# Patient Record
Sex: FEMALE | Race: WHITE | HISPANIC OR LATINO | Employment: PART TIME | URBAN - METROPOLITAN AREA
[De-identification: names, ages, dates, MRNs, and addresses within clinical notes are randomized per-mention and may not be internally consistent; named-entity substitution may affect disease eponyms.]

---

## 2019-07-11 ENCOUNTER — TRANSCRIBE ORDERS (OUTPATIENT)
Dept: ADMINISTRATIVE | Facility: HOSPITAL | Age: 34
End: 2019-07-11

## 2019-07-11 DIAGNOSIS — R10.9 STOMACH ACHE: ICD-10-CM

## 2019-07-11 DIAGNOSIS — K85.10 GALLSTONE PANCREATITIS: ICD-10-CM

## 2019-07-11 DIAGNOSIS — K85.80 FAMILIAL PANCREATITIS WITH INCREASED CANCER SYNDROME: ICD-10-CM

## 2019-07-11 DIAGNOSIS — K29.00 ACUTE GASTRITIS WITHOUT HEMORRHAGE, UNSPECIFIED GASTRITIS TYPE: Primary | ICD-10-CM

## 2019-07-11 DIAGNOSIS — Z15.09 FAMILIAL PANCREATITIS WITH INCREASED CANCER SYNDROME: ICD-10-CM

## 2019-07-16 ENCOUNTER — HOSPITAL ENCOUNTER (OUTPATIENT)
Dept: ULTRASOUND IMAGING | Facility: HOSPITAL | Age: 34
Discharge: HOME | End: 2019-07-16
Payer: COMMERCIAL

## 2019-07-16 DIAGNOSIS — R10.9 STOMACH ACHE: ICD-10-CM

## 2019-07-16 DIAGNOSIS — K85.10 GALLSTONE PANCREATITIS: ICD-10-CM

## 2019-07-16 DIAGNOSIS — K85.80 FAMILIAL PANCREATITIS WITH INCREASED CANCER SYNDROME: ICD-10-CM

## 2019-07-16 DIAGNOSIS — Z15.09 FAMILIAL PANCREATITIS WITH INCREASED CANCER SYNDROME: ICD-10-CM

## 2019-07-16 DIAGNOSIS — K29.00 ACUTE GASTRITIS WITHOUT HEMORRHAGE, UNSPECIFIED GASTRITIS TYPE: ICD-10-CM

## 2019-07-16 PROCEDURE — 76700 US EXAM ABDOM COMPLETE: CPT

## 2019-07-23 ENCOUNTER — OFFICE VISIT (OUTPATIENT)
Dept: SURGERY | Facility: CLINIC | Age: 34
End: 2019-07-23

## 2019-07-23 VITALS
BODY MASS INDEX: 26.93 KG/M2 | HEART RATE: 80 BPM | DIASTOLIC BLOOD PRESSURE: 84 MMHG | WEIGHT: 152 LBS | HEIGHT: 63 IN | SYSTOLIC BLOOD PRESSURE: 110 MMHG

## 2019-07-23 DIAGNOSIS — K80.10 CALCULOUS CHOLECYSTITIS: Primary | ICD-10-CM

## 2019-07-23 PROCEDURE — 99204 OFFICE O/P NEW MOD 45 MIN: CPT | Performed by: SPECIALIST

## 2019-07-23 RX ORDER — ACETAMINOPHEN 500 MG
500 TABLET ORAL EVERY 6 HOURS PRN
COMMUNITY
End: 2022-08-04

## 2019-07-23 RX ORDER — OMEPRAZOLE 40 MG/1
40 CAPSULE, DELAYED RELEASE ORAL DAILY
COMMUNITY
End: 2021-01-11

## 2019-07-23 NOTE — PROGRESS NOTES
Patient is a 61-year-old  female originally from [place] who speaks minimal English and who complains of a fairly long history of postprandial epigastric and right upper quadrant abdominal pain  Through an  she states that she almost constantly has right upper quadrant abdominal pain  She was seen by family physician who ordered an ultrasound of the gallbladder demonstrated gallstones  At that time she was referred to our office for evaluation possible laparoscopic cholecystectomy  Other than that she is quite healthy  She has had a  3 or 4 years ago but other than that she is in great health  Physical exam:  Young adult  female who is awake alert in no distress  Lungs clear  Cor regular rate and rhythm  Abdomen flat firm she has mild right upper quadrant tenderness noted  There is a well-healed Pfannenstiel incision in the suprapubic area with no evidence of hernia  Impression:  Symptomatic gallstones  Plan:  She will eventually need a laparoscopic cholecystectomy  She and her significant other are not ready to pursue this at this time and and that is fine  At this point she is discharged told return here when they are ready  She is told to go to the emergency room if she develops significant abdominal pain

## 2019-07-25 ENCOUNTER — ANESTHESIA EVENT (INPATIENT)
Dept: PERIOP | Facility: HOSPITAL | Age: 34
End: 2019-07-25
Payer: COMMERCIAL

## 2019-07-25 ENCOUNTER — APPOINTMENT (INPATIENT)
Dept: RADIOLOGY | Facility: HOSPITAL | Age: 34
End: 2019-07-25
Payer: COMMERCIAL

## 2019-07-25 ENCOUNTER — APPOINTMENT (EMERGENCY)
Dept: ULTRASOUND IMAGING | Facility: HOSPITAL | Age: 34
End: 2019-07-25
Payer: COMMERCIAL

## 2019-07-25 ENCOUNTER — HOSPITAL ENCOUNTER (OUTPATIENT)
Facility: HOSPITAL | Age: 34
Setting detail: OUTPATIENT SURGERY
LOS: 1 days | Discharge: HOME | End: 2019-07-25
Attending: EMERGENCY MEDICINE | Admitting: SPECIALIST
Payer: COMMERCIAL

## 2019-07-25 ENCOUNTER — ANESTHESIA (INPATIENT)
Dept: PERIOP | Facility: HOSPITAL | Age: 34
End: 2019-07-25
Payer: COMMERCIAL

## 2019-07-25 VITALS
TEMPERATURE: 98.8 F | HEART RATE: 65 BPM | HEIGHT: 60 IN | BODY MASS INDEX: 29.95 KG/M2 | OXYGEN SATURATION: 100 % | WEIGHT: 152.56 LBS | DIASTOLIC BLOOD PRESSURE: 67 MMHG | RESPIRATION RATE: 16 BRPM | SYSTOLIC BLOOD PRESSURE: 100 MMHG

## 2019-07-25 DIAGNOSIS — K80.00 ACUTE CALCULOUS CHOLECYSTITIS: Primary | ICD-10-CM

## 2019-07-25 DIAGNOSIS — R10.11 RIGHT UPPER QUADRANT PAIN: ICD-10-CM

## 2019-07-25 DIAGNOSIS — K80.20 CHOLELITHIASIS: ICD-10-CM

## 2019-07-25 LAB
ALBUMIN SERPL BCP-MCNC: 4.3 G/DL (ref 3–5.2)
ALP SERPL-CCNC: 55 U/L (ref 43–122)
ALT SERPL W P-5'-P-CCNC: 30 U/L (ref 9–52)
AMORPH PHOS CRY URNS QL MICRO: ABNORMAL /HPF
ANION GAP SERPL CALCULATED.3IONS-SCNC: 7 MMOL/L (ref 5–14)
AST SERPL W P-5'-P-CCNC: 19 U/L (ref 14–36)
ATRIAL RATE: 62 BPM
BACTERIA UR QL AUTO: ABNORMAL /HPF
BASOPHILS # BLD AUTO: 0.1 THOUSANDS/ΜL (ref 0–0.1)
BASOPHILS NFR BLD AUTO: 2 % (ref 0–1)
BILIRUB SERPL-MCNC: 0.4 MG/DL
BILIRUB UR QL STRIP: NEGATIVE
BUN SERPL-MCNC: 14 MG/DL (ref 5–25)
CALCIUM SERPL-MCNC: 9.4 MG/DL (ref 8.4–10.2)
CHLORIDE SERPL-SCNC: 103 MMOL/L (ref 97–108)
CLARITY UR: ABNORMAL
CO2 SERPL-SCNC: 30 MMOL/L (ref 22–30)
COLOR UR: ABNORMAL
CREAT SERPL-MCNC: 0.72 MG/DL (ref 0.6–1.2)
EOSINOPHIL # BLD AUTO: 0.3 THOUSAND/ΜL (ref 0–0.4)
EOSINOPHIL NFR BLD AUTO: 3 % (ref 0–6)
ERYTHROCYTE [DISTWIDTH] IN BLOOD BY AUTOMATED COUNT: 12.1 %
EXT PREG TEST URINE: NEGATIVE
EXT. CONTROL ED NAV: NORMAL
GFR SERPL CREATININE-BSD FRML MDRD: 111 ML/MIN/1.73SQ M
GLUCOSE SERPL-MCNC: 96 MG/DL (ref 70–99)
GLUCOSE UR STRIP-MCNC: NEGATIVE MG/DL
HCT VFR BLD AUTO: 38.7 % (ref 36–46)
HGB BLD-MCNC: 13.3 G/DL (ref 12–16)
HGB UR QL STRIP.AUTO: 250
KETONES UR STRIP-MCNC: NEGATIVE MG/DL
LEUKOCYTE ESTERASE UR QL STRIP: 25
LIPASE SERPL-CCNC: 130 U/L (ref 23–300)
LYMPHOCYTES # BLD AUTO: 3 THOUSANDS/ΜL (ref 0.5–4)
LYMPHOCYTES NFR BLD AUTO: 33 % (ref 25–45)
MCH RBC QN AUTO: 32.2 PG (ref 26–34)
MCHC RBC AUTO-ENTMCNC: 34.3 G/DL (ref 31–36)
MCV RBC AUTO: 94 FL (ref 80–100)
MONOCYTES # BLD AUTO: 0.4 THOUSAND/ΜL (ref 0.2–0.9)
MONOCYTES NFR BLD AUTO: 5 % (ref 1–10)
NEUTROPHILS # BLD AUTO: 5.2 THOUSANDS/ΜL (ref 1.8–7.8)
NEUTS SEG NFR BLD AUTO: 58 % (ref 45–65)
NITRITE UR QL STRIP: NEGATIVE
NON-SQ EPI CELLS URNS QL MICRO: ABNORMAL /HPF
P AXIS: 39 DEGREES
PH UR STRIP.AUTO: 8 [PH]
PLATELET # BLD AUTO: 283 THOUSANDS/UL (ref 150–450)
PMV BLD AUTO: 9 FL (ref 8.9–12.7)
POTASSIUM SERPL-SCNC: 4.2 MMOL/L (ref 3.6–5)
PR INTERVAL: 126 MS
PROT SERPL-MCNC: 7.3 G/DL (ref 5.9–8.4)
PROT UR STRIP-MCNC: NEGATIVE MG/DL
QRS AXIS: 57 DEGREES
QRSD INTERVAL: 84 MS
QT INTERVAL: 416 MS
QTC INTERVAL: 422 MS
RBC # BLD AUTO: 4.12 MILLION/UL (ref 4–5.2)
RBC #/AREA URNS AUTO: ABNORMAL /HPF
SODIUM SERPL-SCNC: 140 MMOL/L (ref 137–147)
SP GR UR STRIP.AUTO: 1.01 (ref 1–1.04)
T WAVE AXIS: 15 DEGREES
UROBILINOGEN UA: NEGATIVE MG/DL
VENTRICULAR RATE: 62 BPM
WBC # BLD AUTO: 9 THOUSAND/UL (ref 4.5–11)
WBC #/AREA URNS AUTO: ABNORMAL /HPF

## 2019-07-25 PROCEDURE — 99285 EMERGENCY DEPT VISIT HI MDM: CPT

## 2019-07-25 PROCEDURE — 93005 ELECTROCARDIOGRAM TRACING: CPT

## 2019-07-25 PROCEDURE — 96361 HYDRATE IV INFUSION ADD-ON: CPT

## 2019-07-25 PROCEDURE — 76705 ECHO EXAM OF ABDOMEN: CPT

## 2019-07-25 PROCEDURE — 99222 1ST HOSP IP/OBS MODERATE 55: CPT | Performed by: SPECIALIST

## 2019-07-25 PROCEDURE — 81025 URINE PREGNANCY TEST: CPT | Performed by: PHYSICIAN ASSISTANT

## 2019-07-25 PROCEDURE — 81001 URINALYSIS AUTO W/SCOPE: CPT | Performed by: PHYSICIAN ASSISTANT

## 2019-07-25 PROCEDURE — 99285 EMERGENCY DEPT VISIT HI MDM: CPT | Performed by: PHYSICIAN ASSISTANT

## 2019-07-25 PROCEDURE — 83690 ASSAY OF LIPASE: CPT | Performed by: PHYSICIAN ASSISTANT

## 2019-07-25 PROCEDURE — 80053 COMPREHEN METABOLIC PANEL: CPT | Performed by: PHYSICIAN ASSISTANT

## 2019-07-25 PROCEDURE — 85025 COMPLETE CBC W/AUTO DIFF WBC: CPT | Performed by: PHYSICIAN ASSISTANT

## 2019-07-25 PROCEDURE — 36415 COLL VENOUS BLD VENIPUNCTURE: CPT | Performed by: PHYSICIAN ASSISTANT

## 2019-07-25 PROCEDURE — 88304 TISSUE EXAM BY PATHOLOGIST: CPT | Performed by: PATHOLOGY

## 2019-07-25 PROCEDURE — 47562 LAPAROSCOPIC CHOLECYSTECTOMY: CPT | Performed by: SPECIALIST

## 2019-07-25 PROCEDURE — 96375 TX/PRO/DX INJ NEW DRUG ADDON: CPT

## 2019-07-25 PROCEDURE — 93010 ELECTROCARDIOGRAM REPORT: CPT | Performed by: INTERNAL MEDICINE

## 2019-07-25 PROCEDURE — 96374 THER/PROPH/DIAG INJ IV PUSH: CPT

## 2019-07-25 PROCEDURE — 81003 URINALYSIS AUTO W/O SCOPE: CPT | Performed by: PHYSICIAN ASSISTANT

## 2019-07-25 RX ORDER — OXYCODONE HYDROCHLORIDE AND ACETAMINOPHEN 5; 325 MG/1; MG/1
1 TABLET ORAL EVERY 4 HOURS PRN
Qty: 20 TABLET | Refills: 0 | Status: SHIPPED | OUTPATIENT
Start: 2019-07-25 | End: 2019-08-04

## 2019-07-25 RX ORDER — OXYCODONE HYDROCHLORIDE AND ACETAMINOPHEN 5; 325 MG/1; MG/1
1 TABLET ORAL EVERY 4 HOURS PRN
Status: DISCONTINUED | OUTPATIENT
Start: 2019-07-25 | End: 2019-07-25 | Stop reason: HOSPADM

## 2019-07-25 RX ORDER — ROCURONIUM BROMIDE 10 MG/ML
INJECTION, SOLUTION INTRAVENOUS AS NEEDED
Status: DISCONTINUED | OUTPATIENT
Start: 2019-07-25 | End: 2019-07-25 | Stop reason: SURG

## 2019-07-25 RX ORDER — KETOROLAC TROMETHAMINE 30 MG/ML
INJECTION, SOLUTION INTRAMUSCULAR; INTRAVENOUS AS NEEDED
Status: DISCONTINUED | OUTPATIENT
Start: 2019-07-25 | End: 2019-07-25 | Stop reason: SURG

## 2019-07-25 RX ORDER — SODIUM CHLORIDE, SODIUM LACTATE, POTASSIUM CHLORIDE, CALCIUM CHLORIDE 600; 310; 30; 20 MG/100ML; MG/100ML; MG/100ML; MG/100ML
125 INJECTION, SOLUTION INTRAVENOUS CONTINUOUS
Status: CANCELLED | OUTPATIENT
Start: 2019-07-25

## 2019-07-25 RX ORDER — METOCLOPRAMIDE HYDROCHLORIDE 5 MG/ML
INJECTION INTRAMUSCULAR; INTRAVENOUS AS NEEDED
Status: DISCONTINUED | OUTPATIENT
Start: 2019-07-25 | End: 2019-07-25 | Stop reason: SURG

## 2019-07-25 RX ORDER — ONDANSETRON 2 MG/ML
4 INJECTION INTRAMUSCULAR; INTRAVENOUS ONCE AS NEEDED
Status: DISCONTINUED | OUTPATIENT
Start: 2019-07-25 | End: 2019-07-25 | Stop reason: HOSPADM

## 2019-07-25 RX ORDER — ONDANSETRON 2 MG/ML
INJECTION INTRAMUSCULAR; INTRAVENOUS AS NEEDED
Status: DISCONTINUED | OUTPATIENT
Start: 2019-07-25 | End: 2019-07-25 | Stop reason: SURG

## 2019-07-25 RX ORDER — FENTANYL CITRATE 50 UG/ML
INJECTION, SOLUTION INTRAMUSCULAR; INTRAVENOUS AS NEEDED
Status: DISCONTINUED | OUTPATIENT
Start: 2019-07-25 | End: 2019-07-25 | Stop reason: SURG

## 2019-07-25 RX ORDER — FENTANYL CITRATE/PF 50 MCG/ML
25 SYRINGE (ML) INJECTION
Status: DISCONTINUED | OUTPATIENT
Start: 2019-07-25 | End: 2019-07-25 | Stop reason: HOSPADM

## 2019-07-25 RX ORDER — ONDANSETRON 2 MG/ML
4 INJECTION INTRAMUSCULAR; INTRAVENOUS ONCE
Status: COMPLETED | OUTPATIENT
Start: 2019-07-25 | End: 2019-07-25

## 2019-07-25 RX ORDER — PROPOFOL 10 MG/ML
INJECTION, EMULSION INTRAVENOUS AS NEEDED
Status: DISCONTINUED | OUTPATIENT
Start: 2019-07-25 | End: 2019-07-25 | Stop reason: SURG

## 2019-07-25 RX ORDER — NEOSTIGMINE METHYLSULFATE 1 MG/ML
INJECTION INTRAVENOUS AS NEEDED
Status: DISCONTINUED | OUTPATIENT
Start: 2019-07-25 | End: 2019-07-25 | Stop reason: SURG

## 2019-07-25 RX ORDER — KETOROLAC TROMETHAMINE 30 MG/ML
15 INJECTION, SOLUTION INTRAMUSCULAR; INTRAVENOUS ONCE
Status: COMPLETED | OUTPATIENT
Start: 2019-07-25 | End: 2019-07-25

## 2019-07-25 RX ORDER — SODIUM CHLORIDE 9 MG/ML
INJECTION, SOLUTION INTRAVENOUS AS NEEDED
Status: DISCONTINUED | OUTPATIENT
Start: 2019-07-25 | End: 2019-07-25 | Stop reason: HOSPADM

## 2019-07-25 RX ORDER — HEPARIN SODIUM 5000 [USP'U]/ML
INJECTION, SOLUTION INTRAVENOUS; SUBCUTANEOUS AS NEEDED
Status: DISCONTINUED | OUTPATIENT
Start: 2019-07-25 | End: 2019-07-25 | Stop reason: SURG

## 2019-07-25 RX ORDER — OXYCODONE HYDROCHLORIDE AND ACETAMINOPHEN 5; 325 MG/1; MG/1
2 TABLET ORAL EVERY 4 HOURS PRN
Status: DISCONTINUED | OUTPATIENT
Start: 2019-07-25 | End: 2019-07-25 | Stop reason: HOSPADM

## 2019-07-25 RX ORDER — ONDANSETRON 2 MG/ML
4 INJECTION INTRAMUSCULAR; INTRAVENOUS EVERY 8 HOURS PRN
Status: DISCONTINUED | OUTPATIENT
Start: 2019-07-25 | End: 2019-07-25 | Stop reason: HOSPADM

## 2019-07-25 RX ORDER — SODIUM CHLORIDE, SODIUM LACTATE, POTASSIUM CHLORIDE, CALCIUM CHLORIDE 600; 310; 30; 20 MG/100ML; MG/100ML; MG/100ML; MG/100ML
100 INJECTION, SOLUTION INTRAVENOUS CONTINUOUS
Status: DISCONTINUED | OUTPATIENT
Start: 2019-07-25 | End: 2019-07-25 | Stop reason: HOSPADM

## 2019-07-25 RX ORDER — LIDOCAINE HYDROCHLORIDE 10 MG/ML
INJECTION, SOLUTION INFILTRATION; PERINEURAL AS NEEDED
Status: DISCONTINUED | OUTPATIENT
Start: 2019-07-25 | End: 2019-07-25 | Stop reason: SURG

## 2019-07-25 RX ORDER — MIDAZOLAM HYDROCHLORIDE 1 MG/ML
INJECTION INTRAMUSCULAR; INTRAVENOUS AS NEEDED
Status: DISCONTINUED | OUTPATIENT
Start: 2019-07-25 | End: 2019-07-25 | Stop reason: SURG

## 2019-07-25 RX ORDER — BUPIVACAINE HYDROCHLORIDE 5 MG/ML
INJECTION, SOLUTION PERINEURAL AS NEEDED
Status: DISCONTINUED | OUTPATIENT
Start: 2019-07-25 | End: 2019-07-25 | Stop reason: HOSPADM

## 2019-07-25 RX ORDER — DEXAMETHASONE SODIUM PHOSPHATE 10 MG/ML
INJECTION, SOLUTION INTRAMUSCULAR; INTRAVENOUS AS NEEDED
Status: DISCONTINUED | OUTPATIENT
Start: 2019-07-25 | End: 2019-07-25 | Stop reason: SURG

## 2019-07-25 RX ORDER — GLYCOPYRROLATE 0.2 MG/ML
INJECTION INTRAMUSCULAR; INTRAVENOUS AS NEEDED
Status: DISCONTINUED | OUTPATIENT
Start: 2019-07-25 | End: 2019-07-25 | Stop reason: SURG

## 2019-07-25 RX ORDER — CEFAZOLIN SODIUM 2 G/50ML
2000 SOLUTION INTRAVENOUS EVERY 8 HOURS
Status: DISCONTINUED | OUTPATIENT
Start: 2019-07-25 | End: 2019-07-25 | Stop reason: HOSPADM

## 2019-07-25 RX ADMIN — GLYCOPYRROLATE 0.2 MG: 0.2 INJECTION, SOLUTION INTRAMUSCULAR; INTRAVENOUS at 17:24

## 2019-07-25 RX ADMIN — FENTANYL CITRATE 25 MCG: 50 INJECTION, SOLUTION INTRAMUSCULAR; INTRAVENOUS at 18:37

## 2019-07-25 RX ADMIN — LIDOCAINE HYDROCHLORIDE 50 MG: 10 INJECTION, SOLUTION INFILTRATION; PERINEURAL at 17:02

## 2019-07-25 RX ADMIN — KETOROLAC TROMETHAMINE 15 MG: 30 INJECTION, SOLUTION INTRAMUSCULAR; INTRAVENOUS at 10:27

## 2019-07-25 RX ADMIN — CEFAZOLIN SODIUM 2000 MG: 2 SOLUTION INTRAVENOUS at 13:35

## 2019-07-25 RX ADMIN — SODIUM CHLORIDE 1000 ML: 0.9 INJECTION, SOLUTION INTRAVENOUS at 10:26

## 2019-07-25 RX ADMIN — PROPOFOL 200 MG: 10 INJECTION, EMULSION INTRAVENOUS at 17:02

## 2019-07-25 RX ADMIN — GLYCOPYRROLATE 0.4 MG: 0.2 INJECTION, SOLUTION INTRAMUSCULAR; INTRAVENOUS at 18:04

## 2019-07-25 RX ADMIN — FENTANYL CITRATE 25 MCG: 50 INJECTION INTRAMUSCULAR; INTRAVENOUS at 17:57

## 2019-07-25 RX ADMIN — ROCURONIUM BROMIDE 40 MG: 10 INJECTION, SOLUTION INTRAVENOUS at 17:02

## 2019-07-25 RX ADMIN — ONDANSETRON 4 MG: 2 INJECTION, SOLUTION INTRAMUSCULAR; INTRAVENOUS at 10:26

## 2019-07-25 RX ADMIN — FENTANYL CITRATE 100 MCG: 50 INJECTION INTRAMUSCULAR; INTRAVENOUS at 17:02

## 2019-07-25 RX ADMIN — MIDAZOLAM HYDROCHLORIDE 2 MG: 1 INJECTION, SOLUTION INTRAMUSCULAR; INTRAVENOUS at 16:55

## 2019-07-25 RX ADMIN — DEXAMETHASONE SODIUM PHOSPHATE 4 MG: 10 INJECTION, SOLUTION INTRAMUSCULAR; INTRAVENOUS at 17:20

## 2019-07-25 RX ADMIN — SODIUM CHLORIDE, SODIUM LACTATE, POTASSIUM CHLORIDE, AND CALCIUM CHLORIDE 100 ML/HR: .6; .31; .03; .02 INJECTION, SOLUTION INTRAVENOUS at 13:30

## 2019-07-25 RX ADMIN — NEOSTIGMINE METHYLSULFATE 4 MG: 1 INJECTION INTRAVENOUS at 18:04

## 2019-07-25 RX ADMIN — OXYCODONE HYDROCHLORIDE AND ACETAMINOPHEN 1 TABLET: 5; 325 TABLET ORAL at 19:39

## 2019-07-25 RX ADMIN — FENTANYL CITRATE 25 MCG: 50 INJECTION, SOLUTION INTRAMUSCULAR; INTRAVENOUS at 18:30

## 2019-07-25 RX ADMIN — KETOROLAC TROMETHAMINE 30 MG: 30 INJECTION, SOLUTION INTRAMUSCULAR; INTRAVENOUS at 17:36

## 2019-07-25 RX ADMIN — METOCLOPRAMIDE 10 MG: 5 INJECTION, SOLUTION INTRAMUSCULAR; INTRAVENOUS at 17:40

## 2019-07-25 RX ADMIN — ONDANSETRON HYDROCHLORIDE 4 MG: 2 INJECTION, SOLUTION INTRAMUSCULAR; INTRAVENOUS at 17:29

## 2019-07-25 RX ADMIN — HEPARIN SODIUM 5000 UNITS: 5000 INJECTION, SOLUTION INTRAVENOUS; SUBCUTANEOUS at 17:08

## 2019-07-25 RX ADMIN — SODIUM CHLORIDE, SODIUM LACTATE, POTASSIUM CHLORIDE, AND CALCIUM CHLORIDE: .6; .31; .03; .02 INJECTION, SOLUTION INTRAVENOUS at 17:34

## 2019-07-25 NOTE — H&P
Chief Complaint:  Symptomatic gallstones      History of Present Illness:  Patient is a 43-year-old  female originally from [place] who was seen in the office a few days ago for a long history of postprandial epigastric and right upper quadrant abdominal pain  She speaks minimal English in through an  for the most part we communicated  She recently about a week underwent an ultrasound of the gallbladder that demonstrated gallstones  She was seen in the office and plans were made to perform laparoscopic cholecystectomy  Unfortunately developed increased pain and presented to the emergency room today  Past Medical History:   Past Medical History:   Diagnosis Date    Cholelithiasis          Past Surgical History:    Past Surgical History:   Procedure Laterality Date     SECTION               Allergies:  No Known Allergies      Medications:  No current facility-administered medications for this encounter  Social History:  Social History     Social History     Substance and Sexual Activity   Alcohol Use Not Currently     Social History     Substance and Sexual Activity   Drug Use Not on file     Social History     Tobacco Use   Smoking Status Never Smoker   Smokeless Tobacco Never Used         Family History:    Family History   Problem Relation Age of Onset    Alcohol abuse Mother     Arthritis Father          Review of Systems:    positive for as above postprandial epigastric right upper quadrant abdominal pain with nausea  No vomiting diarrhea constipation  All other review of systems were negative  Vitals:  Vitals:    19 1138   BP: 113/73   Pulse: 64   Resp: 16   Temp:    SpO2: 100%       Physical Exam:  The patient is a young adult  female who is awake alert mild distress  Vital signs are as above  Skin warm dry  Head normocephalic and atraumatic  Eyes CALDERON a m   Intact  Ears and nose she has a nose ring on the right knee areas but otherwise within normal limits   Throat gag reflex intact  Neck no masses thyromegaly or lymphadenopathy noted  Back no CVA or spinal tenderness  Lungs clear to a and P  Cor regular rate and rhythm no murmurs carotid bruits  Abdomen she has a visible Pfannenstiel incision in the suprapubic area; flat firm tender in right upper quadrant but no palpable masses or hernias are noted  Extremities negative CC E  Neurologically A&O x3 cranial nerves 2-12 intact  Lymph nodes are lymphadenopathy palpable  Lab Results: I have personally reviewed pertinent reports  See below  Imaging: I have personally reviewed pertinent reports  EKG, Pathology, and Other Studies: I have personally reviewed pertinent reports       Admission on 07/25/2019   Component Date Value    WBC 07/25/2019 9 00     RBC 07/25/2019 4 12     Hemoglobin 07/25/2019 13 3     Hematocrit 07/25/2019 38 7     MCV 07/25/2019 94     MCH 07/25/2019 32 2     MCHC 07/25/2019 34 3     RDW 07/25/2019 12 1     MPV 07/25/2019 9 0     Platelets 55/43/7364 283     Neutrophils Relative 07/25/2019 58     Lymphocytes Relative 07/25/2019 33     Monocytes Relative 07/25/2019 5     Eosinophils Relative 07/25/2019 3     Basophils Relative 07/25/2019 2*    Neutrophils Absolute 07/25/2019 5 20     Lymphocytes Absolute 07/25/2019 3 00     Monocytes Absolute 07/25/2019 0 40     Eosinophils Absolute 07/25/2019 0 30     Basophils Absolute 07/25/2019 0 10     Sodium 07/25/2019 140     Potassium 07/25/2019 4 2     Chloride 07/25/2019 103     CO2 07/25/2019 30     ANION GAP 07/25/2019 7     BUN 07/25/2019 14     Creatinine 07/25/2019 0 72     Glucose 07/25/2019 96     Calcium 07/25/2019 9 4     AST 07/25/2019 19     ALT 07/25/2019 30     Alkaline Phosphatase 07/25/2019 55     Total Protein 07/25/2019 7 3     Albumin 07/25/2019 4 3     Total Bilirubin 07/25/2019 0 40     eGFR 07/25/2019 111     Lipase 07/25/2019 130     Color, UA 07/25/2019 Straw     Clarity, UA 07/25/2019 Turbid*    Specific Gravity, UA 07/25/2019 1 015     pH, UA 07/25/2019 8 0     Leukocytes, UA 07/25/2019 25 0*    Nitrite, UA 07/25/2019 Negative     Protein, UA 07/25/2019 Negative     Glucose, UA 07/25/2019 Negative     Ketones, UA 07/25/2019 Negative     Bilirubin, UA 07/25/2019 Negative     Blood, UA 07/25/2019 250 0*    UROBILINOGEN UA 07/25/2019 Negative     EXT PREG TEST UR (Ref: N* 07/25/2019 negative     Control 07/25/2019 valid     RBC, UA 07/25/2019 20-30*    WBC, UA 07/25/2019 1-2*    Epithelial Cells 07/25/2019 Occasional     Bacteria, UA 07/25/2019 Field obscured, unable to enumerate*    AMORPH PHOSPATES 07/25/2019 Innumerable     Ventricular Rate 07/25/2019 62     Atrial Rate 07/25/2019 62     MD Interval 07/25/2019 126     QRSD Interval 07/25/2019 84     QT Interval 07/25/2019 416     QTC Interval 07/25/2019 422     P Axis 07/25/2019 39     QRS Axis 07/25/2019 57     T Wave Axis 07/25/2019 15          Impression:  Calculous cholecystitis  I have reviewed personally the ultrasound which shows cholelithiasis    Plan:  Laparoscopic cholecystectomy at this time

## 2019-07-25 NOTE — ANESTHESIA POSTPROCEDURE EVALUATION
Post-Op Assessment Note    CV Status:  Stable  Pain Score: 0    Pain management: adequate     Mental Status:  Alert and awake   Hydration Status:  Stable   PONV Controlled:  None   Airway Patency:  Patent   Post Op Vitals Reviewed: Yes      Staff: Anesthesiologist           /59 (07/25/19 1820)    Temp 98 5 °F (36 9 °C) (07/25/19 1820)    Pulse 67 (07/25/19 1820)   Resp 20 (07/25/19 1820)    SpO2 100 % (07/25/19 1820)

## 2019-07-25 NOTE — ANESTHESIA PREPROCEDURE EVALUATION
Review of Systems/Medical History  Patient summary reviewed  Chart reviewed      Cardiovascular  Negative cardio ROS    Pulmonary  Negative pulmonary ROS        GI/Hepatic      Comment: Nausea this am     Negative  ROS        Endo/Other  Negative endo/other ROS      GYN  Negative gynecology ROS     Comment: Pregnancy test negative     Hematology  Negative hematology ROS      Musculoskeletal  Negative musculoskeletal ROS        Neurology  Negative neurology ROS   Headaches,    Psychology   Negative psychology ROS              Physical Exam    Airway    Mallampati score: II  TM Distance: >3 FB  Neck ROM: full     Dental       Cardiovascular  Comment: Negative ROS, Cardiovascular exam normal    Pulmonary  Pulmonary exam normal     Other Findings        Anesthesia Plan  ASA Score- 2     Anesthesia Type- general with ASA Monitors  Additional Monitors:   Airway Plan: ETT  Plan Factors-    Induction- intravenous  Postoperative Plan-     Informed Consent- Anesthetic plan and risks discussed with patient  I personally reviewed this patient with the CRNA  Discussed and agreed on the Anesthesia Plan with the CRNA  Denita Carreon

## 2019-07-25 NOTE — ED PROVIDER NOTES
History  Chief Complaint   Patient presents with    Abdominal Pain     RUQ pain since midnight, she has a hx of gallstones and is a patient of Dr Tomy Valencia  No meds taken for pain today  42-year-old female with past medical history of chronic right upper quadrant pain and cholelithiasis presenting for evaluation of worsening right upper quadrant pain  Patient did recently follow up with Dr Yomi Valdes as an outpatient 2 days ago where they spoke about elective laparoscopic cholecystectomy  She was advised to return to the ED for any worsening pain  Patient states starting midnight last night she has had worsening right upper quadrant pain that is non radiating  Did not take anything for the pain prior to arrival   She reports nausea but no vomiting  No diarrhea dysuria back pain fevers chills or sweats  Pt has not eaten anything this morning, last meal >12 hours ago  Prior to Admission Medications   Prescriptions Last Dose Informant Patient Reported? Taking?   acetaminophen (TYLENOL) 500 mg tablet  Spouse/Significant Other Yes No   Sig: Take 500 mg by mouth every 6 (six) hours as needed for mild pain   omeprazole (PriLOSEC) 40 MG capsule  Spouse/Significant Other Yes No   Sig: Take 40 mg by mouth daily      Facility-Administered Medications: None       Past Medical History:   Diagnosis Date    Cholelithiasis        Past Surgical History:   Procedure Laterality Date     SECTION             Family History   Problem Relation Age of Onset    Alcohol abuse Mother     Arthritis Father      I have reviewed and agree with the history as documented  Social History     Tobacco Use    Smoking status: Never Smoker    Smokeless tobacco: Never Used   Substance Use Topics    Alcohol use: Not Currently    Drug use: Not on file        Review of Systems   All other systems reviewed and are negative  Physical Exam  Physical Exam   Constitutional: She is oriented to person, place, and time   She appears well-developed and well-nourished  No distress  HENT:   Head: Normocephalic and atraumatic  Eyes: Conjunctivae are normal    EOM grossly intact   Neck: Normal range of motion  Neck supple  No JVD present  Cardiovascular: Normal rate  Pulmonary/Chest: Effort normal    Abdominal: Soft  There is tenderness in the right upper quadrant and epigastric area  Tenderness reproducible to palpation RUQ and epigastric, no RLQ tenderness to palpation   Musculoskeletal:   FROM, steady gait, cap refill brisk, strength and sensation grossly intact throughout   Neurological: She is alert and oriented to person, place, and time  Skin: Skin is warm and dry  Capillary refill takes less than 2 seconds  Psychiatric: She has a normal mood and affect  Her behavior is normal    Nursing note and vitals reviewed        Vital Signs  ED Triage Vitals [07/25/19 0919]   Temperature Pulse Respirations Blood Pressure SpO2   (!) 96 6 °F (35 9 °C) 75 12 130/88 100 %      Temp Source Heart Rate Source Patient Position - Orthostatic VS BP Location FiO2 (%)   Tympanic Monitor Lying Left arm --      Pain Score       Worst Possible Pain           Vitals:    07/25/19 0919   BP: 130/88   Pulse: 75   Patient Position - Orthostatic VS: Lying         Visual Acuity      ED Medications  Medications   sodium chloride 0 9 % bolus 1,000 mL (1,000 mL Intravenous New Bag 7/25/19 1026)   ondansetron (ZOFRAN) injection 4 mg (4 mg Intravenous Given 7/25/19 1026)   ketorolac (TORADOL) injection 15 mg (15 mg Intravenous Given 7/25/19 1027)       Diagnostic Studies  Results Reviewed     Procedure Component Value Units Date/Time    CBC and differential [[id number]]  (Abnormal) Collected:  07/25/19 1015    Lab Status:  Final result Specimen:  Blood from Arm, Right Updated:  07/25/19 1035     WBC 9 00 Thousand/uL      RBC 4 12 Million/uL      Hemoglobin 13 3 g/dL      Hematocrit 38 7 %      MCV 94 fL      MCH 32 2 pg      MCHC 34 3 g/dL      RDW 12 1 % MPV 9 0 fL      Platelets 050 Thousands/uL      Neutrophils Relative 58 %      Lymphocytes Relative 33 %      Monocytes Relative 5 %      Eosinophils Relative 3 %      Basophils Relative 2 %      Neutrophils Absolute 5 20 Thousands/µL      Lymphocytes Absolute 3 00 Thousands/µL      Monocytes Absolute 0 40 Thousand/µL      Eosinophils Absolute 0 30 Thousand/µL      Basophils Absolute 0 10 Thousands/µL     Lipase [[id number]]  (Normal) Collected:  07/25/19 1015    Lab Status:  Final result Specimen:  Blood from Arm, Right Updated:  07/25/19 1034     Lipase 130 u/L     Comprehensive metabolic panel [[id number]]  (Normal) Collected:  07/25/19 1015    Lab Status:  Final result Specimen:  Blood from Arm, Right Updated:  07/25/19 1034     Sodium 140 mmol/L      Potassium 4 2 mmol/L      Chloride 103 mmol/L      CO2 30 mmol/L      ANION GAP 7 mmol/L      BUN 14 mg/dL      Creatinine 0 72 mg/dL      Glucose 96 mg/dL      Calcium 9 4 mg/dL      AST 19 U/L      ALT 30 U/L      Alkaline Phosphatase 55 U/L      Total Protein 7 3 g/dL      Albumin 4 3 g/dL      Total Bilirubin 0 40 mg/dL      eGFR 111 ml/min/1 73sq m     Narrative:       [place] guidelines for Chronic Kidney Disease (CKD):     Stage 1 with normal or high GFR (GFR > 90 mL/min/1 73 square meters)    Stage 2 Mild CKD (GFR = 60-89 mL/min/1 73 square meters)    Stage 3A Moderate CKD (GFR = 45-59 mL/min/1 73 square meters)    Stage 3B Moderate CKD (GFR = 30-44 mL/min/1 73 square meters)    Stage 4 Severe CKD (GFR = 15-29 mL/min/1 73 square meters)    Stage 5 End Stage CKD (GFR <15 mL/min/1 73 square meters)  Note: GFR calculation is accurate only with a steady state creatinine    Urine Microscopic [[id number]]  (Abnormal) Collected:  07/25/19 0943    Lab Status:  Final result Specimen:  Urine, Other Updated:  07/25/19 1022     RBC, UA 20-30 /hpf      WBC, UA 1-2 /hpf      Epithelial Cells Occasional /hpf      Bacteria, UA       Field obscured, unable to enumerate     /hpf     AMORPH PHOSPATES Innumerable /hpf     UA w Reflex to Microscopic w Reflex to Culture [[id number]]  (Abnormal) Collected:  07/25/19 0943    Lab Status:  Final result Specimen:  Urine, Other Updated:  07/25/19 1021     Color, UA Straw     Clarity, UA Turbid     Specific [place], UA 1 015     pH, UA 8 0     Leukocytes, UA 25 0     Nitrite, UA Negative     Protein, UA Negative mg/dl      Glucose, UA Negative mg/dl      Ketones, UA Negative mg/dl      Bilirubin, UA Negative     Blood,  0     UROBILINOGEN UA Negative mg/dL     POCT pregnancy, urine [[id number]]  (Normal) Resulted:  07/25/19 0946    Lab Status:  Final result Updated:  07/25/19 0946     EXT PREG TEST UR (Ref: Negative) negative     Control valid                 US right upper quadrant   Final Result by Jonah Acosta MD (07/25 1012)      The gallbladder appears contracted on today's exam and is very difficult to assess  No gross evidence of fluid in the gallbladder vicinity or obvious wall thickening  No ultrasound Jorge's sign reported by sonographer  Limited visualization of pancreas  Liver and right kidney are unremarkable        Given that the patient seems to have ongoing unexplained symptoms, consider further workup with abdominopelvic CT with intravenous and oral contrast       Workstation performed: [id number]                    Procedures  Procedures       ED Course  ED Course as of Jul 25 1103   Thu Jul 25, 2019   1023 Pt currently menstruating   Blood, UA(!): 250 0   1044 Spoke with dr gutierrez who is on call for the ED, since pt follows with mo, he would like me to call mo to discuss case      46 Spoke with Dr Elsie Diggs will be in to prep the pt for surgery for cholecystectomy today                                  MDM  Number of Diagnoses or Management Options  Diagnosis management comments: 31 yo F PMH of chronic RUQ pain and cholelithiasis, pt has had discussion with  mo regarding elective lap kelli, pt has had worsening pain since midnight, spoke with dr Lyric Kemp who will be in to prep pt for surgery and lap kelli    Portions of the record may have been created with voice recognition software  Occasional wrong word or "sound a like" substitutions may have occurred due to the inherent limitations of voice recognition software  Read the chart carefully and recognize, using context, where substitutions have occurred  Disposition  Final diagnoses:   Cholelithiasis   Right upper quadrant pain     Time reflects when diagnosis was documented in both MDM as applicable and the Disposition within this note     Time User Action Codes Description Comment    7/25/2019 11:01 AM Mark Valverde Add [K80 20] Cholelithiasis     7/25/2019 11:02 AM Nishi DEVINE Add [R10 11] Right upper quadrant pain       ED Disposition     ED Disposition Condition Date/Time Comment    Admit Stable Thu Jul 25, 2019 11:01 AM Case was discussed with Dr Lyric Kemp and the patient's admission status was agreed to be Admission Status: inpatient status to the service of Dr Cheryl Hernandez   Follow-up Information    None         Patient's Medications   Discharge Prescriptions    No medications on file     No discharge procedures on file      ED Provider  Electronically Signed by           Alan Martinez PA-C  07/25/19 9944

## 2019-07-25 NOTE — OP NOTE
OPERATIVE REPORT  PATIENT NAME: Jamar Truong    :  1985  MRN: [id number]  Pt Location:  OR ROOM 11    SURGERY DATE: 2019    Surgeon(s) and Role:     * Autumn Mijares MD - Primary    Preop Diagnosis:  Acute calculous cholecystitis [K80 00]    Post-Op Diagnosis Codes:     * Acute calculous cholecystitis [K80 00]    Procedure(s) (LRB):  CHOLECYSTECTOMY LAPAROSCOPIC (N/A)    Specimen(s):  ID Type Source Tests Collected by Time Destination   1 : Luis Gunsteph with stones  Tissue Gallbladder TISSUE EXAM Autumn Mijares MD 2019  5:40 PM        Estimated Blood Loss:   Minimal    Drains:  * No LDAs found *    Anesthesia Type:   General    Operative Indications:  Acute calculous cholecystitis [K80 00]      Operative Findings:  Chronic calculous cholecystitis with an impacted stone    Complications:   None    Procedure and Technique:  Patient brought to the operating room placed on the table supine position  Under adequate general endotracheal anesthesia the abdomen was prepped and draped in usual sterile fashion  11  Scalpel was used to make an incision in the umbilicus and Veress needle was inserted  The abdomen was insufflated with CO2 to 15 mm of mercury  Needle was move the 10 mm port is inserted  10 mm scope was inserted the abdomen was visually explored  There was noted be no trauma from needle port placement  Additional ports were placed in the subxiphoid right subcostal right upper quadrant area  These were 5 mm ports placed under direct vision  Patient is placed in reverse Trendelenburg and rotated to the left  The gallbladder was identified and noted to be quite fibrotic and shrunken indicative of chronic cholecystitis  Fundus was grasped retracted right subphrenic area  Anterior adhesions from the omentum to the gallbladder were noted quite fibrous and dense  These were removed bluntly with some difficulty    Eventually after freeing these up the infundibulum was grasped retracted laterally  The distal limb of the gallbladder was then dissected once again the entire organ was fibrotic and scarred down  The artery was immediately identified and was circumferentially dissected doubly clipped proximally and single clipped distally and then divided  The distal in the gallbladder was dissected circumferentially but there was noted be a stone impacted in the cystic duct distal to where the dissection had begun  At that point the stone was milked up proximal the gallbladder and then the cystic duct was then dissected circumferentially  This doubly clipped proximally singly clipped distally and then divided  The gallbladder was taken down from liver bed using the hook cautery  Once again it was hard fibrous consistent with chronic calculous cholecystitis  Was totally removed from the liver bed and brought out through the umbilical port site without difficulty  The areas inspected for bleeding no bleeding was noted  The areas copiously irrigated with saline solution this was suction from the field  The clips were inspected and noted be intact and functional   At that point and residual fluid was removed the CO2 and ports removed  The fascial defect at the umbilicus closed with an 0 Vicryl suture  All ports were infiltrated with 0 5% Marcaine  All skin is closed with 4 Monocryl in a running subcuticular fashion  Benzoin Steri-Strips were applied  The estimated blood loss minimal patient tolerated the procedure well she was delivered to the recovery room in stable condition    Neck is   I was present for the entire procedure    Patient Disposition:  PACU     SIGNATURE: Elsie Briggs MD  DATE: July 25, 2019  TIME: 6:14 PM

## 2019-07-25 NOTE — DISCHARGE INSTRUCTIONS
Colecistitis   CUIDADO AMBULATORIO:   Colecistitis  es lexus inflamación en pagan vesícula biliar  Pagan vesícula biliar almacena la bilis, la cual ayuda a descomponer la grasa que usted consume  Pagan vesícula biliar también ayuda a eliminar ciertos químicos de pagan cuerpo  Usted puede tener un ataque severo repentino (colecistitis [place]) o varios ataques leves (colecistitis crónica)  Los signos y síntomas más comunes incluyen los siguientes:   · Náuseas o vómito    · Dolor en pagan abdomen, frecuentemente después de snow consumido lexus comida pérez con alimentos grasoso    · [place] o escalofríos    · Protuberancia en el lado derecho de pagan abdomen    · Disminución del apetito     · Pagan piel y la parte fabricio de irena ojos se ponen amarillentos  Llame al 911 en deann de presentar lo siguiente:   · Usted tiene dolor torácico y dificultad para respirar  Busque atención médica de inmediato o llame al 911 si:   · Usted tiene dolor abdominal intenso  · Usted orina menos que de [facility]  Pregúntele a pagan [place] vitaminas y minerales son adecuados para usted  · Usted tiene fiebre o escalofríos  · Siente dolor al [place]  · Pagan piel o irena ojos se tornan amarillentos  · Usted tiene preguntas o inquietudes acerca de pagan condición o cuidado  El tratamiento de la colescisititis  podría incluir cualquiera de los siguientes:  · Medicamentos:      ¨ Antibióticos  tratan las infecciones bacteriales  ¨ Un medicamento con receta para el dolor  podrían ser [place]  Pregunte al médico cómo debe eli irvin medicamento de forma shah  Algunos medicamentos recetados para el dolor contienen acetaminofén  No tome otros medicamentos que contengan acetaminofén sin consultarlo con pagan médico  Demasiado acetaminofeno puede causar daño al hígado  Los medicamentos recetados para el dolor podrían causar estreñimiento  Pregunte a pagan médico guy prevenir o tratar estreñimiento       ¨ AINEs (Analgésicos antiinflamatorios no esteroides) guy el ibuprofeno, ayudan a disminuir la inflamación, el dolor y la [place]  Randa medicamento esta disponible con o sin lexus receta médica  Los AINEs pueden causar sangrado estomacal o problemas renales en ciertas personas  Si usted parminder un medicamento anticoagulante, siempre pregúntele a davis médico si los GLORIA son seguros para usted  Siempre liam la etiqueta de randa medicamento y [place] instrucciones  · La colecistostomía  es un procedimiento para drenar la bilis de davis vesícula biliar mediante lexus aguja hueca a través de davis abdomen  Puede que también se le introduzca un tubo en davis vesícula biliar para vaciarla en el curso de varios días o semanas  · La colecistectomía  es lexus cirugía para extraer davis vesícula biliar  Consuma alimentos saludables y variados:  Es posible que esto ayude a disminuir irena síntomas  Los alimentos saludables incluyen fruta, vegetales, panes integrales, productos lácteos bajo en grasa, frijoles, sherri sin grasa, y pescado  Pregunte si necesita seguir lexus dieta especial   [place] a irena consultas de control con davis médico según le indicaron  Anote irena preguntas para que se acuerde de hacerlas odell irena visitas  © 2017 [employer]  Information is for End User's use only and may not be sold, redistributed or otherwise used for commercial purposes  All illustrations and images included in CareNotes® are the copyrighted property of A D A M , Inc  or Akbar Rubin  Esta información es sólo para uso en educación  Davis intención no es darle un consejo médico sobre enfermedades o tratamientos  Colsulte con davis [place] farmacéutico antes de seguir cualquier régimen médico para saber si es seguro y efectivo para usted  Colecistectomía laparoscópica   LO QUE NECESITA SABER:   La colecistectomía laparoscópica es lexus cirugía para remover davis vesícula biliar     INSTRUCCIONES SOBRE EL [facility]ITAL[facility]:   Medicamentos:  Es posible que usted necesite alguno de los siguientes:  · Un medicamento con receta para el dolor  ayudan a calmar el dolor  No espere hasta que el dolor sea severo antes de eli randa medicamento  · AINEs (Analgésicos antiinflamatorios no esteroides)  disminuyen la inflamación y el dolor  Randa medicamento se puede comprar con o sin receta médica  Randa medicamento puede causar sangrado estomacal o problemas en los riñones en ciertas personas  Si usted parminder un medicamento anticoagulante, siempre pregúntele a pagan médico si los GLORIA son seguros para usted  Linda las etiquetas de los medicamentos y siga las indicaciones antes de usarlos  · [place] irena medicamentos guy se le haya indicado  Consulte con pagan médico si usted haim que pagan medicamento no le está ayudando o si presenta efectos secundarios  Infórmele si es alérgico a cualquier medicamento  Mantenga lexus lista actualizada de los [place], las vitaminas y los productos herbales que parminder  Incluya los siguientes datos de los medicamentos: cantidad, frecuencia y motivo de administración  Traiga con usted la lista o los envases de la píldoras a irena citas de seguimiento  Lleve la lista de los medicamentos con usted en deann de lexus emergencia  Programe lexus sara con pagan médico o gastroenterólogo 2 semanas después de la cirugía, o según le indiquen:  Anote irena preguntas para que se acuerde de hacerlas odell irena visitas  Cuidado de la herida:  Cuide de irena heridas quirúrgicas según indicaciones  [address] y secas  Usted puede bañarse el día después de pagan [place]  Qué comer después de la cirugía:  Consuma alimentos bajos en grasas por 4 a 6 semanas mientras pagan cuerpo aprende a digerir las grasas sin usar pagan vesícula  Aumente poco a poco la cantidad de grasas que consume  [place] suficiente líquidos  Pregunte cuánto líquido eli y cuáles líquidos son mejores para usted    Cuando regresar al [place] y otras actividades:  Usted puede regresar a [place] u [employer] [facility] actividades tan pronto guy davis dolor esté bajo control y usted se sienta cómodo  Para muchas personas, esto es de 5 a 7 días después de la [place]  Pregúntele a davis [place] vitaminas y minerales son adecuados para usted  · Usted tiene fiebre de más de 101°F (38°C) o tiene escalofríos  · Usted tiene dolor o náuseas que no se alivian con medicamento  · Usted tiene enrojecimiento e inflamación alrededor de haven incisiones, o de haven incisiones salen marly o pus  · Usted está estreñido o tiene diarrea  · Davis piel u ojos están amarillos, o haven heces están de color pálido  · Usted tiene preguntas acerca de davis cirugía, condición o cuidado  Busque atención médica de inmediato o llame al 911 si:   · Usted no puede dejar de vomitar  · Haven heces son negras o tienen marly  · Usted tiene dolor en davis abdomen y irvin se encuentra inflamado o patel  · Davis brazo o pierna se siente caliente, sensible y [place]  Se podría milo inflamado y mobley  · Usted se siente mareada, le hace falta el aire y tiene dolor de [place]  · Usted expectora marly  © 2017 [employer] [place] Information is for End User's use only and may not be sold, redistributed or otherwise used for commercial purposes  All illustrations and images included in CareNotes® are the copyrighted property of A D A M , Inc  or Akbar Rubin  Esta información es sólo para uso en educación  Davis intención no es darle un consejo médico sobre enfermedades o tratamientos  Colsulte con davis [place] farmacéutico antes de seguir cualquier régimen médico para saber si es seguro y efectivo para usted

## 2019-07-27 ENCOUNTER — TELEPHONE (OUTPATIENT)
Dept: OTHER | Facility: OTHER | Age: 34
End: 2019-07-27

## 2019-07-27 NOTE — TELEPHONE ENCOUNTER
Aurelia Camara 1985  CONFIDENTIALTY NOTICE: This fax transmission is intended only for the addressee  It contains information that is legally privileged,  confidential or otherwise protected from use or disclosure  If you are not the intended recipient, you are strictly prohibited from reviewing,  disclosing, copying using or disseminating any of this information or taking any action in reliance on or regarding this information  If you have  received this fax in error, please notify us immediately by telephone so that we can arrange for its return to us  Page:   Call Id: [id number]  Health Call  Standard Call Report  Health Call  Patient Name: Chase Holland  Gender: Female  : 1985  Age: 30 Y 2 M 25 D  Return Phone  Number: [phone] (Home)  Address: [place] [address]  City/State/Zip: [address] [place] [address]  Practice Name: [facility]y [facility]  Practice Charged:  Physician:  [address] [place] [address] Name:  Relationship To  Patient: Self  Return Phone Number: [phone] (Home)  Presenting Problem: "I am having gas pain since my  surgery on Thursday "  Service Type: Triage  Charged Service 1: Carie Herbert U  40  Name and  Number:  Nurse Assessment  Nurse: Gigi Izquierdo Date/Time: 2019 10:48:32 AM  Type of assessment required:  ---General (Adult or Child)  Duration of Current S/S  ---Since yesterday  Location/Radiation  ---Abdomen  Temperature (F) and route:  ---Denies fever  Symptom Specific Meds (Dose/Time):  ---None  Other S/S  ---States she feels some discomfort in her stomach  Feels like gas pain and abdomen is  a swollen  Has laparoscopic cholecystectomy surgery on 2019  Incision areas are  intact- with no swelling, redness, or discharge  Has not attempted moving in different  positions to help move gas bubbles    Pain Scale on scale of 1-10, 10 being the worst:  ---3/10  Symptom progression:  ---same  Intake and Output  Aurelia Camara 1985  CONFIDENTIALTY NOTICE: This fax transmission is intended only for the addressee  It contains information that is legally privileged,  confidential or otherwise protected from use or disclosure  If you are not the intended recipient, you are strictly prohibited from reviewing,  disclosing, copying using or disseminating any of this information or taking any action in reliance on or regarding this information  If you have  received this fax in error, please notify us immediately by telephone so that we can arrange for its return to us  Page: 2 of 2  Call Id: [id number]  Nurse Assessment  ---Drinking normally / WNL  LMP/ Pregnancy:  ---n/a  Breastfeeding  ---No  Last Exam/Treatment:  ---7/25/2019 laparoscopic cholecystectomy  Protocols  Protocol Title Nurse Date/Time  Post-Op Symptoms and Questions Kenneth Leonardo 7/27/2019 11:05:58 AM  Question Caller Affirmed  Disp  Time Disposition Final User  7/27/2019 11:08:01 [address], RN, Pedrito Castro  7/27/2019 11:09:52 AM RN Triaged Yes Shawna Henriquez RN, [facility] Advice Given Per Protocol  HOME CARE: You should be able to treat this at home  GIVE ANSWER: Answer the caller's question from information in  BACKGROUND INFORMATION, personal knowledge base or pertinent reference  Document your response  If uncertain of answer,  tell caller to call PCP during office hours  PAIN MEDICINE: * Your surgeon has probably recommended or prescribed a pain medicine  Follow your surgeon's instructions  Take the pain medicine as directed  * If you are taking NARCOTIC MEDICINES (e g , Tylenol #3,  Vicodin), do not drink alcohol, drive or operate dangerous machinery  Remember that narcotic pain medicine can cause constipation  All  persons taking narcotic pain pills, especially the elderly, should also be taking a laxative like docusate (Colace)  * For relief of mild to  moderate pain, you may take ACETAMINOPHEN every 6 hours (Adults 650-1000 mg) OR IBUPROFEN every 6-8 hours (Adults 400  mg)   LIQUIDS: Adequate liquid intake is important  Drink 8 glasses of water or other liquid daily  DO NOT SMOKE: Smoking slows  wound healing  You should not smoke during the 4 weeks after surgery  EXPECTED COURSE: * Pain - Pain and swelling normally  peak on day 2 after surgery  Pain should gradually get better during the next 1-2 weeks  Mild itching along the incision is usually a sign  of healing  * Dizziness - Mild dizziness, drowsiness, and nausea are fairly common during the 12-24 hours after a surgical procedure  involving general anesthesia or IV medications  CALL BACK IF: * Severe pain * Fever over 100 5 F (38 1 C) * You become worse  CARE ADVICE per Post-Op Symptoms and Questions (Adult) guideline  Caller Understands: Yes  Caller Disagree/Comply: Comply  PreDisposition: Unsure  Comments  User: Papito Kerns RN Date/Time: 7/27/2019 11:09:45 AM  Did advice for patient to move around in different comfortable positions to help move gas bubbles

## 2019-10-01 NOTE — UTILIZATION REVIEW
URGENT/EMERGENT  INPATIENT/SPU AUTHORIZATION REQUEST    Date: 10/01/19            # Pages in this Request:     x New Request   Additional Information for PA#:     Office Contact Name:  Vani Zuniga Title: Utilization Review, Registed Nurse     Phone: [phone]  Ext  Availability (Date/Time): Wednesday - Friday 8 am- 4 pm     Inpatient Review x SPU Review        Current       x Late Pick-up   · How your facility was first notified of the Late Pick-up: Paths Letter   · When your facility was first notified of the Late Pick-up (date):9/27/2019         RECIPIENT INFORMATION    Recipient ID#: [id number]   Recipient Name: Ivonne Tony         YOB: 1985  30 y o  Recipient Alias:     Gender:   Male x Female Medicaid Eligibility ([address] [place] [address]): INSURANCE INFORMATION    (All other private or governmental health insurance benefits must be utilized prior to billing the [place] Program)    Was this admission the result of an MVA, other accident, assault, injury, fall, gunshot, bite etc ? Yes x No                   If yes, provide a brief description of the incident  Does the recipient have other insurance coverage? Yes x No        Insurance Company Name/Policy #      Did that insurance pay on this claim? Yes  No        Did that insurance deny this claim? Yes  No    If yes, reason for denial:      Does the recipient have Medicare? Yes x No        Did Medicare exhaust prior to this admission? Yes  No        Did Medicare partially pay this claim? Yes  No        Did that insurance deny this claim? Yes  No    If yes, reason for denial:          Was the recipient a prisoner at the time of admission?   Yes x No            PROVIDER INFORMATION    [facility]: [facility] Provider ID#: [id number]    [address] [place] [address] Physician Name:  Macho Spicer Provider ID#: [id number]        ADMISSION INFORMATION    Type of Admission: (please choose one)     ED Direct    If yes, from where? Transfer    If yes, transferring hospital (inpatient, rehab, or psych) Provider Name/Provider ID#: Admission Floor or Unit Type: Med Surg     Dates/Times:        ED Date/Time: 7/25/2019  9:14 AM        Observation Date/Time:         Admission Date/Time: 7/25/19 1103        Discharge or Transfer Date/Time: 7/25/2019  8:12 PM        DIAGNOSIS/PROCEDURE CODES    Primary Diagnosis Code/Primary Diagnosis Code description:  K80 10 Calculus of gallbladder with chronic cholecystitis without obstruction  Additional Diagnosis Code(s) and Description(s)-(up to three additional codes):    Procedure Code (one) and description:87828- Laparoscopic Cholecystectomy        CLINICAL INFORMATION - PRIOR ADMISSION ONLY    Is there a prior admission with a discharge date within 30 days of the date of this admission?    x No (Proceed to the next section - "Clinical Information - General Review Checklist:)      Yes (Provide the following information)     Prior admission dates:    MA Prior Authorization Number:        Review Outcome:     Diagnosis Code(s)/Description:    Procedure Code/Description:    Findings:    Treatment:    Condition on Discharge:   Vitals:    Labs:   Imaging:   Medications: Follow-up Instructions:    Disposition:        CLINICAL INFORMATION - GENERAL REVIEW CHECKLIST    EMERGENCY DEPARTMENT: (Proceed to "ADMISSION" if Direct Admission)    Presenting Signs/Symptoms:   61-year-old female with past medical history of chronic right upper quadrant pain and cholelithiasis presenting for evaluation of worsening right upper quadrant pain  Patient did recently follow up with Dr August Etienne as an outpatient 2 days ago  She was advised to return to the ED for any worsening pain  Patient states starting midnight last night she has had worsening right upper quadrant pain that is non radiating  Did not take anything for the pain prior to arrival   She reports nausea but no vomiting    No diarrhea dysuria back pain fevers chills or sweats  Pt has not eaten anything this morning, last meal >12 hours ago       Medication/treatment prior to arrival in the ED:    Past Medical History:   Past Medical History:   Diagnosis Date    Cholelithiasis        Clinical Exam:Tenderness reproducible to palpation RUQ and epigastric, no RLQ tenderness to palpation     Initial Vital Signs: (Temp, Pulse, Resp, and BP)   ED Triage Vitals   Temperature Pulse Respirations Blood Pressure SpO2   07/25/19 0919 07/25/19 0919 07/25/19 0919 07/25/19 0919 07/25/19 0919   (!) 96 6 °F (35 9 °C) 75 12 130/88 100 %      Temp Source Heart Rate Source Patient Position - Orthostatic VS BP Location FiO2 (%)   07/25/19 0919 07/25/19 0919 07/25/19 0919 07/25/19 0919 07/25/19 1820   Tympanic Monitor Lying Left arm 98      Pain Score       07/25/19 0919       Worst Possible Pain           Pertinent Repeat Vital Signs: (include times they were obtained)  Date/Time  Temp  Pulse  Resp  BP  SpO2  O2 Device  Cardiac (WDL)  Patient Position - Orthostatic VS   07/25/19 1907  98 8 °F (37 1 °C)  65  16  100/67  100 %  None (Room air)  --  Lying   07/25/19 1856  --  62  14  117/62  100 %  --  --  --   07/25/19 1850  --  59  14  108/68  98 %  --  --  --   07/25/19 1835  --  52Abnormal   18  104/63  100 %  None (Room air)  --  --   07/25/19 1820  98 5 °F (36 9 °C)  67  20  102/59  100 %  Face tent  WDL  --   07/25/19 1500  97 5 °F (36 4 °C)  67  16  104/75  100 %  None (Room air)  --  Lying   07/25/19 1257  97 1 °F (36 2 °C)Abnormal   64  16  126/73  100 %  None (Room air)  --  Lying   07/25/19 1138  --  64  16  113/73  100 %  None (Room air)  --  Lying       Pertinent Sustained Findings: (include times they were obtained)    Weight in Kilograms:  Wt Readings from Last 1 Encounters:   07/25/19 69 2 kg (152 lb 8 9 oz)       Pertinent Labs (results):  CBC and differential [[id number]] (Abnormal) Collected: 07/25/19 1015   Order Status: Completed Lab Status: Final result Updated: 07/25/19 1035   Specimen: Blood from Arm, Right     WBC 9 00 4 50 - 11 00 Thousand/uL     RBC 4 12 4 00 - 5 20 Million/uL     Hemoglobin 13 3 12 0 - 16 0 g/dL     Hematocrit 38 7 36 0 - 46 0 %     MCV 94 80 - 100 fL     MCH 32 2 26 0 - 34 0 pg     MCHC 34 3 31 0 - 36 0 g/dL     RDW 12 1 <15 3 %     MPV 9 0 8 9 - 12 7 fL     Platelets 586 167 - 099 Thousands/uL     Neutrophils Relative 58 45 - 65 %     Lymphocytes Relative 33 25 - 45 %     Monocytes Relative 5 1 - 10 %     Eosinophils Relative 3 0 - 6 %     Basophils Relative 2High  0 - 1 %     Neutrophils Absolute 5 20 1 80 - 7 80 Thousands/µL     Lymphocytes Absolute 3 00 0 50 - 4 00 Thousands/µL     Monocytes Absolute 0 40 0 20 - 0 90 Thousand/µL     Eosinophils Absolute 0 30 0 00 - 0 40 Thousand/µL     Basophils Absolute 0 10 0 00 - 0 10 Thousands/µL    Lipase [[id number]] (Normal) Collected: 07/25/19 1015   Order Status: Completed Lab Status: Final result Updated: 07/25/19 1034   Specimen: Blood from Arm, Right     Lipase 130 23 - 300 u/L    Comprehensive metabolic panel [[id number]] (Normal) Collected: 07/25/19 1015   Order Status: Completed Lab Status: Final result Updated: 07/25/19 1034   Specimen: Blood from Arm, Right     Sodium 140 137 - 147 mmol/L     Potassium 4 2 3 6 - 5 0 mmol/L     Chloride 103 97 - 108 mmol/L     CO2 30 22 - 30 mmol/L     ANION GAP 7 5 - 14 mmol/L     BUN 14 5 - 25 mg/dL     Creatinine 0 72 0 60 - 1 20 mg/dL         Glucose 96 70 - 99 mg/dL         Calcium 9 4 8 4 - 10 2 mg/dL     AST 19 14 - 36 U/L         ALT 30 9 - 52 U/L         Alkaline Phosphatase 55 43 - 122 U/L     Total Protein 7 3 5 9 - 8 4 g/dL     Albumin 4 3 3 0 - 5 2 g/dL     Total Bilirubin 0 40 <1 30 mg/dL     eGFR 111 >60 ml/min/1 73sq m    UA w Reflex to Microscopic w Reflex to Culture [[id number]] (Abnormal) Collected: 07/25/19 0943   Order Status: Completed Lab Status: Final result Updated: 07/25/19 1021   Specimen: Urine, Other     Color, UA Straw Straw, Yellow, Pale Yellow     Clarity, UA TurbidAbnormal  Clear, Other     Specific Gravity, UA 1 015 1 003 - 1 040     pH, UA 8 0 4 5, 5 0, 5 5, 6 0, 6 5, 7 0, 7 5, 8 0      Leukocytes, UA 25  0Abnormal  Negative      Nitrite, UA Negative Negative     Protein, UA Negative Negative mg/dl     Glucose, UA Negative Negative mg/dl     Ketones, UA Negative Negative mg/dl     Bilirubin, UA Negative Negative     Blood,  0Abnormal  Negative     UROBILINOGEN UA Negative 1 0, Negative mg/dL          Radiology (results): [facility] RUQ - 7/25 - The gallbladder appears contracted on today's exam and is very difficult to assess  No gross evidence of fluid in the gallbladder vicinity or obvious wall thickening  No ultrasound Jorge's sign reported by sonographer    Limited visualization of pancreas    Liver and right kidney are unremarkable  EKG (results):  7/25 - NSR  With sinus arrthymia    Other tests (results):    Tests pending final results:    Treatment in the ED:   Medication Administration from 07/25/2019 0857 to 07/25/2019 1236       Date/Time Order Dose Route Action     07/25/2019 1026 sodium chloride 0 9 % bolus 1,000 mL 1,000 mL Intravenous New Bag     07/25/2019 1026 ondansetron (ZOFRAN) injection 4 mg 4 mg Intravenous Given     07/25/2019 1027 ketorolac (TORADOL) injection 15 mg 15 mg Intravenous Given           Other treatments: OP Report - 7/25/2019 - 5:20 pm   Procedure - Laparoscopic Cholestectomy  Anesthesia- general   Operative Findings:  Chronic calculous cholecystitis with an impacted stone  Patient tolerated      Change in condition while in the ED:     Response to ED Treatment:          OBSERVATION: (Proceed to "ADMISSION" if Direct Admission)    Orders written during the observation period  Meds Name, dose, route, time, how may doses given:  PRN Meds Name, dose, route, time, how many doses given within the first 24 hrs :  IVs Type, rate, and total amt   ordered/given:  Labs, imaging, other:  Consults and findings:    Test Results during the observation period  Pertinent Lab tests (dates/results):  Culture results (blood, urine, spinal, wound, respiratory, etc ):  Imaging tests (dates/results):  EKG (dates/results): Other test (dates/results):  Tests pending (dates/results):    Surgical or Invasive Procedures during the observation period  Name of surgery/procedure:  Date & Time:  Patient Response:  Post-operative orders:  Operative Report/Findings:    Response to Treatment, Major Change in Condition, Major Charge in Treatment during the observation period          ADMISSION:    DIRECT Admissions Only:    · Presenting Signs/Symptoms:   ·   · Medication/treatment prior to arrival:  ·   · Past Medical History:  ·   · Clinical Exam on admission:  ·   · Vital Signs on admission: (Temp, Pulse, Resp, and BP)  ·   · Weight in kilograms:     ALL Admissions:    Admission Orders and Other Orders written within the first 24 hrs after admission  Meds Name, dose, route, time, how may doses given:  PRN Meds Name, dose, route, time, how many doses given within the first 24 hrs :  IVs Type, rate, and total amt  ordered/given:  Labs, imaging, other:      Consults and findings:    Test Results after admission  Pertinent Lab tests (dates/results):  Culture results (blood, urine, spinal, wound, respiratory, etc ):  Imaging tests (dates/results):  EKG (dates/results):   Other test (dates/results):  Tests pending (dates/results):    Surgical or Invasive Procedures  Name of surgery/procedure:  Date & Time:  Patient Response:  Post-operative orders:  Operative Report/Findings:    Response to Treatment, Major Change in Condition, Major Charge in Treatment anytime during admission    Disposition on Discharge  Home, Rehab, SNF, LTC, Shelter, etc : Home/Self Care    Cease to Breathe (CTB)  If a patient expires during an admission, in addition to the above information, please include:    Summary/timeline of the patient's decline in condition:    Medications and treatment:    Patient response to treatment:    Date and time patient ceased to breathe:        Is there a Readmission that follows this admission? Yes x No    If yes, provide dates:          InterQual Review    InterQual Criteria Met:  Yes  No x N/A        Please include the InterQual Review, InterQual year/version used, and the criteria selected:         PLEASE SUBMIT THE COMPLETED FORM TO [address] [place] [address] [phone] or VIA E-MAIL TO [email or web address]    Signature: Josie Koenig Date:  10/01/19    Confidentiality Notice: The documents accompanying this telecopy may contain confidential information belonging to the sender  The information is intended only for the use of the individual named above  If you are not the intended recipient, you are hereby notified  That any disclosure, copying, distribution or taking of any telecopy is strictly prohibited

## 2020-11-27 ENCOUNTER — HOSPITAL ENCOUNTER (EMERGENCY)
Facility: HOSPITAL | Age: 35
Discharge: HOME | End: 2020-11-27
Attending: EMERGENCY MEDICINE | Admitting: EMERGENCY MEDICINE

## 2020-11-27 ENCOUNTER — APPOINTMENT (EMERGENCY)
Dept: RADIOLOGY | Facility: HOSPITAL | Age: 35
End: 2020-11-27

## 2020-11-27 VITALS
OXYGEN SATURATION: 100 % | DIASTOLIC BLOOD PRESSURE: 93 MMHG | RESPIRATION RATE: 16 BRPM | WEIGHT: 152 LBS | SYSTOLIC BLOOD PRESSURE: 143 MMHG | HEART RATE: 62 BPM | TEMPERATURE: 97.8 F | BODY MASS INDEX: 29.69 KG/M2

## 2020-11-27 DIAGNOSIS — R07.9 CHEST PAIN: Primary | ICD-10-CM

## 2020-11-27 LAB
ANION GAP SERPL CALCULATED.3IONS-SCNC: 9 MMOL/L (ref 4–13)
BASOPHILS # BLD AUTO: 0.06 THOUSANDS/ΜL (ref 0–0.1)
BASOPHILS NFR BLD AUTO: 1 % (ref 0–1)
BUN SERPL-MCNC: 17 MG/DL (ref 5–25)
CALCIUM SERPL-MCNC: 8.8 MG/DL (ref 8.3–10.1)
CHLORIDE SERPL-SCNC: 103 MMOL/L (ref 100–108)
CO2 SERPL-SCNC: 27 MMOL/L (ref 21–32)
CREAT SERPL-MCNC: 0.82 MG/DL (ref 0.6–1.3)
EOSINOPHIL # BLD AUTO: 0.21 THOUSAND/ΜL (ref 0–0.61)
EOSINOPHIL NFR BLD AUTO: 3 % (ref 0–6)
ERYTHROCYTE [DISTWIDTH] IN BLOOD BY AUTOMATED COUNT: 11.4 % (ref 11.6–15.1)
EXT PREG TEST URINE: NEGATIVE
EXT. CONTROL ED NAV: NORMAL
GFR SERPL CREATININE-BSD FRML MDRD: 94 ML/MIN/1.73SQ M
GLUCOSE SERPL-MCNC: 81 MG/DL (ref 65–140)
HCT VFR BLD AUTO: 38.3 % (ref 34.8–46.1)
HGB BLD-MCNC: 13 G/DL (ref 11.5–15.4)
IMM GRANULOCYTES # BLD AUTO: 0.01 THOUSAND/UL (ref 0–0.2)
IMM GRANULOCYTES NFR BLD AUTO: 0 % (ref 0–2)
LYMPHOCYTES # BLD AUTO: 3.3 THOUSANDS/ΜL (ref 0.6–4.47)
LYMPHOCYTES NFR BLD AUTO: 43 % (ref 14–44)
MCH RBC QN AUTO: 31.3 PG (ref 26.8–34.3)
MCHC RBC AUTO-ENTMCNC: 33.9 G/DL (ref 31.4–37.4)
MCV RBC AUTO: 92 FL (ref 82–98)
MONOCYTES # BLD AUTO: 0.44 THOUSAND/ΜL (ref 0.17–1.22)
MONOCYTES NFR BLD AUTO: 6 % (ref 4–12)
NEUTROPHILS # BLD AUTO: 3.64 THOUSANDS/ΜL (ref 1.85–7.62)
NEUTS SEG NFR BLD AUTO: 47 % (ref 43–75)
NRBC BLD AUTO-RTO: 0 /100 WBCS
PLATELET # BLD AUTO: 371 THOUSANDS/UL (ref 149–390)
PMV BLD AUTO: 10.9 FL (ref 8.9–12.7)
POTASSIUM SERPL-SCNC: 3.3 MMOL/L (ref 3.5–5.3)
RBC # BLD AUTO: 4.16 MILLION/UL (ref 3.81–5.12)
SODIUM SERPL-SCNC: 139 MMOL/L (ref 136–145)
TROPONIN I SERPL-MCNC: <0.02 NG/ML
TROPONIN I SERPL-MCNC: <0.02 NG/ML
WBC # BLD AUTO: 7.66 THOUSAND/UL (ref 4.31–10.16)

## 2020-11-27 PROCEDURE — 36415 COLL VENOUS BLD VENIPUNCTURE: CPT | Performed by: EMERGENCY MEDICINE

## 2020-11-27 PROCEDURE — 85025 COMPLETE CBC W/AUTO DIFF WBC: CPT | Performed by: EMERGENCY MEDICINE

## 2020-11-27 PROCEDURE — 71045 X-RAY EXAM CHEST 1 VIEW: CPT

## 2020-11-27 PROCEDURE — 80048 BASIC METABOLIC PNL TOTAL CA: CPT | Performed by: EMERGENCY MEDICINE

## 2020-11-27 PROCEDURE — 84484 ASSAY OF TROPONIN QUANT: CPT | Performed by: EMERGENCY MEDICINE

## 2020-11-27 PROCEDURE — 99285 EMERGENCY DEPT VISIT HI MDM: CPT

## 2020-11-27 PROCEDURE — 81025 URINE PREGNANCY TEST: CPT | Performed by: EMERGENCY MEDICINE

## 2020-11-27 PROCEDURE — 96374 THER/PROPH/DIAG INJ IV PUSH: CPT

## 2020-11-27 PROCEDURE — 99284 EMERGENCY DEPT VISIT MOD MDM: CPT | Performed by: EMERGENCY MEDICINE

## 2020-11-27 RX ORDER — KETOROLAC TROMETHAMINE 30 MG/ML
15 INJECTION, SOLUTION INTRAMUSCULAR; INTRAVENOUS ONCE
Status: COMPLETED | OUTPATIENT
Start: 2020-11-27 | End: 2020-11-27

## 2020-11-27 RX ORDER — POTASSIUM CHLORIDE 20 MEQ/1
40 TABLET, EXTENDED RELEASE ORAL ONCE
Status: COMPLETED | OUTPATIENT
Start: 2020-11-27 | End: 2020-11-27

## 2020-11-27 RX ORDER — ACETAMINOPHEN 325 MG/1
650 TABLET ORAL ONCE
Status: COMPLETED | OUTPATIENT
Start: 2020-11-27 | End: 2020-11-27

## 2020-11-27 RX ORDER — NAPROXEN 500 MG/1
500 TABLET ORAL 2 TIMES DAILY PRN
Qty: 14 TABLET | Refills: 0 | Status: SHIPPED | OUTPATIENT
Start: 2020-11-27 | End: 2022-07-12 | Stop reason: ALTCHOICE

## 2020-11-27 RX ADMIN — ACETAMINOPHEN 650 MG: 325 TABLET ORAL at 22:26

## 2020-11-27 RX ADMIN — POTASSIUM CHLORIDE 40 MEQ: 1500 TABLET, EXTENDED RELEASE ORAL at 22:11

## 2020-11-27 RX ADMIN — KETOROLAC TROMETHAMINE 15 MG: 30 INJECTION, SOLUTION INTRAMUSCULAR at 20:54

## 2021-01-10 ENCOUNTER — HOSPITAL ENCOUNTER (EMERGENCY)
Facility: HOSPITAL | Age: 36
Discharge: HOME | End: 2021-01-10
Attending: EMERGENCY MEDICINE | Admitting: EMERGENCY MEDICINE
Payer: MEDICAID

## 2021-01-10 ENCOUNTER — APPOINTMENT (EMERGENCY)
Dept: ULTRASOUND IMAGING | Facility: HOSPITAL | Age: 36
End: 2021-01-10
Payer: MEDICAID

## 2021-01-10 VITALS
DIASTOLIC BLOOD PRESSURE: 70 MMHG | WEIGHT: 150 LBS | HEART RATE: 81 BPM | RESPIRATION RATE: 18 BRPM | SYSTOLIC BLOOD PRESSURE: 121 MMHG | TEMPERATURE: 97.2 F | OXYGEN SATURATION: 99 %

## 2021-01-10 DIAGNOSIS — O20.0 THREATENED MISCARRIAGE: ICD-10-CM

## 2021-01-10 DIAGNOSIS — R10.9 ABDOMINAL PAIN DURING PREGNANCY: Primary | ICD-10-CM

## 2021-01-10 DIAGNOSIS — O26.899 ABDOMINAL PAIN DURING PREGNANCY: Primary | ICD-10-CM

## 2021-01-10 LAB
ALBUMIN SERPL BCP-MCNC: 3.7 G/DL (ref 3.5–5)
ALP SERPL-CCNC: 60 U/L (ref 46–116)
ALT SERPL W P-5'-P-CCNC: 24 U/L (ref 12–78)
ANION GAP SERPL CALCULATED.3IONS-SCNC: 8 MMOL/L (ref 4–13)
AST SERPL W P-5'-P-CCNC: 12 U/L (ref 5–45)
B-HCG SERPL-ACNC: 5585 MIU/ML
BASOPHILS # BLD AUTO: 0.07 THOUSANDS/ΜL (ref 0–0.1)
BASOPHILS NFR BLD AUTO: 1 % (ref 0–1)
BILIRUB SERPL-MCNC: 0.4 MG/DL (ref 0.2–1)
BUN SERPL-MCNC: 11 MG/DL (ref 5–25)
CALCIUM SERPL-MCNC: 9 MG/DL (ref 8.3–10.1)
CHLORIDE SERPL-SCNC: 103 MMOL/L (ref 100–108)
CLARITY, POC: CLEAR
CO2 SERPL-SCNC: 28 MMOL/L (ref 21–32)
COLOR, POC: YELLOW
CREAT SERPL-MCNC: 0.79 MG/DL (ref 0.6–1.3)
EOSINOPHIL # BLD AUTO: 0.17 THOUSAND/ΜL (ref 0–0.61)
EOSINOPHIL NFR BLD AUTO: 2 % (ref 0–6)
ERYTHROCYTE [DISTWIDTH] IN BLOOD BY AUTOMATED COUNT: 11.8 % (ref 11.6–15.1)
EXT BILIRUBIN, UA: ABNORMAL
EXT BLOOD URINE: ABNORMAL
EXT GLUCOSE, UA: ABNORMAL
EXT KETONES: ABNORMAL
EXT NITRITE, UA: ABNORMAL
EXT PH, UA: 7
EXT PREG TEST URINE: POSITIVE
EXT PROTEIN, UA: ABNORMAL
EXT SPECIFIC GRAVITY, UA: 1
EXT UROBILINOGEN: 0.2
EXT. CONTROL ED NAV: ABNORMAL
GFR SERPL CREATININE-BSD FRML MDRD: 99 ML/MIN/1.73SQ M
GLUCOSE SERPL-MCNC: 83 MG/DL (ref 65–140)
HCT VFR BLD AUTO: 39.1 % (ref 34.8–46.1)
HGB BLD-MCNC: 13.2 G/DL (ref 11.5–15.4)
IMM GRANULOCYTES # BLD AUTO: 0.02 THOUSAND/UL (ref 0–0.2)
IMM GRANULOCYTES NFR BLD AUTO: 0 % (ref 0–2)
LYMPHOCYTES # BLD AUTO: 2.53 THOUSANDS/ΜL (ref 0.6–4.47)
LYMPHOCYTES NFR BLD AUTO: 24 % (ref 14–44)
MCH RBC QN AUTO: 31.6 PG (ref 26.8–34.3)
MCHC RBC AUTO-ENTMCNC: 33.8 G/DL (ref 31.4–37.4)
MCV RBC AUTO: 94 FL (ref 82–98)
MONOCYTES # BLD AUTO: 0.74 THOUSAND/ΜL (ref 0.17–1.22)
MONOCYTES NFR BLD AUTO: 7 % (ref 4–12)
NEUTROPHILS # BLD AUTO: 7.24 THOUSANDS/ΜL (ref 1.85–7.62)
NEUTS SEG NFR BLD AUTO: 66 % (ref 43–75)
NRBC BLD AUTO-RTO: 0 /100 WBCS
PLATELET # BLD AUTO: 328 THOUSANDS/UL (ref 149–390)
PMV BLD AUTO: 10.3 FL (ref 8.9–12.7)
POTASSIUM SERPL-SCNC: 3.8 MMOL/L (ref 3.5–5.3)
PROT SERPL-MCNC: 7.1 G/DL (ref 6.4–8.2)
RBC # BLD AUTO: 4.18 MILLION/UL (ref 3.81–5.12)
SODIUM SERPL-SCNC: 139 MMOL/L (ref 136–145)
WBC # BLD AUTO: 10.77 THOUSAND/UL (ref 4.31–10.16)
WBC # BLD EST: ABNORMAL 10*3/UL

## 2021-01-10 PROCEDURE — 36415 COLL VENOUS BLD VENIPUNCTURE: CPT | Performed by: PHYSICIAN ASSISTANT

## 2021-01-10 PROCEDURE — 81002 URINALYSIS NONAUTO W/O SCOPE: CPT | Performed by: PHYSICIAN ASSISTANT

## 2021-01-10 PROCEDURE — 86850 RBC ANTIBODY SCREEN: CPT | Performed by: PHYSICIAN ASSISTANT

## 2021-01-10 PROCEDURE — 76801 OB US < 14 WKS SINGLE FETUS: CPT

## 2021-01-10 PROCEDURE — 84702 CHORIONIC GONADOTROPIN TEST: CPT | Performed by: PHYSICIAN ASSISTANT

## 2021-01-10 PROCEDURE — 87491 CHLMYD TRACH DNA AMP PROBE: CPT | Performed by: PHYSICIAN ASSISTANT

## 2021-01-10 PROCEDURE — 86900 BLOOD TYPING SEROLOGIC ABO: CPT | Performed by: PHYSICIAN ASSISTANT

## 2021-01-10 PROCEDURE — 87591 N.GONORRHOEAE DNA AMP PROB: CPT | Performed by: PHYSICIAN ASSISTANT

## 2021-01-10 PROCEDURE — 81025 URINE PREGNANCY TEST: CPT | Performed by: PHYSICIAN ASSISTANT

## 2021-01-10 PROCEDURE — 80053 COMPREHEN METABOLIC PANEL: CPT | Performed by: PHYSICIAN ASSISTANT

## 2021-01-10 PROCEDURE — 85025 COMPLETE CBC W/AUTO DIFF WBC: CPT | Performed by: PHYSICIAN ASSISTANT

## 2021-01-10 PROCEDURE — 87070 CULTURE OTHR SPECIMN AEROBIC: CPT | Performed by: PHYSICIAN ASSISTANT

## 2021-01-10 PROCEDURE — 87106 FUNGI IDENTIFICATION YEAST: CPT | Performed by: PHYSICIAN ASSISTANT

## 2021-01-10 PROCEDURE — 99284 EMERGENCY DEPT VISIT MOD MDM: CPT

## 2021-01-10 PROCEDURE — 86901 BLOOD TYPING SEROLOGIC RH(D): CPT | Performed by: PHYSICIAN ASSISTANT

## 2021-01-10 PROCEDURE — 99284 EMERGENCY DEPT VISIT MOD MDM: CPT | Performed by: PHYSICIAN ASSISTANT

## 2021-01-10 NOTE — DISCHARGE INSTRUCTIONS
Rest, increase fluids  Call OB/GYN for recheck  Take a prenatal vitamin  If dizzy, lightheaded, bleeding worsens return to ER

## 2021-01-10 NOTE — ED NOTES
[place], [address] and this nurse at bedside for pelvic exam       Wale Harris RN  01/10/21 9017

## 2021-01-10 NOTE — ED PROVIDER NOTES
History  Chief Complaint   Patient presents with    Abdominal Pain Pregnant     Patient states that she missed her period and that she had a positive pregnancy test and has been bleeding small amounts for three days and is experiencing abdominal cramping  Patient is a 36 y/o F,  that presents to the ED with lower abdominal cramping and vaginal bleeding for 3 days  LNMP was 2020  She took 2 home preg tests, 1 was positive, 1 showed error  She does not currently have an OB/GYN doctor  No history of ectopic pregnancy  She does not know her blood type  History provided by:  Patient  Pregnancy Problem  Quality:  Bright red  Severity:  Mild  Onset quality:  Gradual  Duration:  3 days  Timing:  Constant  Progression:  Unchanged  Chronicity:  New  Prior pregnancy: yes    Pregnancy confirmed by ultrasound: no    Gestational age:  About 8 weeks  Prenatal care: No prenatal care  Relieved by:  Nothing  Worsened by:  Nothing  Ineffective treatments:  None tried  Associated symptoms: abdominal pain    Associated symptoms: no back pain, no dizziness, no dysuria, no fever, no nausea and no vaginal discharge    Risk factors: no prior miscarriage        None       History reviewed  No pertinent past medical history  Past Surgical History:   Procedure Laterality Date     SECTION      CHOLECYSTECTOMY         History reviewed  No pertinent family history  I have reviewed and agree with the history as documented  E-Cigarette/Vaping     E-Cigarette/Vaping Substances     Social History     Tobacco Use    Smoking status: Never Smoker    Smokeless tobacco: Never Used   Substance Use Topics    Alcohol use: Not Currently    Drug use: Not Currently       Review of Systems   Constitutional: Negative for chills and fever  HENT: Negative  Respiratory: Negative for cough and shortness of breath  Cardiovascular: Negative for chest pain  Gastrointestinal: Positive for abdominal pain   Negative for diarrhea, nausea and vomiting  Genitourinary: Positive for vaginal bleeding  Negative for dysuria and vaginal discharge  Musculoskeletal: Negative for back pain and neck pain  Skin: Negative for color change and rash  Neurological: Negative for dizziness, weakness and numbness  Psychiatric/Behavioral: Negative for confusion  All other systems reviewed and are negative  Physical Exam  Physical Exam  Vitals signs and nursing note reviewed  Exam conducted with a chaperone present  Constitutional:       General: She is not in acute distress  Appearance: Normal appearance  She is well-developed, well-groomed and normal weight  She is not ill-appearing  HENT:      Head: Normocephalic and atraumatic  Eyes:      Conjunctiva/sclera: Conjunctivae normal    Neck:      Musculoskeletal: Normal range of motion  Cardiovascular:      Rate and Rhythm: Normal rate and regular rhythm  Heart sounds: Normal heart sounds  Pulmonary:      Effort: Pulmonary effort is normal       Breath sounds: Normal breath sounds  No wheezing, rhonchi or rales  Abdominal:      General: Abdomen is flat  Bowel sounds are normal       Palpations: Abdomen is soft  Tenderness: There is abdominal tenderness in the left lower quadrant  Genitourinary:     Exam position: Supine  Pubic Area: No rash  Labia:         Right: No rash, tenderness, lesion or injury  Left: No rash, tenderness, lesion or injury  Comments: Mild dark red blood coming from cervical ox  Cervix closed  Musculoskeletal: Normal range of motion  Right lower leg: No edema  Left lower leg: No edema  Skin:     General: Skin is warm and dry  Findings: No rash  Neurological:      General: No focal deficit present  Mental Status: She is alert and oriented to person, place, and time  Psychiatric:         Mood and Affect: Mood normal          Behavior: Behavior is cooperative           Vital Signs  ED Triage Vitals   Temperature Pulse Respirations Blood Pressure SpO2   01/10/21 1053 01/10/21 1053 01/10/21 1053 01/10/21 1053 01/10/21 1053   (!) 97 2 °F (36 2 °C) 79 18 124/82 100 %      Temp src Heart Rate Source Patient Position - Orthostatic VS BP Location FiO2 (%)   -- 01/10/21 1519 01/10/21 1053 01/10/21 1053 --    Monitor Sitting Left arm       Pain Score       01/10/21 1517       4           Vitals:    01/10/21 1053 01/10/21 1519   BP: 124/82 121/70   Pulse: 79 81   Patient Position - Orthostatic VS: Sitting Lying         Visual Acuity      ED Medications  Medications - No data to display    Diagnostic Studies  Results Reviewed     Procedure Component Value Units Date/Time    Quantitative hCG [[id number]]  (Abnormal) Collected: 01/10/21 1201    Lab Status: Final result Specimen: Blood from Arm, Right Updated: 01/10/21 1308     HCG, Quant 5,585 mIU/mL     Narrative:       Expected Ranges:     Approximate               Approximate HCG  Gestation age          Concentration ( mIU/mL)  _____________          ______________________   Dulcy Pluck                      HCG values  0 2-1                       5-50  1-2                           2-3                         100-5000  3-4                         500-51103  4-5                         1000-20859  5-6                         23280-623311  6-8                         42625-891190  8-12                        18904-831867      Genital Comprehensive Culture [[id number]] Collected: 01/10/21 1253    Lab Status:  In process Specimen: Genital from Cervix Updated: 01/10/21 1259    Comprehensive metabolic panel [[id number]] Collected: 01/10/21 1201    Lab Status: Final result Specimen: Blood from Arm, Right Updated: 01/10/21 1234     Sodium 139 mmol/L      Potassium 3 8 mmol/L      Chloride 103 mmol/L      CO2 28 mmol/L      ANION GAP 8 mmol/L      BUN 11 mg/dL      Creatinine 0 79 mg/dL      Glucose 83 mg/dL      Calcium 9 0 mg/dL      AST 12 U/L      ALT 24 U/L Alkaline Phosphatase 60 U/L      Total Protein 7 1 g/dL      Albumin 3 7 g/dL      Total Bilirubin 0 40 mg/dL      eGFR 99 ml/min/1 73sq m     Narrative:      [place] guidelines for Chronic Kidney Disease (CKD):     Stage 1 with normal or high GFR (GFR > 90 mL/min/1 73 square meters)    Stage 2 Mild CKD (GFR = 60-89 mL/min/1 73 square meters)    Stage 3A Moderate CKD (GFR = 45-59 mL/min/1 73 square meters)    Stage 3B Moderate CKD (GFR = 30-44 mL/min/1 73 square meters)    Stage 4 Severe CKD (GFR = 15-29 mL/min/1 73 square meters)    Stage 5 End Stage CKD (GFR <15 mL/min/1 73 square meters)  Note: GFR calculation is accurate only with a steady state creatinine    CBC and differential [[id number]]  (Abnormal) Collected: 01/10/21 1201    Lab Status: Final result Specimen: Blood from Arm, Right Updated: 01/10/21 1208     WBC 10 77 Thousand/uL      RBC 4 18 Million/uL      Hemoglobin 13 2 g/dL      Hematocrit 39 1 %      MCV 94 fL      MCH 31 6 pg      MCHC 33 8 g/dL      RDW 11 8 %      MPV 10 3 fL      Platelets 685 Thousands/uL      nRBC 0 /100 WBCs      Neutrophils Relative 66 %      Immat GRANS % 0 %      Lymphocytes Relative 24 %      Monocytes Relative 7 %      Eosinophils Relative 2 %      Basophils Relative 1 %      Neutrophils Absolute 7 24 Thousands/µL      Immature Grans Absolute 0 02 Thousand/uL      Lymphocytes Absolute 2 53 Thousands/µL      Monocytes Absolute 0 74 Thousand/µL      Eosinophils Absolute 0 17 Thousand/µL      Basophils Absolute 0 07 Thousands/µL     Chlamydia/GC amplified DNA by PCR [[id number]] Collected: 01/10/21 1203    Lab Status:  In process Specimen: Urine, Other Updated: 01/10/21 1207    POCT urinalysis dipstick [[id number]]  (Abnormal) Resulted: 01/10/21 1202    Lab Status: Final result Specimen: Urine Updated: 01/10/21 1202     Color, UA YELLOW     Clarity, UA CLEAR     Glucose, UA (Ref: Negative) NEG     Bilirubin, UA (Ref: Negative) NEG     Ketones, UA (Ref: Negative) NEG     Spec Grav, UA (Ref:1 003-1 030) 1 005     Blood, UA (Ref: Negative) SMALL     pH, UA (Ref: 4 5-8 0) 7 0     Protein, UA (Ref: Negative) NEG     Urobilinogen, UA (Ref: 0 2- 1 0) 0 2      Leukocytes, UA (Ref: Negative) NEG     Nitrite, UA (Ref: Negative) NEG    POCT pregnancy, urine [[id number]]  (Abnormal) Resulted: 01/10/21 120    Lab Status: Final result Updated: 01/10/21 120     EXT PREG TEST UR (Ref: Negative) POSITIVE     Control VALID                 US OB < 14 weeks with transvaginal   Final Result by Romelia Palma MD (01/10 144)      Single live intrauterine gestation at 6 weeks 1 days (range +/- 4)  SAMMIE of 2021  Workstation performed: [id number]                    Procedures  Procedures         ED Course  ED Course as of Alexy 10 160   Aneljorje Dumont Alexy 10, 2021   1315 SPoke with Jay Britton from ultrasound  She is currently scanning an outpatient and then will be over to see patient  SBIRT 22yo+      Most Recent Value   SBIRT (22 yo +)   In order to provide better care to our patients, we are screening all of our patients for alcohol and drug use  Would it be okay to ask you these screening questions? No Filed at: 01/10/2021 1218                    MDM  Number of Diagnoses or Management Options  Abdominal pain during pregnancy: new and requires workup  Threatened miscarriage: new and requires workup  Diagnosis management comments: Patient with pos home preg test, will order labs, BHCG, u/s to r/o ectopic pregnancy  Patient with IUP on u/s, vaginal bleeding, will refer to OB/GYN for recheck  Concern for threatened   Patient's blood type O+, no need for Rhogam    Return precautions given          Amount and/or Complexity of Data Reviewed  Clinical lab tests: ordered and reviewed  Tests in the radiology section of CPT®: ordered and reviewed    Patient Progress  Patient progress: stable      Disposition  Final diagnoses:   Abdominal pain during pregnancy   Threatened miscarriage     Time reflects when diagnosis was documented in both MDM as applicable and the Disposition within this note     Time User Action Codes Description Comment    1/10/2021  3:22 PM Alessandro Garrison [K62 861,  R10 9] Abdominal pain during pregnancy     1/10/2021  3:22 PM Alessandro Garrison [O20 0] Threatened miscarriage       ED Disposition     ED Disposition Condition Date/Time Comment    Discharge Stable Sun Alexy 10, 2021  3:22 PM Aurelia Ge discharge to home/self care  Follow-up Information     Follow up With Specialties Details Why Contact Info Additional [address] [facility] Schedule an appointment as soon as possible for a visit  For recheck [address] [place] [id number] [phone] CURTIS PEPPERXVOGATCLAUS QSKYAQZRANGELITA, [address] [place] [address], [phone]          There are no discharge medications for this patient  No discharge procedures on file      PDMP Review     None          ED Provider  Electronically Signed by           Tyler Perera PA-C  01/10/21 1601

## 2021-01-11 ENCOUNTER — TELEPHONE (OUTPATIENT)
Dept: OBGYN CLINIC | Facility: CLINIC | Age: 36
End: 2021-01-11

## 2021-01-11 ENCOUNTER — APPOINTMENT (EMERGENCY)
Dept: ULTRASOUND IMAGING | Facility: HOSPITAL | Age: 36
End: 2021-01-11

## 2021-01-11 ENCOUNTER — HOSPITAL ENCOUNTER (EMERGENCY)
Facility: HOSPITAL | Age: 36
Discharge: HOME | End: 2021-01-11
Attending: EMERGENCY MEDICINE
Payer: MEDICAID

## 2021-01-11 VITALS
WEIGHT: 150 LBS | DIASTOLIC BLOOD PRESSURE: 73 MMHG | HEIGHT: 60 IN | RESPIRATION RATE: 16 BRPM | HEART RATE: 80 BPM | BODY MASS INDEX: 29.45 KG/M2 | SYSTOLIC BLOOD PRESSURE: 102 MMHG | TEMPERATURE: 97.7 F | OXYGEN SATURATION: 100 %

## 2021-01-11 DIAGNOSIS — O03.9 MISCARRIAGE: Primary | ICD-10-CM

## 2021-01-11 LAB
ABO GROUP BLD: NORMAL
ABO GROUP BLD: NORMAL
B-HCG SERPL-ACNC: 3530 MIU/ML
BASOPHILS # BLD AUTO: 0.07 THOUSANDS/ΜL (ref 0–0.1)
BASOPHILS NFR BLD AUTO: 1 % (ref 0–1)
BLD GP AB SCN SERPL QL: NEGATIVE
C TRACH DNA SPEC QL NAA+PROBE: NEGATIVE
CLARITY, POC: CLEAR
COLOR, POC: NORMAL
EOSINOPHIL # BLD AUTO: 0.25 THOUSAND/ΜL (ref 0–0.61)
EOSINOPHIL NFR BLD AUTO: 2 % (ref 0–6)
ERYTHROCYTE [DISTWIDTH] IN BLOOD BY AUTOMATED COUNT: 11.7 % (ref 11.6–15.1)
EXT BILIRUBIN, UA: NORMAL
EXT BLOOD URINE: NORMAL
EXT GLUCOSE, UA: NORMAL
EXT KETONES: NORMAL
EXT NITRITE, UA: NORMAL
EXT PH, UA: 7.5
EXT PREG TEST URINE: POSITIVE
EXT PROTEIN, UA: NORMAL
EXT SPECIFIC GRAVITY, UA: 1.01
EXT UROBILINOGEN: 0.2
EXT. CONTROL ED NAV: ABNORMAL
HCT VFR BLD AUTO: 38.4 % (ref 34.8–46.1)
HGB BLD-MCNC: 13 G/DL (ref 11.5–15.4)
IMM GRANULOCYTES # BLD AUTO: 0.03 THOUSAND/UL (ref 0–0.2)
IMM GRANULOCYTES NFR BLD AUTO: 0 % (ref 0–2)
LYMPHOCYTES # BLD AUTO: 2.47 THOUSANDS/ΜL (ref 0.6–4.47)
LYMPHOCYTES NFR BLD AUTO: 23 % (ref 14–44)
MCH RBC QN AUTO: 31.6 PG (ref 26.8–34.3)
MCHC RBC AUTO-ENTMCNC: 33.9 G/DL (ref 31.4–37.4)
MCV RBC AUTO: 93 FL (ref 82–98)
MONOCYTES # BLD AUTO: 0.68 THOUSAND/ΜL (ref 0.17–1.22)
MONOCYTES NFR BLD AUTO: 6 % (ref 4–12)
N GONORRHOEA DNA SPEC QL NAA+PROBE: NEGATIVE
NEUTROPHILS # BLD AUTO: 7.38 THOUSANDS/ΜL (ref 1.85–7.62)
NEUTS SEG NFR BLD AUTO: 68 % (ref 43–75)
NRBC BLD AUTO-RTO: 0 /100 WBCS
PLATELET # BLD AUTO: 339 THOUSANDS/UL (ref 149–390)
PMV BLD AUTO: 10.4 FL (ref 8.9–12.7)
RBC # BLD AUTO: 4.11 MILLION/UL (ref 3.81–5.12)
RH BLD: POSITIVE
RH BLD: POSITIVE
SPECIMEN EXPIRATION DATE: NORMAL
WBC # BLD AUTO: 10.88 THOUSAND/UL (ref 4.31–10.16)
WBC # BLD EST: NORMAL 10*3/UL

## 2021-01-11 PROCEDURE — 81002 URINALYSIS NONAUTO W/O SCOPE: CPT | Performed by: EMERGENCY MEDICINE

## 2021-01-11 PROCEDURE — 86850 RBC ANTIBODY SCREEN: CPT | Performed by: EMERGENCY MEDICINE

## 2021-01-11 PROCEDURE — 99284 EMERGENCY DEPT VISIT MOD MDM: CPT | Performed by: EMERGENCY MEDICINE

## 2021-01-11 PROCEDURE — 99284 EMERGENCY DEPT VISIT MOD MDM: CPT

## 2021-01-11 PROCEDURE — 86900 BLOOD TYPING SEROLOGIC ABO: CPT | Performed by: EMERGENCY MEDICINE

## 2021-01-11 PROCEDURE — 36415 COLL VENOUS BLD VENIPUNCTURE: CPT | Performed by: EMERGENCY MEDICINE

## 2021-01-11 PROCEDURE — 86901 BLOOD TYPING SEROLOGIC RH(D): CPT | Performed by: EMERGENCY MEDICINE

## 2021-01-11 PROCEDURE — 84702 CHORIONIC GONADOTROPIN TEST: CPT | Performed by: EMERGENCY MEDICINE

## 2021-01-11 PROCEDURE — 85025 COMPLETE CBC W/AUTO DIFF WBC: CPT | Performed by: EMERGENCY MEDICINE

## 2021-01-11 PROCEDURE — 81025 URINE PREGNANCY TEST: CPT | Performed by: EMERGENCY MEDICINE

## 2021-01-11 PROCEDURE — 76801 OB US < 14 WKS SINGLE FETUS: CPT

## 2021-01-11 NOTE — TELEPHONE ENCOUNTER
Patient calling stating that she was at the ER yesterday with abdominal pain and bleeding, they told her at the ER that she is  Pregnant  And they recommended she call us  She has medical assistant from [place], I told her that she needs to call our financial counselors and gave her the phone number  Please call patient in [facility]

## 2021-01-11 NOTE — ED PROVIDER NOTES
History  Chief Complaint   Patient presents with    Vaginal Bleeding     patient presents to the ED with c/o adominal pain and vaginal bleeding, states 6 weeks pregnant      37 YOF  approx 6wks pregnant by dates with LMP of  presents for 3 days of constant vaginal bleeding with lower abdominal pain  Nothing has made the symptoms better  Patient without prenatal care at this point  No complications with previous pregnancy  Prior to Admission Medications   Prescriptions Last Dose Informant Patient Reported? Taking?   acetaminophen (TYLENOL) 500 mg tablet  Spouse/Significant Other Yes No   Sig: Take 500 mg by mouth every 6 (six) hours as needed for mild pain   naproxen (EC NAPROSYN) 500 MG EC tablet   No No   Sig: Take 1 tablet (500 mg total) by mouth 2 (two) times a day as needed for mild pain for up to 7 days      Facility-Administered Medications: None       Past Medical History:   Diagnosis Date    Cholelithiasis        Past Surgical History:   Procedure Laterality Date     SECTION          CHOLECYSTECTOMY LAPAROSCOPIC N/A 2019    Procedure: CHOLECYSTECTOMY LAPAROSCOPIC;  Surgeon: Mi Cosby MD;  Location: [address] [place] [address];  Service: General       Family History   Problem Relation Age of Onset    Alcohol abuse Mother     Arthritis Father      I have reviewed and agree with the history as documented  E-Cigarette/Vaping    E-Cigarette Use Never User      E-Cigarette/Vaping Substances    Nicotine No     THC No     CBD No     Flavoring No     Other No     Unknown No      Social History     Tobacco Use    Smoking status: Never Smoker    Smokeless tobacco: Never Used   Substance Use Topics    Alcohol use: Never     Frequency: Never    Drug use: Never       Review of Systems   Constitutional: Negative for chills, fatigue and fever  HENT: Negative for sore throat  Eyes: Negative for visual disturbance  Respiratory: Negative for shortness of breath  Cardiovascular: Negative for chest pain  Gastrointestinal: Positive for abdominal pain  Negative for diarrhea, nausea and vomiting  Genitourinary: Positive for pelvic pain and vaginal bleeding  Negative for difficulty urinating and dysuria  Musculoskeletal: Negative for back pain  Skin: Negative for rash  Neurological: Negative for syncope, weakness and headaches  All other systems reviewed and are negative  Physical Exam  Physical Exam  Vitals signs and nursing note reviewed  Exam conducted with a chaperone present (Aleida MILES)  Constitutional:       General: She is not in acute distress  Appearance: She is well-developed  HENT:      Head: Normocephalic and atraumatic  Right Ear: External ear normal       Left Ear: External ear normal    Eyes:      General: No scleral icterus  Conjunctiva/sclera: Conjunctivae normal       Pupils: Pupils are equal, round, and reactive to light  Neck:      Musculoskeletal: Normal range of motion  Cardiovascular:      Rate and Rhythm: Normal rate and regular rhythm  Heart sounds: Normal heart sounds  Pulmonary:      Effort: Pulmonary effort is normal  No respiratory distress  Breath sounds: Normal breath sounds  Abdominal:      General: Bowel sounds are normal       Palpations: Abdomen is soft  Tenderness: There is abdominal tenderness in the right lower quadrant, suprapubic area and left lower quadrant  There is no guarding or rebound  Genitourinary:     General: Normal vulva  Cervix: Cervical bleeding present  Uterus: Not tender  Adnexa: Right adnexa normal         Left: Tenderness present  No mass or fullness  Musculoskeletal: Normal range of motion  Skin:     General: Skin is warm and dry  Findings: No rash  Neurological:      Mental Status: She is alert and oriented to person, place, and time           Vital Signs  ED Triage Vitals [01/11/21 1546]   Temperature Pulse Respirations Blood Pressure SpO2   97 7 °F (36 5 °C) 88 20 105/72 100 %      Temp Source Heart Rate Source Patient Position - Orthostatic VS BP Location FiO2 (%)   Temporal Monitor Sitting Left arm --      Pain Score       Worst Possible Pain           Vitals:    01/11/21 1546 01/11/21 2046   BP: 105/72 102/73   Pulse: 88 80   Patient Position - Orthostatic VS: Sitting Lying         Visual Acuity      ED Medications  Medications - No data to display    Diagnostic Studies  Results Reviewed     Procedure Component Value Units Date/Time    Quantitative hCG [[id number]]  (Abnormal) Collected: 01/11/21 1727    Lab Status: Final result Specimen: Blood from Arm, Left Updated: 01/11/21 1819     HCG, Quant 3,530 mIU/mL     Narrative:       Expected Ranges:     Approximate               Approximate HCG  Gestation age          Concentration ( mIU/mL)  _____________          ______________________   Jose Breeze                      HCG values  0 2-1                       5-50  1-2                           2-3                         100-5000  3-4                         500-69972  4-5                         1000-91584  5-6                         18160-176102  6-8                         13479-509782  8-12                        [id number]      POCT urinalysis dipstick [[id number]]  (Normal) Resulted: 01/11/21 1736    Lab Status: Final result Specimen: Urine Updated: 01/11/21 1737     Color, UA dark yellow     Clarity, UA clear     Glucose, UA (Ref: Negative) neg     Bilirubin, UA (Ref: Negative) neg     Ketones, UA (Ref: Negative) neg     Spec Grav, UA (Ref:1 003-1 030) 1 015     Blood, UA (Ref: Negative) LARGE     pH, UA (Ref: 4 5-8 0) 7 5     Protein, UA (Ref: Negative) neg     Urobilinogen, UA (Ref: 0 2- 1 0) 0 2      Leukocytes, UA (Ref: Negative) neg     Nitrite, UA (Ref: Negative) neg    POCT pregnancy, urine [[id number]]  (Abnormal) Resulted: 01/11/21 1735    Lab Status: Final result Updated: 01/11/21 1736     EXT PREG TEST UR (Ref: Negative) positive     Control valid    CBC and differential [[id number]]  (Abnormal) Collected: 01/11/21 1727    Lab Status: Final result Specimen: Blood from Arm, Left Updated: 01/11/21 1732     WBC 10 88 Thousand/uL      RBC 4 11 Million/uL      Hemoglobin 13 0 g/dL      Hematocrit 38 4 %      MCV 93 fL      MCH 31 6 pg      MCHC 33 9 g/dL      RDW 11 7 %      MPV 10 4 fL      Platelets 324 Thousands/uL      nRBC 0 /100 WBCs      Neutrophils Relative 68 %      Immat GRANS % 0 %      Lymphocytes Relative 23 %      Monocytes Relative 6 %      Eosinophils Relative 2 %      Basophils Relative 1 %      Neutrophils Absolute 7 38 Thousands/µL      Immature Grans Absolute 0 03 Thousand/uL      Lymphocytes Absolute 2 47 Thousands/µL      Monocytes Absolute 0 68 Thousand/µL      Eosinophils Absolute 0 25 Thousand/µL      Basophils Absolute 0 07 Thousands/µL                  US OB < 14 weeks with transvaginal   Final Result by Darcie Hill MD (01/11 2014)   No intrauterine gestation or adnexal mass identified  Given IUP seen yesterday, this is consistent with pregnancy failure  The study was marked in [facility] for immediate notification  Workstation performed: [id number]                    Procedures  Procedures         ED Course  ED Course as of Jan 11 2120   Caroline Lau Jan 11, 2021   1825 Anna from [address] aware of order and concern for ectopic  2022 Ultrasound results reviewed  Further review record demonstrates patient was registered under a different last name yesterday  That chart was reviewed noting that the patient had a pelvic ultrasound yesterday demonstrating an IUP  There is no IUP today and I informed the patient of her miscarriage  I also discussed the need for the patient to follow up with the gyn clinic to ensure completion of the miscarriage her hCG trends back to 0                                   SBIRT 22yo+      Most Recent Value   SBIRT (22 yo +)   In order to provide better care to our patients, we are screening all of our patients for alcohol and drug use  Would it be okay to ask you these screening questions? No Filed at: 01/11/2021 1600   Initial Alcohol Screen: US AUDIT-C    1  How often do you have a drink containing alcohol?  0 Filed at: 01/11/2021 1600   2  How many drinks containing alcohol do you have on a typical day you are drinking? 0 Filed at: 01/11/2021 1600   3a  Male UNDER 65: How often do you have five or more drinks on one occasion? 0 Filed at: 01/11/2021 1600   3b  FEMALE Any Age, or MALE 65+: How often do you have 4 or more drinks on one occassion? 0 Filed at: 01/11/2021 1600   Audit-C Score  0 Filed at: 01/11/2021 1600                    [facility]  Number of Diagnoses or Management Options  Diagnosis management comments: DDx: vaginal bleeding in pregnancy, miscarriage, ectopic pregnancy, ovarian cyst, other  Plan for Urine, labs, pelvic US       Amount and/or Complexity of Data Reviewed  Review and summarize past medical records: yes        Disposition  Final diagnoses:   Miscarriage     Time reflects when diagnosis was documented in both MDM as applicable and the Disposition within this note     Time User Action Codes Description Comment    1/11/2021  8:23 PM Azucena Shannon Add [O03 9] Miscarriage       ED Disposition     ED Disposition Condition Date/Time Comment    Discharge Stable Mon Jan 11, 2021  8:23 PM [facility] discharge to home/self care              Follow-up Information     Follow up With Specialties Details Why Contact Info Additional [address] [facility] Schedule an appointment as soon as possible for a visit  For further evaluation [address], [address] [facility] [address]  [facility], [address], [address] [place], [place], [address] [phone]          Discharge Medication List as of 1/11/2021  8:23 PM      CONTINUE these medications which have NOT CHANGED    Details   acetaminophen (TYLENOL) 500 mg tablet Take 500 mg by mouth every 6 (six) hours as needed for mild pain, Historical Med      naproxen (EC NAPROSYN) 500 MG EC tablet Take 1 tablet (500 mg total) by mouth 2 (two) times a day as needed for mild pain for up to 7 days, Starting Fri 11/27/2020, Until Fri 12/4/2020, Normal           No discharge procedures on file      PDMP Review     None          ED Provider  Electronically Signed by           Geni Allison DO  01/11/21 9354

## 2021-01-12 ENCOUNTER — TELEPHONE (OUTPATIENT)
Dept: OBGYN CLINIC | Facility: CLINIC | Age: 36
End: 2021-01-12

## 2021-01-12 ENCOUNTER — OFFICE VISIT (OUTPATIENT)
Dept: OBGYN CLINIC | Facility: CLINIC | Age: 36
End: 2021-01-12

## 2021-01-12 VITALS — WEIGHT: 152 LBS | DIASTOLIC BLOOD PRESSURE: 97 MMHG | SYSTOLIC BLOOD PRESSURE: 131 MMHG | HEART RATE: 78 BPM

## 2021-01-12 DIAGNOSIS — O03.9 SPONTANEOUS MISCARRIAGE: Primary | ICD-10-CM

## 2021-01-12 LAB
ABO GROUP BLD: NORMAL
ABO GROUP BLD: NORMAL
BLD GP AB SCN SERPL QL: NEGATIVE
RH BLD: POSITIVE
RH BLD: POSITIVE
SPECIMEN EXPIRATION DATE: NORMAL

## 2021-01-12 PROCEDURE — 99213 OFFICE O/P EST LOW 20 MIN: CPT | Performed by: OBSTETRICS & GYNECOLOGY

## 2021-01-12 NOTE — PROGRESS NOTES
GYN Progress Note  Aurelia Kay Asp is a 36y/o  with LMP 2020 who presents today for follow up of early pregnancy loss  Patient was seen in the ED due to vaginal bleeding on 1/10/2021 and found to have an IUP at 6w1d  She again returned to the ED on 2021 with continued bleeding and was found to have no IUP which is consistent with a pregnancy loss  At this time patient reports that her bleeding has decreased significantly  She was passing tangerine size clots before but the clots are much smaller now  She is feeling well otherwise  Hgb on 1/10/2021 was 13 0  Blood type is O+  Vitals:    21 1103   BP: 131/97   Pulse: 78     SSE: minimal blood noted on exam, multiparous appearing cervix, no tissue noted  Bedside ultrasound showing thin endometrial lining      A/P: 36y/o G2 no  s/p complete   Discussed these findings with patient  She is interested in getting pregnant again as soon as possible  Assured patient that she can begin trying to get pregnant again with her next cycle provided that she is emotionally ready to do so  Given that this is her first miscarriage, would not recommend further testing at this time   Discussed with patient that if she were to have recurrent pregnancy loss, she would need further testing      - Recommend prenatal vitamin  - RTC if patient achieves pregnancy or sooner as needed    Christina Cantrell MD, PGY-3  2021  7:01 PM

## 2021-01-13 NOTE — RESULT ENCOUNTER NOTE
Called patient and spoke with her  due to language barrier  Informed her of yeast infection and to use monistat OTC 7 day ovule, not 1 day since she his pregnant

## 2021-01-14 LAB
BACTERIA GENITAL AEROBE CULT: ABNORMAL
BACTERIA GENITAL AEROBE CULT: ABNORMAL

## 2021-01-15 ENCOUNTER — TELEPHONE (OUTPATIENT)
Dept: OBGYN CLINIC | Facility: CLINIC | Age: 36
End: 2021-01-15

## 2021-01-18 ENCOUNTER — APPOINTMENT (OUTPATIENT)
Dept: LAB | Facility: CLINIC | Age: 36
End: 2021-01-18
Payer: MEDICAID

## 2021-01-18 ENCOUNTER — OFFICE VISIT (OUTPATIENT)
Dept: OBGYN CLINIC | Facility: CLINIC | Age: 36
End: 2021-01-18

## 2021-01-18 VITALS
BODY MASS INDEX: 30.31 KG/M2 | WEIGHT: 155.2 LBS | DIASTOLIC BLOOD PRESSURE: 89 MMHG | HEART RATE: 77 BPM | SYSTOLIC BLOOD PRESSURE: 130 MMHG

## 2021-01-18 DIAGNOSIS — O03.9 SPONTANEOUS ABORTION: ICD-10-CM

## 2021-01-18 DIAGNOSIS — O03.9 SPONTANEOUS ABORTION: Primary | ICD-10-CM

## 2021-01-18 LAB — B-HCG SERPL-ACNC: 30 MIU/ML

## 2021-01-18 PROCEDURE — 84702 CHORIONIC GONADOTROPIN TEST: CPT

## 2021-01-18 PROCEDURE — 99213 OFFICE O/P EST LOW 20 MIN: CPT | Performed by: OBSTETRICS & GYNECOLOGY

## 2021-01-18 PROCEDURE — 36415 COLL VENOUS BLD VENIPUNCTURE: CPT

## 2021-01-18 NOTE — PROGRESS NOTES
Assessment/Plan:     No problem-specific Assessment & Plan notes found for this encounter  Diagnoses and all orders for this visit:    Spontaneous   -     hCG, quantitative; Future    RTO  In 3 months for annual exam or upon pregnancy  Subjective:      Patient ID: Sandy Timmons is a 37 y o  female who presents to follow up for SAB  She presented to the ED on 21 with vaginal bleeding  Her HCG quant on 01/10/21 was 5585  Her HCG quant on 20 was 3530  Her blood type is O positive  She had an US in the ED  She is taking PNVs   She denies any further vaginal bleeding  She has occasional twinges on her right side  US images reveal normal ovaries  She desires pregnancy  Her last pap was 3 years ago in [facility] and she reports it as normal   HPI    The following portions of the patient's history were reviewed and updated as appropriate: allergies, current medications, past family history, past medical history, past social history, past surgical history and problem list     Review of Systems      Objective:      /89   Pulse 77   Wt 70 4 kg (155 lb 3 2 oz)   BMI 30 31 kg/m²          Physical Exam  Vitals signs and nursing note reviewed  Constitutional:       Appearance: Normal appearance  Pulmonary:      Effort: Pulmonary effort is normal    Neurological:      General: No focal deficit present  Mental Status: She is alert and oriented to person, place, and time     Psychiatric:         Mood and Affect: Mood normal          Behavior: Behavior normal

## 2021-01-19 ENCOUNTER — TELEPHONE (OUTPATIENT)
Dept: OBGYN CLINIC | Facility: CLINIC | Age: 36
End: 2021-01-19

## 2021-01-19 NOTE — TELEPHONE ENCOUNTER
Farzad Lopez spoke to patient on the phone and discussed HCG levels  Patient is not bleeding anymore, she is to call the office if she has a future concerns

## 2021-03-23 ENCOUNTER — TELEPHONE (OUTPATIENT)
Dept: OBGYN CLINIC | Facility: CLINIC | Age: 36
End: 2021-03-23

## 2021-03-31 ENCOUNTER — OFFICE VISIT (OUTPATIENT)
Dept: OBGYN CLINIC | Facility: CLINIC | Age: 36
End: 2021-03-31

## 2021-03-31 VITALS
WEIGHT: 153.4 LBS | HEART RATE: 59 BPM | DIASTOLIC BLOOD PRESSURE: 84 MMHG | BODY MASS INDEX: 30.12 KG/M2 | SYSTOLIC BLOOD PRESSURE: 120 MMHG | HEIGHT: 60 IN

## 2021-03-31 DIAGNOSIS — Z30.011 ENCOUNTER FOR INITIAL PRESCRIPTION OF CONTRACEPTIVE PILLS: Primary | ICD-10-CM

## 2021-03-31 PROCEDURE — 99203 OFFICE O/P NEW LOW 30 MIN: CPT | Performed by: OBSTETRICS & GYNECOLOGY

## 2021-03-31 RX ORDER — NORGESTIMATE AND ETHINYL ESTRADIOL 0.25-0.035
1 KIT ORAL DAILY
Qty: 84 TABLET | Refills: 1 | Status: SHIPPED | OUTPATIENT
Start: 2021-03-31 | End: 2021-05-04

## 2021-03-31 NOTE — PROGRESS NOTES
Assessment/Plan:    1  Encounter for initial prescription of contraceptive pills  No contraindications, non-smoker, no migraines  Trial of Sprintec  LMP 3/11  Explained to wait until Sunday after next period to start  Standard counseling provided  - norgestimate-ethinyl estradiol (ORTHO-CYCLEN) 0 25-35 MG-MCG per tablet; Take 1 tablet by mouth daily  Dispense: 84 tablet; Refill: 1    RTO 3 months for pill check       Subjective:      Patient ID: Vanessa Jackson is a 37 y o  female  No significant medical history  [id number]  Just moved from [place]  No insurance here yet  Was on [employer] in 2019  Looking for birth control  Review of Systems   Constitutional: Negative for activity change, appetite change, chills and fever  Respiratory: Negative for shortness of breath  Cardiovascular: Negative for chest pain  Gastrointestinal: Negative for blood in stool, nausea and vomiting  Genitourinary: Negative for difficulty urinating and dysuria  Psychiatric/Behavioral: Negative for behavioral problems  Objective:  /84   Pulse 59   Ht 5' (1 524 m)   Wt 69 6 kg (153 lb 6 4 oz)   LMP 03/11/2021 (Exact Date)   BMI 29 96 kg/m²     Vitals signs and nursing note reviewed  Constitutional:       General: She is not in acute distress  Appearance: She is well-developed  HENT:      Head: Normocephalic and atraumatic  Cardiovascular:      Rate and Rhythm: Normal rate and regular rhythm  Heart sounds: Normal heart sounds  No murmur  Pulmonary:      Effort: Pulmonary effort is normal  No respiratory distress  Breath sounds: Normal breath sounds  No wheezing  Neurological:      Mental Status: She is alert and oriented to person, place, and time     Psychiatric:         Behavior: Behavior normal

## 2021-04-23 ENCOUNTER — TELEPHONE (OUTPATIENT)
Dept: OBGYN CLINIC | Facility: CLINIC | Age: 36
End: 2021-04-23

## 2021-04-23 NOTE — TELEPHONE ENCOUNTER
Patient is about 6wks pregnant and wants to know if she can come in sooner since she has had miscarriage previously and she is experiencing pain like if she is about to get her period and wants to be seen as soon as possible

## 2021-04-28 ENCOUNTER — TELEPHONE (OUTPATIENT)
Dept: OBGYN CLINIC | Facility: CLINIC | Age: 36
End: 2021-04-28

## 2021-04-28 NOTE — TELEPHONE ENCOUNTER
Received message from front staff to call patient, left message via voicemail asking patient to call office

## 2021-04-29 ENCOUNTER — TELEPHONE (OUTPATIENT)
Dept: OBGYN CLINIC | Facility: CLINIC | Age: 36
End: 2021-04-29

## 2021-05-04 ENCOUNTER — OFFICE VISIT (OUTPATIENT)
Dept: OBGYN CLINIC | Facility: CLINIC | Age: 36
End: 2021-05-04

## 2021-05-04 VITALS
BODY MASS INDEX: 30.23 KG/M2 | HEIGHT: 60 IN | WEIGHT: 154 LBS | SYSTOLIC BLOOD PRESSURE: 129 MMHG | HEART RATE: 77 BPM | DIASTOLIC BLOOD PRESSURE: 79 MMHG

## 2021-05-04 DIAGNOSIS — O03.9 SAB (SPONTANEOUS ABORTION): ICD-10-CM

## 2021-05-04 DIAGNOSIS — Z30.016 ENCOUNTER FOR INITIAL PRESCRIPTION OF TRANSDERMAL PATCH HORMONAL CONTRACEPTIVE DEVICE: ICD-10-CM

## 2021-05-04 DIAGNOSIS — Z31.9 PATIENT DESIRES PREGNANCY: Primary | ICD-10-CM

## 2021-05-04 PROCEDURE — 99213 OFFICE O/P EST LOW 20 MIN: CPT | Performed by: OBSTETRICS & GYNECOLOGY

## 2021-05-04 RX ORDER — PNV NO.95/FERROUS FUM/FOLIC AC 28MG-0.8MG
1 TABLET ORAL DAILY
Qty: 90 TABLET | Refills: 3 | Status: SHIPPED | OUTPATIENT
Start: 2021-05-04

## 2021-05-04 NOTE — PROGRESS NOTES
Assessment/Plan:     No problem-specific Assessment & Plan notes found for this encounter  Diagnoses and all orders for this visit:    Patient desires pregnancy  -     Prenatal Vit-Fe Fumarate-FA (Prenatal Vitamin) 27-0 8 MG TABS; Take 1 tablet by mouth daily    SAB (spontaneous )    Encounter for initial prescription of transdermal patch hormonal contraceptive device  -     norelgestromin-ethinyl estradiol (ORTHO EVRA) 150-35 MCG/24HR; Place 1 patch on the skin once a week      Will initiate patch  Plan on pregnancy again soon  Continue PNVs           Subjective:      Patient ID: Meri Walsh is a 31 y o  female who presents as a follow up to [facility]  This is the second one she had  This is a different partner than the father of her child, thought he has fathered a child  She does desires pregnancy in the near future but would like birth control for now  She is only spotting  Her blood type is O positive  HPI    The following portions of the patient's history were reviewed and updated as appropriate: allergies, current medications, past family history, past medical history, past social history, past surgical history and problem list     Review of Systems      Objective:      /79   Pulse 77   Ht 5' (1 524 m)   Wt 69 9 kg (154 lb)   BMI 30 08 kg/m²          Physical Exam  Vitals signs and nursing note reviewed  Constitutional:       Appearance: Normal appearance  Pulmonary:      Effort: Pulmonary effort is normal    Neurological:      General: No focal deficit present  Mental Status: She is alert and oriented to person, place, and time     Psychiatric:         Mood and Affect: Mood normal          Behavior: Behavior normal

## 2021-09-21 ENCOUNTER — TELEPHONE (OUTPATIENT)
Dept: OBGYN CLINIC | Facility: CLINIC | Age: 36
End: 2021-09-21

## 2021-10-27 ENCOUNTER — OFFICE VISIT (OUTPATIENT)
Dept: OBGYN CLINIC | Facility: CLINIC | Age: 36
End: 2021-10-27

## 2021-10-27 VITALS
BODY MASS INDEX: 29.1 KG/M2 | DIASTOLIC BLOOD PRESSURE: 94 MMHG | WEIGHT: 149 LBS | SYSTOLIC BLOOD PRESSURE: 132 MMHG | HEART RATE: 72 BPM

## 2021-10-27 DIAGNOSIS — R10.2 PELVIC PAIN: Primary | ICD-10-CM

## 2021-10-27 PROCEDURE — 99213 OFFICE O/P EST LOW 20 MIN: CPT | Performed by: OBSTETRICS & GYNECOLOGY

## 2021-10-27 PROCEDURE — 87491 CHLMYD TRACH DNA AMP PROBE: CPT | Performed by: OBSTETRICS & GYNECOLOGY

## 2021-10-27 PROCEDURE — 87591 N.GONORRHOEAE DNA AMP PROB: CPT | Performed by: OBSTETRICS & GYNECOLOGY

## 2021-10-29 LAB
C TRACH DNA SPEC QL NAA+PROBE: NEGATIVE
N GONORRHOEA DNA SPEC QL NAA+PROBE: NEGATIVE

## 2021-11-05 ENCOUNTER — HOSPITAL ENCOUNTER (OUTPATIENT)
Dept: ULTRASOUND IMAGING | Facility: HOSPITAL | Age: 36
Discharge: HOME | End: 2021-11-05
Attending: OBSTETRICS & GYNECOLOGY

## 2021-11-05 DIAGNOSIS — R10.2 PELVIC PAIN: ICD-10-CM

## 2021-11-05 PROCEDURE — 76856 US EXAM PELVIC COMPLETE: CPT

## 2021-11-05 PROCEDURE — 76830 TRANSVAGINAL US NON-OB: CPT

## 2021-11-10 ENCOUNTER — OFFICE VISIT (OUTPATIENT)
Dept: OBGYN CLINIC | Facility: CLINIC | Age: 36
End: 2021-11-10

## 2021-11-10 VITALS
BODY MASS INDEX: 29.88 KG/M2 | SYSTOLIC BLOOD PRESSURE: 139 MMHG | HEART RATE: 73 BPM | DIASTOLIC BLOOD PRESSURE: 95 MMHG | WEIGHT: 153 LBS

## 2021-11-10 DIAGNOSIS — R10.32 LEFT LOWER QUADRANT ABDOMINAL PAIN: Primary | ICD-10-CM

## 2021-11-10 PROCEDURE — 99213 OFFICE O/P EST LOW 20 MIN: CPT | Performed by: OBSTETRICS & GYNECOLOGY

## 2022-02-15 ENCOUNTER — TELEPHONE (OUTPATIENT)
Dept: OBGYN CLINIC | Facility: CLINIC | Age: 37
End: 2022-02-15

## 2022-06-16 ENCOUNTER — TELEPHONE (OUTPATIENT)
Dept: OBGYN CLINIC | Facility: CLINIC | Age: 37
End: 2022-06-16

## 2022-06-16 NOTE — TELEPHONE ENCOUNTER
Patient has an insurance that is not accepted by the practice   Will reach out to the financial team to inquire about assisting patient to apply and obtain insurance that is accepted in the practice and will set up appointment once we hear back from financial team

## 2022-06-21 ENCOUNTER — TELEPHONE (OUTPATIENT)
Dept: OBGYN CLINIC | Facility: CLINIC | Age: 37
End: 2022-06-21

## 2022-06-21 ENCOUNTER — OFFICE VISIT (OUTPATIENT)
Dept: OBGYN CLINIC | Facility: CLINIC | Age: 37
End: 2022-06-21

## 2022-06-21 VITALS — WEIGHT: 149 LBS | DIASTOLIC BLOOD PRESSURE: 70 MMHG | BODY MASS INDEX: 29.1 KG/M2 | SYSTOLIC BLOOD PRESSURE: 120 MMHG

## 2022-06-21 DIAGNOSIS — Z36.87 UNSURE OF LMP (LAST MENSTRUAL PERIOD) AS REASON FOR ULTRASOUND SCAN: ICD-10-CM

## 2022-06-21 DIAGNOSIS — N91.2 AMENORRHEA: Primary | ICD-10-CM

## 2022-06-21 DIAGNOSIS — O26.851 SPOTTING AFFECTING PREGNANCY IN FIRST TRIMESTER: ICD-10-CM

## 2022-06-21 PROCEDURE — 99213 OFFICE O/P EST LOW 20 MIN: CPT | Performed by: OBSTETRICS & GYNECOLOGY

## 2022-06-21 NOTE — TELEPHONE ENCOUNTER
Patient should be scheduled for office visit for further evaluation, per protocol       Nell Olguin MD  6/21/2022 8:34 AM

## 2022-06-21 NOTE — TELEPHONE ENCOUNTER
Aurelia is having dark, red vaginal bleeding this morning with minor menstrual type cramping pains  LMP 05/09/22 (6 1 wks) She did have coitus on Saturday  She is concerned she is having a Miscarriage, had  Two miscarriages in the past   Tried to inform her that bleeding maybe from recent coitus and needs to increase fluid intake  Informed if is having a miscarriage,there's nothing can be done to stop it at this stage of pregnancy  Aurelia's  bldtype: O(+)  She'll be self pay, as attempting to get health insurance  Will send message to Dr Nicolette Cruz for recommendations

## 2022-06-21 NOTE — TELEPHONE ENCOUNTER
Spoke with [facility] about scheduling an appt    Informed her have a 5:40 pm appt with Dr Marnie Acosta  Provided office address to [facility]

## 2022-07-07 ENCOUNTER — INITIAL PRENATAL (OUTPATIENT)
Dept: OBGYN CLINIC | Facility: CLINIC | Age: 37
End: 2022-07-07

## 2022-07-07 VITALS
HEIGHT: 60 IN | DIASTOLIC BLOOD PRESSURE: 72 MMHG | SYSTOLIC BLOOD PRESSURE: 126 MMHG | WEIGHT: 148.6 LBS | BODY MASS INDEX: 29.17 KG/M2

## 2022-07-07 DIAGNOSIS — Z98.891 HISTORY OF CESAREAN DELIVERY: ICD-10-CM

## 2022-07-07 DIAGNOSIS — Z34.91 PRENATAL CARE IN FIRST TRIMESTER: ICD-10-CM

## 2022-07-07 DIAGNOSIS — Z34.81 PRENATAL CARE, SUBSEQUENT PREGNANCY, FIRST TRIMESTER: Primary | ICD-10-CM

## 2022-07-07 DIAGNOSIS — O26.851 SPOTTING AFFECTING PREGNANCY IN FIRST TRIMESTER: ICD-10-CM

## 2022-07-07 DIAGNOSIS — O09.521 MULTIGRAVIDA OF ADVANCED MATERNAL AGE IN FIRST TRIMESTER: Primary | ICD-10-CM

## 2022-07-07 DIAGNOSIS — B37.31 VAGINAL YEAST INFECTION: ICD-10-CM

## 2022-07-07 DIAGNOSIS — Z12.4 SCREENING FOR CERVICAL CANCER: ICD-10-CM

## 2022-07-07 PROCEDURE — NOBC: Performed by: OBSTETRICS & GYNECOLOGY

## 2022-07-07 PROCEDURE — PNV: Performed by: OBSTETRICS & GYNECOLOGY

## 2022-07-07 PROCEDURE — 76817 TRANSVAGINAL US OBSTETRIC: CPT | Performed by: OBSTETRICS & GYNECOLOGY

## 2022-07-07 NOTE — ASSESSMENT & PLAN NOTE
TV U/S shows an IUP with a CRL of 5 6 mm which is consistent with 6w 2d and correlates to her LMP of 5/9/22  FHT are noted and appropriate  There is an anterior 3 2cm myoma, which is primarily intramural but there may be a small submucosal component   Schedule first PN in 2 -3 weeks

## 2022-07-07 NOTE — PROGRESS NOTES
[address] [facility], [address], [facility], [address]    Assessment/Plan:  1  Unsure of LMP (last menstrual period) as reason for ultrasound scan    2  Spotting affecting pregnancy in first trimester  Assessment & Plan:  TV U/S shows an IUP with a CRL of 5 6 mm which is consistent with 6w 2d and correlates to her LMP of 22  FHT are noted and appropriate  There is an anterior 3 2cm myoma, which is primarily intramural but there may be a small submucosal component  Schedule first PN in 2 -3 weeks        Subjective:   Aurelia Walsh is a 30 y o  [id number]   CC:   Chief Complaint   Patient presents with    Pregnancy Problem     LMP 2022  , +HPT 2022       HPI: see above    ROS: Negative except as noted in HPI    Patient's last menstrual period was 2022 (exact date)  She  reports being sexually active and has had partner(s) who are male         The following portions of the patient's history were reviewed and updated as appropriate:   Past Medical History:   Diagnosis Date    Cholelithiasis      Past Surgical History:   Procedure Laterality Date     SECTION          CHOLECYSTECTOMY LAPAROSCOPIC N/A 2019    Procedure: CHOLECYSTECTOMY LAPAROSCOPIC;  Surgeon: Bridget Carmichael MD;  Location: [address] [place] [address];  Service: General     Family History   Problem Relation Age of Onset    Liver cancer Mother     Arthritis Father      Social History     Socioeconomic History    Marital status: Single     Spouse name: None    Number of children: None    Years of education: None    Highest education level: None   Occupational History    None   Tobacco Use    Smoking status: Never Smoker    Smokeless tobacco: Never Used   Vaping Use    Vaping Use: Never used   Substance and Sexual Activity    Alcohol use: Not Currently     Comment: socially    Drug use: Not Currently    Sexual activity: Yes     Partners: Male     Comment: no new partner in past year   Other Topics Concern    None   Social History Narrative    ** Merged History Encounter **          Social Determinants of Health     Financial Resource Strain: Low Risk     Difficulty of Paying Living Expenses: Not hard at all   Food Insecurity: No Food Insecurity    Worried About Running Out of Food in the Last Year: Never true    Jolie of Food in the Last Year: Never true   Transportation Needs: No Transportation Needs    Lack of Transportation (Medical): No    Lack of Transportation (Non-Medical): No   Physical Activity: Not on file   Stress: Not on file   Social Connections: Not on file   Intimate Partner Violence: Not on file   Housing Stability: Low Risk     Unable to Pay for Housing in the Last Year: No    Number of Places Lived in the Last Year: 1    Unstable Housing in the Last Year: No     Outpatient Medications Marked as Taking for the 6/21/22 encounter (Office Visit) with Patricia Figueroa MD   Medication    acetaminophen (TYLENOL) 500 mg tablet    Prenatal Vit-Fe Fumarate-FA (Prenatal Vitamin) 27-0 8 MG TABS     No Known Allergies        Objective:  /70 (BP Location: Right arm, Patient Position: Sitting, Cuff Size: Standard)   Wt 67 6 kg (149 lb)   LMP 05/09/2022 (Exact Date)   Breastfeeding No   BMI 29 10 kg/m²        Chaperone present? Yes:      General Appearance: alert and oriented, in no acute distress  Abdomen: Soft, non-tender, non-distended, no masses, no rebound or guarding  Pelvic:       External genitalia: Normal appearance, no abnormal pigmentation, no lesions or masses  Normal Bartholin's and [place]'s  Urinary system: Urethral meatus normal, bladder non-tender  Vaginal: normal mucosa without prolapse or lesions  Normal-appearing physiologic discharge  Scant blood in vault      Cervix: Normal-appearing, well-epithelialized, no gross lesions or masses  No cervical motion tenderness  Adnexa: No adnexal masses or tenderness noted        Uterus: Normal-sized, regular contour, midline, mobile, no uterine tenderness   Extremities: Normal range of motion     Skin: normal, no rash or abnormalities  Neurologic: alert, oriented x3  Psychiatric: Appropriate affect, mood stable, cooperative with exam         Delvin Escobar MD

## 2022-07-07 NOTE — PROGRESS NOTES
OB INTAKE INTERVIEW  Patient is 30 y o  who presents for OB intake at 8 3 wks        Last Menstrual Period: 2022  Ultrasound: Measured 9 weeks 1 days on 2022  Estimated Date of Delivery: 2023 confirmed by 8 week US    Signs/Symptoms of Pregnancy  Current pregnancy symptoms: n/v, fatigue, cramping  Constipation no  Headaches no  Cramping/spotting YES- cramping Spotting stopped after visit on 2022  PICA cravings no    Diabetes-  Body mass index is 29 02 kg/m²  If patient has 1 or more, please order early 1 hour GTT  History of GDM no  BMI >35 no  History of PCOS or current metformin use no  History of LGA/macrosomic infant (4000g/9lbs) no    If patient has 2 or more, please order early 1 hour GTT  BMI>30 no  AMA no  First degree relative with type 2 diabetes no  History of chronic HTN, hyperlipidemia, elevated A1C no  High risk race (, , ,  or ) no    Hypertension- if you answer yes, please order preeclampsia labs (cbc, comprehensive metabolic panel, urine protein creatinine ratio, uric acid)  History of of chronic HTN no  History of gestational HTN no  History of preeclampsia, eclampsia, or HELLP syndrome no  History of diabetes no  History of lupus, autoimmune disease, kidney disease no    Thyroid- if yes order TSH with reflex T4  History of thyroid disease no    Bleeding Disorder or Hx of DVT-patient or first degree relative with history of  Order the following if not done previously     (Factor V, antithrombin III, prothrombin gene mutation, protein C and S Ag, lupus anticoagulant, anticardiolipin, beta-2 glycoprotein)   no    OB/GYN-  History of abnormal pap smear YES  History of HPV YES  History of Herpes/HSV no  History of other STI (gonorrhea, chlamydia, trich) no  History of prior  no  History of prior  YES  History of  delivery prior to 36 weeks 6 days no  History of blood transfusion no  Ok for blood transfusion yes    Substance screening- if yes outside of tobacco for her or anyone in her home-order urine drug screen  History of tobacco use no  Currently using tobacco no  Currently using alcohol no  Presently using drugs no  Past drug use  no  IV drug use-If yes add Hep C antibody to labs no  Partner drug use no  Parent/Family drug use no    MRSA Screening-   Does the pt have a hx of MRSA? no  If yes- please follow MRSA protocol and obtain a nasal swab for MRSA culture    Immunizations:  Influenza vaccine given this season yes  Discussed Tdap vaccine yes  Discussed COVID Vaccine yes    Genetic/MFM-  Do you or your partner have a history of any of the following in yourselves or first degree relatives? Cystic fibrosis no  Spinal muscular atrophy no  Hemoglobinopathy/Sickle Cell/Thalassemia no  Fragile X Intellectual Disability no    If yes, discuss carrier screening and recommend consultation with [facility]/genetic counseling  If no, discuss option for carrier screening and/or genetic testing with [facility]  Patient interested yes  Appointment at [facility] made referral given    Interview education  [facility] Pregnancy Essentials Book reviewed and discussed yes      Prenatal lab work scripts yes  Extra labs ordered: The patient has a history now or in prior pregnancy notable for:  none      Details that I feel the provider should be aware of: Previous  in [facility] in 2015; HPV at some point a biopsy was done per pt; 2 miscarriages    PN1 visit scheduled  The patient was oriented to our practice, reviewed delivering physicians and [address] [place] [address] for Delivery  All questions were answered  Interviewed by: Colette Webb RN      Pt left without providing urine sample

## 2022-07-07 NOTE — PROGRESS NOTES
First OB Visit  [address] [facility], [address], [facility] [address] [facility]    Assessment/Plan:  Terrance Ortega is a 30y o  year old  at [facility] who presents for initial prenatal visit  Final SAMMIE: 2023, by Last Menstrual Period      Supervision of normal pregnancy  - Aneuploidy screening discussed  Patient opts for cell free DNA testing   - Routine cervical cancer screening: Pap Done today  - Routine STI Screening: GC/Chlamydia sent today  HIV/Hep B/Hep C/Syphilis ordered in prenatal panel   - Patient Education: Patient was counseled regarding diet, exercise, weight gain, foods to avoid, vaccines in pregnancy, aneuploidy screening, travel precautions to include seat belt use and VTE risk reduction  She has been provided our pregnancy packet which includes how and when to contact providers, medication recommendations, dietary suggestions, breastfeeding information as well as websites for additional information, hospital and delivery concerns  Additional Pregnancy Problems:   1  Multigravida of advanced maternal age in first trimester  -     AMB  OB < 14 weeks single or first gestation level 1    2  History of  delivery  Comments:  Previous C/s in [facility] for CPD vs LGA, pt uncertain  No records available  Desires to scheduled repeat C/S  Orders:  -     AMB  OB < 14 weeks single or first gestation level 1    3  Screening for cervical cancer  -     Thinprep Tis Pap, HPV mRNA E6/E7 Rfx HPV 16,18/45, Chlamydia/N gonorrhoeae    4  Spotting affecting pregnancy in first trimester  Comments:  no vaginal spotting since 24    5  Prenatal care in first trimester    6  Vaginal yeast infection      -  Monistat 7 days, OTC recommended    MFM referral given for early anatomy and aneuploidy screening, due 11-13 weeks  Next OB visit: 4 wks    Subjective:   CC:  Desires prenatal care  Aurelia Reddy is a 30 y o   female who presents for prenatal care    Patient's last menstrual period was 2022 (exact date)  Which would make her 8 weeks and 3 days pregnant today  Pregnancy ROS: no leakage of fluid, pelvic pain, or vaginal bleeding  Has nausea/vomiting  OB History    Para Term  AB Living   4 1 1 0 2 1   SAB IAB Ectopic Multiple Live Births   2 0 0   1      # Outcome Date GA Lbr Sameer/2nd Weight Sex Delivery Anes PTL Lv   4 Current            3 SAB            2 SAB            1 Term                The following portions of the patient's history were reviewed and updated as appropriate: She  has a past medical history of Cholelithiasis  She  has a past surgical history that includes  section and CHOLECYSTECTOMY LAPAROSCOPIC (N/A, 2019)  Her family history includes Arthritis in her father; Liver cancer in her mother  She  reports that she has never smoked  She has never used smokeless tobacco  She reports previous alcohol use  She reports previous drug use  Current Outpatient Medications   Medication Sig Dispense Refill    acetaminophen (TYLENOL) 500 mg tablet Take 500 mg by mouth every 6 (six) hours as needed for mild pain      naproxen (EC NAPROSYN) 500 MG EC tablet Take 1 tablet (500 mg total) by mouth 2 (two) times a day as needed for mild pain for up to 7 days 14 tablet 0    norelgestromin-ethinyl estradiol (ORTHO EVRA) 150-35 MCG/24HR Place 1 patch on the skin once a week (Patient not taking: No sig reported) 3 patch 3    Prenatal Vit-Fe Fumarate-FA (Prenatal Vitamin) 27-0 8 MG TABS Take 1 tablet by mouth daily 90 tablet 3     No current facility-administered medications for this visit  She has No Known Allergies         Objective:  LMP 2022 (Exact Date)   Pregravid Weight/BMI: 67 6 kg (149 lb) (BMI 29 10)  Current Weight:     Total Weight Gain: -0 181 kg (-6 4 oz)   Pre- Vitals    Flowsheet Row Most Recent Value   Prenatal Assessment    Fetal Heart Rate 150-160   Prenatal Vitals    Urine Albumin/Glucose Dilation/Effacement/Station    Cervical Dilation 0   Cervical Effacement 0   Vaginal Drainage    Draining Fluid No   Edema          General: Well appearing, no distress  Breasts: Normal bilaterally, nontender without masses, asymmetry, or nipple discharge  Abdomen: Soft, gravid, nontender  : Urethra normal  Normal labia majora and minora  Vagina normal   No vaginal bleeding  No vaginal discharge  Cervix closed  Uterus non-tender  Extremities: Warm and well perfused  Non tender  No edema      Ultrasound: ultrasound confirmed SLIUP, see report for details

## 2022-07-09 LAB
C TRACH RRNA SPEC QL NAA+PROBE: NOT DETECTED
CLINICAL INFO: ABNORMAL
CYTO CVX: ABNORMAL
CYTOLOGY CMNT CVX/VAG CYTO-IMP: ABNORMAL
DATE PREVIOUS BX: ABNORMAL
HPV E6+E7 MRNA CVX QL NAA+PROBE: NOT DETECTED
LMP START DATE: ABNORMAL
MICROORGANISM CVX/VAG CYTO: ABNORMAL
N GONORRHOEA RRNA SPEC QL NAA+PROBE: NOT DETECTED
SL AMB PREV. PAP:: ABNORMAL
SPECIMEN SOURCE CVX/VAG CYTO: ABNORMAL

## 2022-07-10 ENCOUNTER — APPOINTMENT (EMERGENCY)
Dept: ULTRASOUND IMAGING | Facility: HOSPITAL | Age: 37
End: 2022-07-10

## 2022-07-10 ENCOUNTER — HOSPITAL ENCOUNTER (EMERGENCY)
Facility: HOSPITAL | Age: 37
Discharge: HOME | End: 2022-07-10
Attending: EMERGENCY MEDICINE | Admitting: EMERGENCY MEDICINE

## 2022-07-10 VITALS
SYSTOLIC BLOOD PRESSURE: 122 MMHG | BODY MASS INDEX: 26.22 KG/M2 | DIASTOLIC BLOOD PRESSURE: 82 MMHG | HEIGHT: 63 IN | RESPIRATION RATE: 18 BRPM | OXYGEN SATURATION: 99 % | TEMPERATURE: 98.3 F | WEIGHT: 148 LBS | HEART RATE: 74 BPM

## 2022-07-10 DIAGNOSIS — R10.9 ABDOMINAL PAIN DURING PREGNANCY: Primary | ICD-10-CM

## 2022-07-10 DIAGNOSIS — E87.6 HYPOKALEMIA: ICD-10-CM

## 2022-07-10 DIAGNOSIS — B37.31 VAGINAL YEAST INFECTION: ICD-10-CM

## 2022-07-10 DIAGNOSIS — O26.899 ABDOMINAL PAIN DURING PREGNANCY: Primary | ICD-10-CM

## 2022-07-10 LAB
ALBUMIN SERPL BCP-MCNC: 3.6 G/DL (ref 3.5–5)
ALP SERPL-CCNC: 70 U/L (ref 46–116)
ALT SERPL W P-5'-P-CCNC: 23 U/L (ref 12–78)
ANION GAP SERPL CALCULATED.3IONS-SCNC: 7 MMOL/L (ref 4–13)
AST SERPL W P-5'-P-CCNC: 11 U/L (ref 5–45)
B-HCG SERPL-ACNC: ABNORMAL MIU/ML
BACTERIA UR QL AUTO: ABNORMAL /HPF
BASOPHILS # BLD AUTO: 0.08 THOUSANDS/ΜL (ref 0–0.1)
BASOPHILS NFR BLD AUTO: 1 % (ref 0–1)
BILIRUB SERPL-MCNC: 0.4 MG/DL (ref 0.2–1)
BILIRUB UR QL STRIP: NEGATIVE
BUN SERPL-MCNC: 10 MG/DL (ref 5–25)
CALCIUM SERPL-MCNC: 8.8 MG/DL (ref 8.3–10.1)
CHLORIDE SERPL-SCNC: 101 MMOL/L (ref 100–108)
CLARITY UR: CLEAR
CO2 SERPL-SCNC: 29 MMOL/L (ref 21–32)
COLOR UR: YELLOW
CREAT SERPL-MCNC: 0.92 MG/DL (ref 0.6–1.3)
EOSINOPHIL # BLD AUTO: 0.19 THOUSAND/ΜL (ref 0–0.61)
EOSINOPHIL NFR BLD AUTO: 2 % (ref 0–6)
ERYTHROCYTE [DISTWIDTH] IN BLOOD BY AUTOMATED COUNT: 11.6 % (ref 11.6–15.1)
GFR SERPL CREATININE-BSD FRML MDRD: 80 ML/MIN/1.73SQ M
GLUCOSE SERPL-MCNC: 112 MG/DL (ref 65–140)
GLUCOSE UR STRIP-MCNC: NEGATIVE MG/DL
HCT VFR BLD AUTO: 37.8 % (ref 34.8–46.1)
HGB BLD-MCNC: 13.1 G/DL (ref 11.5–15.4)
HGB UR QL STRIP.AUTO: ABNORMAL
IMM GRANULOCYTES # BLD AUTO: 0.04 THOUSAND/UL (ref 0–0.2)
IMM GRANULOCYTES NFR BLD AUTO: 0 % (ref 0–2)
KETONES UR STRIP-MCNC: NEGATIVE MG/DL
LEUKOCYTE ESTERASE UR QL STRIP: NEGATIVE
LYMPHOCYTES # BLD AUTO: 2.59 THOUSANDS/ΜL (ref 0.6–4.47)
LYMPHOCYTES NFR BLD AUTO: 25 % (ref 14–44)
MCH RBC QN AUTO: 32 PG (ref 26.8–34.3)
MCHC RBC AUTO-ENTMCNC: 34.7 G/DL (ref 31.4–37.4)
MCV RBC AUTO: 92 FL (ref 82–98)
MONOCYTES # BLD AUTO: 0.52 THOUSAND/ΜL (ref 0.17–1.22)
MONOCYTES NFR BLD AUTO: 5 % (ref 4–12)
NEUTROPHILS # BLD AUTO: 6.91 THOUSANDS/ΜL (ref 1.85–7.62)
NEUTS SEG NFR BLD AUTO: 67 % (ref 43–75)
NITRITE UR QL STRIP: NEGATIVE
NON-SQ EPI CELLS URNS QL MICRO: ABNORMAL /HPF
NRBC BLD AUTO-RTO: 0 /100 WBCS
OTHER STN SPEC: ABNORMAL
PH UR STRIP.AUTO: 7 [PH]
PLATELET # BLD AUTO: 376 THOUSANDS/UL (ref 149–390)
PMV BLD AUTO: 10.2 FL (ref 8.9–12.7)
POTASSIUM SERPL-SCNC: 3.1 MMOL/L (ref 3.5–5.3)
PROT SERPL-MCNC: 7.2 G/DL (ref 6.4–8.2)
PROT UR STRIP-MCNC: NEGATIVE MG/DL
RBC # BLD AUTO: 4.09 MILLION/UL (ref 3.81–5.12)
RBC #/AREA URNS AUTO: ABNORMAL /HPF
SODIUM SERPL-SCNC: 137 MMOL/L (ref 136–145)
SP GR UR STRIP.AUTO: 1.01 (ref 1–1.03)
UROBILINOGEN UR QL STRIP.AUTO: 0.2 E.U./DL
WBC # BLD AUTO: 10.33 THOUSAND/UL (ref 4.31–10.16)
WBC #/AREA URNS AUTO: ABNORMAL /HPF

## 2022-07-10 PROCEDURE — 87086 URINE CULTURE/COLONY COUNT: CPT | Performed by: PHYSICIAN ASSISTANT

## 2022-07-10 PROCEDURE — 80053 COMPREHEN METABOLIC PANEL: CPT | Performed by: PHYSICIAN ASSISTANT

## 2022-07-10 PROCEDURE — 81001 URINALYSIS AUTO W/SCOPE: CPT | Performed by: PHYSICIAN ASSISTANT

## 2022-07-10 PROCEDURE — 99284 EMERGENCY DEPT VISIT MOD MDM: CPT | Performed by: PHYSICIAN ASSISTANT

## 2022-07-10 PROCEDURE — 99284 EMERGENCY DEPT VISIT MOD MDM: CPT

## 2022-07-10 PROCEDURE — 76801 OB US < 14 WKS SINGLE FETUS: CPT

## 2022-07-10 PROCEDURE — 85025 COMPLETE CBC W/AUTO DIFF WBC: CPT | Performed by: PHYSICIAN ASSISTANT

## 2022-07-10 PROCEDURE — 96360 HYDRATION IV INFUSION INIT: CPT

## 2022-07-10 PROCEDURE — 36415 COLL VENOUS BLD VENIPUNCTURE: CPT | Performed by: PHYSICIAN ASSISTANT

## 2022-07-10 PROCEDURE — 84702 CHORIONIC GONADOTROPIN TEST: CPT | Performed by: PHYSICIAN ASSISTANT

## 2022-07-10 RX ORDER — POTASSIUM CHLORIDE 20 MEQ/1
40 TABLET, EXTENDED RELEASE ORAL ONCE
Status: COMPLETED | OUTPATIENT
Start: 2022-07-10 | End: 2022-07-10

## 2022-07-10 RX ADMIN — SODIUM CHLORIDE 1000 ML: 0.9 INJECTION, SOLUTION INTRAVENOUS at 14:57

## 2022-07-10 RX ADMIN — POTASSIUM CHLORIDE 40 MEQ: 1500 TABLET, EXTENDED RELEASE ORAL at 17:20

## 2022-07-10 NOTE — DISCHARGE INSTRUCTIONS
Rest, increase fluids  Follow up with ob/gyn for recheck  Return to ER if symptoms worsen  Start monistat tonight

## 2022-07-10 NOTE — ED PROVIDER NOTES
History  Chief Complaint   Patient presents with    Abdominal Pain Pregnant     9 weeks pregnant, last night started with b/l lower abdominal cramping  Did not call OBGYN  Leonor Willingham      Patient is a 29 y/o F  currently 9 weeks pregnant presents to the ED with lower abdominal cramping  SHe denies vaginal bleeding  According to records patient was diagnosed with yeast infection on , but states she did not go to the pharmacy to get monistat yet  She denies fevers, chills  No dysuria or urgency  She did not call her OB/GYN  No abnormal vaginal discharge  Blood type is O+ according to prior labs  History provided by:  Patient  Pregnancy Problem  Quality: abdominal cramping  Severity:  Mild  Onset quality:  Sudden  Duration:  2 days  Timing:  Constant  Progression:  Unchanged  Chronicity:  New  Pregnancy confirmed by ultrasound: yes    Gestational age:  9 weeks  Prenatal care:  Regular prenatal care  Relieved by:  Nothing  Worsened by:  Nothing  Ineffective treatments:  None tried  Associated symptoms: abdominal pain    Associated symptoms: no back pain, no dizziness, no dysuria, no fatigue, no fever, no nausea and no vaginal discharge    Risk factors: prior miscarriage (2 prior to this pregnancy  )        Prior to Admission Medications   Prescriptions Last Dose Informant Patient Reported? Taking?    Prenatal Vit-Fe Fumarate-FA (Prenatal Vitamin) 27-0 8 MG TABS   No No   Sig: Take 1 tablet by mouth daily   acetaminophen (TYLENOL) 500 mg tablet  Spouse/Significant Other Yes No   Sig: Take 500 mg by mouth every 6 (six) hours as needed for mild pain   naproxen (EC NAPROSYN) 500 MG EC tablet   No No   Sig: Take 1 tablet (500 mg total) by mouth 2 (two) times a day as needed for mild pain for up to 7 days   norelgestromin-ethinyl estradiol (ORTHO EVRA) 150-35 MCG/24HR   No No   Sig: Place 1 patch on the skin once a week   Patient not taking: No sig reported      Facility-Administered Medications: None Past Medical History:   Diagnosis Date    Cholelithiasis        Past Surgical History:   Procedure Laterality Date     SECTION          CHOLECYSTECTOMY LAPAROSCOPIC N/A 2019    Procedure: CHOLECYSTECTOMY LAPAROSCOPIC;  Surgeon: Brittney Field MD;  Location: [address] [place] [address];  Service: General       Family History   Problem Relation Age of Onset    Liver cancer Mother     Arthritis Father      I have reviewed and agree with the history as documented  E-Cigarette/Vaping    E-Cigarette Use Never User      E-Cigarette/Vaping Substances    Nicotine No     THC No     CBD No     Flavoring No     Other No     Unknown No      Social History     Tobacco Use    Smoking status: Never Smoker    Smokeless tobacco: Never Used   Vaping Use    Vaping Use: Never used   Substance Use Topics    Alcohol use: Not Currently     Comment: socially    Drug use: Not Currently       Review of Systems   Constitutional: Negative for chills, fatigue and fever  HENT: Negative  Respiratory: Negative for cough and shortness of breath  Cardiovascular: Negative for chest pain and leg swelling  Gastrointestinal: Positive for abdominal pain  Negative for diarrhea, nausea and vomiting  Genitourinary: Negative for dysuria and vaginal discharge  Musculoskeletal: Negative for back pain and neck pain  Skin: Negative for color change, pallor and rash  Neurological: Negative for dizziness, weakness, light-headedness, numbness and headaches  Psychiatric/Behavioral: Negative for confusion  All other systems reviewed and are negative  Physical Exam  Physical Exam  Vitals and nursing note reviewed  Constitutional:       General: She is not in acute distress  Appearance: Normal appearance  She is well-developed, well-groomed and normal weight  She is not ill-appearing or diaphoretic  HENT:      Head: Normocephalic and atraumatic        Right Ear: Ear canal and external ear normal       Left Ear: Ear canal and external ear normal       Nose: Nose normal       Mouth/Throat:      Mouth: Mucous membranes are moist       Pharynx: Oropharynx is clear  Eyes:      Conjunctiva/sclera: Conjunctivae normal    Cardiovascular:      Rate and Rhythm: Normal rate and regular rhythm  Heart sounds: Normal heart sounds  Pulmonary:      Effort: Pulmonary effort is normal       Breath sounds: Normal breath sounds  No wheezing, rhonchi or rales  Abdominal:      General: Abdomen is flat  Bowel sounds are normal       Palpations: Abdomen is soft  Tenderness: There is abdominal tenderness in the suprapubic area  There is no right CVA tenderness, left CVA tenderness, guarding or rebound  Musculoskeletal:      Cervical back: Normal range of motion and neck supple  Right lower leg: No edema  Left lower leg: No edema  Skin:     General: Skin is warm and dry  Coloration: Skin is not pale  Findings: No erythema or rash  Neurological:      General: No focal deficit present  Mental Status: She is alert and oriented to person, place, and time  Motor: No weakness  Psychiatric:         Mood and Affect: Mood normal          Behavior: Behavior is cooperative           Vital Signs  ED Triage Vitals   Temperature Pulse Respirations Blood Pressure SpO2   07/10/22 1509 07/10/22 1438 07/10/22 1438 07/10/22 1438 07/10/22 1438   98 3 °F (36 8 °C) 82 16 151/92 100 %      Temp Source Heart Rate Source Patient Position - Orthostatic VS BP Location FiO2 (%)   07/10/22 1509 07/10/22 1438 07/10/22 1438 07/10/22 1438 --   Oral Monitor Lying Right arm       Pain Score       07/10/22 1438       8           Vitals:    07/10/22 1438 07/10/22 1500 07/10/22 1600   BP: 151/92 131/83 122/82   Pulse: 82 76 74   Patient Position - Orthostatic VS: Lying Lying Lying         Visual Acuity      ED Medications  Medications   potassium chloride (K-DUR,KLOR-CON) CR tablet 40 mEq (has no administration in time range) sodium chloride 0 9 % bolus 1,000 mL (1,000 mL Intravenous New Bag 7/10/22 1457)       Diagnostic Studies  Results Reviewed     Procedure Component Value Units Date/Time    Comprehensive metabolic panel [[id number]]  (Abnormal) Collected: 07/10/22 1453    Lab Status: Final result Specimen: Blood from Arm, Left Updated: 07/10/22 1637     Sodium 137 mmol/L      Potassium 3 1 mmol/L      Chloride 101 mmol/L      CO2 29 mmol/L      ANION GAP 7 mmol/L      BUN 10 mg/dL      Creatinine 0 92 mg/dL      Glucose 112 mg/dL      Calcium 8 8 mg/dL      AST 11 U/L      ALT 23 U/L      Alkaline Phosphatase 70 U/L      Total Protein 7 2 g/dL      Albumin 3 6 g/dL      Total Bilirubin 0 40 mg/dL      eGFR 80 ml/min/1 73sq m     Narrative:      National Kidney Disease Foundation guidelines for Chronic Kidney Disease (CKD):     Stage 1 with normal or high GFR (GFR > 90 mL/min/1 73 square meters)    Stage 2 Mild CKD (GFR = 60-89 mL/min/1 73 square meters)    Stage 3A Moderate CKD (GFR = 45-59 mL/min/1 73 square meters)    Stage 3B Moderate CKD (GFR = 30-44 mL/min/1 73 square meters)    Stage 4 Severe CKD (GFR = 15-29 mL/min/1 73 square meters)    Stage 5 End Stage CKD (GFR <15 mL/min/1 73 square meters)  Note: GFR calculation is accurate only with a steady state creatinine    Urine Microscopic [[id number]]  (Abnormal) Collected: 07/10/22 1457    Lab Status: Final result Specimen: Urine, Clean Catch Updated: 07/10/22 1541     RBC, UA 4-10 /hpf      WBC, UA None Seen /hpf      Epithelial Cells Occasional /hpf      Bacteria, UA None Seen /hpf      OTHER OBSERVATIONS Yeast Cells Present     URINE COMMENT --    UA w Reflex to Microscopic w Reflex to Culture [[id number]]  (Abnormal) Collected: 07/10/22 1457    Lab Status: Final result Specimen: Urine, Clean Catch Updated: 07/10/22 1527     Color, UA Yellow     Clarity, UA Clear     Specific Gravity, UA 1 010     pH, UA 7 0     Leukocytes, UA Negative     Nitrite, UA Negative Protein, UA Negative mg/dl      Glucose, UA Negative mg/dl      Ketones, UA Negative mg/dl      Urobilinogen, UA 0 2 E U /dl      Bilirubin, UA Negative     Occult Blood, UA Moderate     URINE COMMENT --    Urine culture [[id number]] Collected: 07/10/22 1457    Lab Status: In process Specimen: Urine, Clean Catch Updated: 07/10/22 1526    CBC and differential [[id number]]  (Abnormal) Collected: 07/10/22 1453    Lab Status: Final result Specimen: Blood from Arm, Left Updated: 07/10/22 1519     WBC 10 33 Thousand/uL      RBC 4 09 Million/uL      Hemoglobin 13 1 g/dL      Hematocrit 37 8 %      MCV 92 fL      MCH 32 0 pg      MCHC 34 7 g/dL      RDW 11 6 %      MPV 10 2 fL      Platelets 246 Thousands/uL      nRBC 0 /100 WBCs      Neutrophils Relative 67 %      Immat GRANS % 0 %      Lymphocytes Relative 25 %      Monocytes Relative 5 %      Eosinophils Relative 2 %      Basophils Relative 1 %      Neutrophils Absolute 6 91 Thousands/µL      Immature Grans Absolute 0 04 Thousand/uL      Lymphocytes Absolute 2 59 Thousands/µL      Monocytes Absolute 0 52 Thousand/µL      Eosinophils Absolute 0 19 Thousand/µL      Basophils Absolute 0 08 Thousands/µL     Quantitative hCG [[id number]] Collected: 07/10/22 1453    Lab Status: In process Specimen: Blood from Arm, Left Updated: 07/10/22 1514                 US OB < 14 weeks with transvaginal   Final Result by Sabina Bennett MD (07/10 1652)      Single live intrauterine gestation at 9 weeks 1 days (range +/- 6 days)  SAMMIE of 2/11/2023  Workstation performed: [id number]                    Procedures  Procedures         ED Course  ED Course as of 07/10/22 1715   Sun Jul 10, 2022   Scar Garcia from /S states she is currently with another patient and will be over to perform ultrasound a little later                                  SBIRT 20yo+    Flowsheet Row Most Recent Value   SBIRT (23 yo +)    In order to provide better care to our patients, we are screening all of our patients for alcohol and drug use  Would it be okay to ask you these screening questions? No Filed at: 07/10/2022 1442                    MDM  Number of Diagnoses or Management Options  Abdominal pain during pregnancy: new and requires workup  Hypokalemia: new and requires workup  Vaginal yeast infection: new and does not require workup  Diagnosis management comments: Patient with lower abdominal pain, about 9 weeks pregnant, no vaginal bleeding, will order u/s, labs to r/o miscarriage  U/s normal with fetal HR of 177, patient's blood type RH positive, no need for Rhogam    Patient was told by OB/GYN to start monistat, but didn't yet, I instructed her to start that tonight  Return precautions given  [facility] pending, specimen sent to [facility] since we are unable to run it here  Amount and/or Complexity of Data Reviewed  Clinical lab tests: ordered and reviewed  Tests in the radiology section of CPT®: ordered and reviewed    Patient Progress  Patient progress: stable      Disposition  Final diagnoses:   Abdominal pain during pregnancy   Hypokalemia   Vaginal yeast infection     Time reflects when diagnosis was documented in both MDM as applicable and the Disposition within this note     Time User Action Codes Description Comment    7/10/2022  5:03 PM Ranell Evelyn Add [S52 473,  R10 9] Abdominal pain during pregnancy     7/10/2022  5:03 PM Ranell Evelyn Add [E87 6] Hypokalemia     7/10/2022  5:07 PM Ranell Evelyn Add [B37 9] Yeast infection     7/10/2022  5:07 PM Ranell Evelyn Remove [B37 9] Yeast infection     7/10/2022  5:07 PM Ranell Eevlyn Add [B37 3] Vaginal yeast infection       ED Disposition     ED Disposition   Discharge    Condition   Stable    Date/Time   Sun Jul 10, 2022  5:03 PM    Comment   Aurelia Walsh discharge to home/self care                 Follow-up Information     Follow up With Specialties Details Why Contact Info Additional Information [address] [place] [address] Obstetrics and Gynecology Schedule an appointment as soon as possible for a visit  For recheck Pod [place] [address] [phone]  [facility], [address] [place] [address], [phone], [place]  [address]  Emergency Department Emergency Medicine Go to  If symptoms worsen [address] [place], [phone]  [address] [facility] ER Emergency Department, [address] [place] [address] Davis Jauregui Luige Gil 10          Patient's Medications   Discharge Prescriptions    No medications on file       No discharge procedures on file      PDMP Review     None          ED Provider  Electronically Signed by           Reid Fitzgerald PA-C  07/10/22 6135

## 2022-07-12 ENCOUNTER — ROUTINE PRENATAL (OUTPATIENT)
Dept: OBGYN CLINIC | Facility: CLINIC | Age: 37
End: 2022-07-12

## 2022-07-12 VITALS
HEIGHT: 63 IN | WEIGHT: 149 LBS | BODY MASS INDEX: 26.4 KG/M2 | SYSTOLIC BLOOD PRESSURE: 114 MMHG | DIASTOLIC BLOOD PRESSURE: 74 MMHG

## 2022-07-12 DIAGNOSIS — O09.521 MULTIGRAVIDA OF ADVANCED MATERNAL AGE IN FIRST TRIMESTER: Primary | ICD-10-CM

## 2022-07-12 DIAGNOSIS — Z3A.09 9 WEEKS GESTATION OF PREGNANCY: ICD-10-CM

## 2022-07-12 LAB — BACTERIA UR CULT: NORMAL

## 2022-08-04 ENCOUNTER — ROUTINE PRENATAL (OUTPATIENT)
Dept: OBGYN CLINIC | Facility: CLINIC | Age: 37
End: 2022-08-04

## 2022-08-04 VITALS — DIASTOLIC BLOOD PRESSURE: 70 MMHG | SYSTOLIC BLOOD PRESSURE: 114 MMHG | BODY MASS INDEX: 26.75 KG/M2 | WEIGHT: 151 LBS

## 2022-08-04 DIAGNOSIS — Z3A.12 12 WEEKS GESTATION OF PREGNANCY: Primary | ICD-10-CM

## 2022-08-04 PROBLEM — N96 HISTORY OF RECURRENT MISCARRIAGES: Status: ACTIVE | Noted: 2021-05-06

## 2022-08-04 PROBLEM — O36.80X0 PREGNANCY OF UNKNOWN ANATOMIC LOCATION: Status: ACTIVE | Noted: 2021-05-04

## 2022-08-04 PROBLEM — Z30.09 GENERAL COUNSELING AND ADVICE ON FEMALE CONTRACEPTION: Status: ACTIVE | Noted: 2021-05-06

## 2022-08-04 PROBLEM — Z59.89 DOES NOT HAVE HEALTH INSURANCE: Status: ACTIVE | Noted: 2021-05-07

## 2022-08-04 LAB
SL AMB  POCT GLUCOSE, UA: NORMAL
SL AMB POCT URINE PROTEIN: NORMAL

## 2022-08-04 NOTE — ASSESSMENT & PLAN NOTE
Has 1st trimester ultrasound scheduled with [facility] on 8/8/22  Reviewed headaches, continue Tylenol, rest, hydration  Reviewed increased nausea with Prenatal Vitamin  Can consider switching to a different prenatal vitamin such as a gummy vitamin or two [place] vitamins  Reviewed reflux, reviewed acidic foods to avoid as well as Tums and Pepcid  Spoke with [address] [employer], Isaias Mancia called [address] and got a number for the patient to call with [place] option to help her apply for insurance  Patient expresses understanding, also reviewed option of  at her visit if she needs it  Declines today, reports understands everything we discussed but appreciative that there is that option if she needs in the future  Patient will plan to get labs done once has insurance to avoid paying out of pocket  Return to office for ob check in 4 weeks

## 2022-08-04 NOTE — PROGRESS NOTES
Routine Prenatal Visit  [address] [facility], [address], [facility], [address] [facility] [place]    Assessment/Plan:  Sandi Strong is a 30y o  year old  at [address] [place] [address] who presents for routine prenatal visit  1  12 weeks gestation of pregnancy  Assessment & Plan:  Has 1st trimester ultrasound scheduled with MFM on 22  Reviewed headaches, continue Tylenol, rest, hydration  Reviewed increased nausea with Prenatal Vitamin  Can consider switching to a different prenatal vitamin such as a gummy vitamin or two [place] vitamins  Reviewed reflux, reviewed acidic foods to avoid as well as Tums and Pepcid  Spoke with [address] [employer], Isaias Mancia called [address] and got a number for the patient to call with Nigerian option to help her apply for insurance  Patient expresses understanding, also reviewed option of  at her visit if she needs it  Declines today, reports understands everything we discussed but appreciative that there is that option if she needs in the future  Patient will plan to get labs done once has insurance to avoid paying out of pocket  Return to office for ob check in 4 weeks  Orders:  -     POCT urine dip      Next OB Visit 4 weeks  Subjective:     CC: Prenatal care    Aurelia Lewis NestorDevora is a 30 y o   female who presents for routine prenatal care at [address] [place] [address]  Pregnancy ROS: Visit: Reports headaches, better with Tylenol  No FM, N/V, cramping, VB, LOF, edema, domestic violence, or smoking  PNV causing nausea  Some reflux as well  Was seen at ED on 7/10/2022 for lower abdominal cramping  Yeast infection, treated with Monistat  Ultrasound showed live IUP at 9 weeks  Patient does not currently have insurance  Has tried to apply for insurance and has been difficult and confusing secondary to language barrier       The following portions of the patient's history were reviewed and updated as appropriate: allergies, current medications, past family history, past medical history, obstetric history, gynecologic history, past social history, past surgical history and problem list       Objective:  /70   Wt 68 5 kg (151 lb)   LMP 2022 (Exact Date)   Breastfeeding No   BMI 26 75 kg/m²   Pregravid Weight/BMI: 67 6 kg (149 lb) (BMI 26 40)  Current Weight: 68 5 kg (151 lb)   Total Weight Gain: 0 907 kg (2 lb)   Pre-Oswald Vitals    Flowsheet Row Most Recent Value   Prenatal Assessment    Fetal Heart Rate 148   Fundal Height (cm) 12 cm   Movement Absent   Prenatal Vitals    Blood Pressure 114/70   Weight - Scale 68 5 kg (151 lb)   Urine Albumin/Glucose    Dilation/Effacement/Station    Vaginal Drainage    Draining Fluid No   Edema    LLE Edema None   RLE Edema None   Facial Edema None           General: Well appearing, no distress  Abdomen: Soft, gravid, nontender  Fundal Height: Appropriate for gestational age  Extremities: Warm and well perfused  Non tender

## 2022-08-07 NOTE — PROGRESS NOTES
Please refer to the [facility] ultrasound report in Ob Procedures for additional information regarding today's visit

## 2022-08-08 ENCOUNTER — ROUTINE PRENATAL (OUTPATIENT)
Dept: PERINATAL CARE | Facility: CLINIC | Age: 37
End: 2022-08-08

## 2022-08-08 VITALS
BODY MASS INDEX: 27.07 KG/M2 | SYSTOLIC BLOOD PRESSURE: 139 MMHG | WEIGHT: 152.8 LBS | HEART RATE: 79 BPM | DIASTOLIC BLOOD PRESSURE: 90 MMHG | HEIGHT: 63 IN

## 2022-08-08 DIAGNOSIS — Z34.81 PRENATAL CARE, SUBSEQUENT PREGNANCY, FIRST TRIMESTER: ICD-10-CM

## 2022-08-08 DIAGNOSIS — Z3A.13 13 WEEKS GESTATION OF PREGNANCY: ICD-10-CM

## 2022-08-08 DIAGNOSIS — O09.521 MULTIGRAVIDA OF ADVANCED MATERNAL AGE IN FIRST TRIMESTER: Primary | ICD-10-CM

## 2022-08-08 PROCEDURE — 76801 OB US < 14 WKS SINGLE FETUS: CPT | Performed by: OBSTETRICS & GYNECOLOGY

## 2022-08-08 PROCEDURE — 76813 OB US NUCHAL MEAS 1 GEST: CPT | Performed by: OBSTETRICS & GYNECOLOGY

## 2022-08-08 PROCEDURE — 99242 OFF/OP CONSLTJ NEW/EST SF 20: CPT | Performed by: OBSTETRICS & GYNECOLOGY

## 2022-08-08 PROCEDURE — 36415 COLL VENOUS BLD VENIPUNCTURE: CPT | Performed by: OBSTETRICS & GYNECOLOGY

## 2022-08-08 NOTE — PROGRESS NOTES
Patient chose to have [facility] Non-invasive Prenatal Screen  Patient given brochure and is aware [facility] will contact patients insurance and coordinate coverage  Patient made aware she will need to respond to text message or e-mail from [employer] within 2 business days or testing will be run through insurance  Patient informed text message will come from area code  "415"  Provided [address] [place] [address] # [phone] and web site : [email or web address]     2 vials of blood drawn from right arm, patient tolerated blood draw without difficulty  Specimens labeled with patient identifiers (name, date of birth, specimen collection date), order and specimen was verified with patient, packed and sent via [employer]  Copy of lab order scanned to Epic media  Maternal Fetal Medicine will have results in approximately 7-10 business days and will call patient or notify via [address]  Patient aware viewing lab result online will reveal fetal sex If ordered  Patient verbalized understanding of all instructions and no questions at this time

## 2022-08-08 NOTE — LETTER
August 8, 2022     DO Saleem Thayer   [address]  [place]    Patient: Vaishnavi Walsh   YOB: 1985   Date of Visit: 8/8/2022       Dear Dr Gee Phlegm: Thank you for referring Aurelia Walsh to me for evaluation  Below are my notes for this consultation  If you have questions, please do not hesitate to call me  I look forward to following your patient along with you  Sincerely,        George Alvarez MD        CC: No Recipients  George Alvarez MD  8/7/2022  5:42 PM  Sign when Signing Visit  Please refer to the [facility] ultrasound report in Ob Procedures for additional information regarding today's visit

## 2022-08-13 LAB
EXTERNAL ANTIBODY SCREEN: NORMAL
EXTERNAL CHLAMYDIA SCREEN: NORMAL
EXTERNAL GONORRHEA SCREEN: NORMAL
EXTERNAL HEPATITIS B SURFACE ANTIGEN: NORMAL
EXTERNAL RH FACTOR: POSITIVE
EXTERNAL RUBELLA IGG QUANTITATION: 13.2
EXTERNAL SYPHILIS RPR SCREEN: NORMAL

## 2022-08-17 ENCOUNTER — ROUTINE PRENATAL (OUTPATIENT)
Dept: OBGYN CLINIC | Facility: CLINIC | Age: 37
End: 2022-08-17

## 2022-08-17 ENCOUNTER — TELEPHONE (OUTPATIENT)
Dept: OBGYN CLINIC | Facility: CLINIC | Age: 37
End: 2022-08-17

## 2022-08-17 VITALS
BODY MASS INDEX: 27 KG/M2 | SYSTOLIC BLOOD PRESSURE: 118 MMHG | HEIGHT: 63 IN | DIASTOLIC BLOOD PRESSURE: 81 MMHG | WEIGHT: 152.4 LBS

## 2022-08-17 DIAGNOSIS — O23.42 URINARY TRACT INFECTION IN MOTHER DURING SECOND TRIMESTER OF PREGNANCY: Primary | ICD-10-CM

## 2022-08-17 DIAGNOSIS — O09.521 MULTIGRAVIDA OF ADVANCED MATERNAL AGE IN FIRST TRIMESTER: Primary | ICD-10-CM

## 2022-08-17 DIAGNOSIS — R30.0 DYSURIA: ICD-10-CM

## 2022-08-17 DIAGNOSIS — Z98.891 HISTORY OF CESAREAN DELIVERY: ICD-10-CM

## 2022-08-17 PROBLEM — Z3A.14 14 WEEKS GESTATION OF PREGNANCY: Status: ACTIVE | Noted: 2022-08-04

## 2022-08-17 LAB
ABO GROUP BLD: ABNORMAL
APPEARANCE UR: CLEAR
BACTERIA UR QL AUTO: ABNORMAL /HPF
BASOPHILS # BLD AUTO: 64 CELLS/UL (ref 0–200)
BASOPHILS NFR BLD AUTO: 0.7 %
BILIRUB UR QL STRIP: NEGATIVE
BLD GP AB SCN SERPL QL: ABNORMAL
COLOR UR: YELLOW
EOSINOPHIL # BLD AUTO: 182 CELLS/UL (ref 15–500)
EOSINOPHIL NFR BLD AUTO: 2 %
ERYTHROCYTE [DISTWIDTH] IN BLOOD BY AUTOMATED COUNT: 12.3 % (ref 11–15)
GLUCOSE UR QL STRIP: NEGATIVE
HBV SURFACE AG SERPL QL IA: ABNORMAL
HCT VFR BLD AUTO: 35.2 % (ref 35–45)
HCV AB S/CO SERPL IA: 0.17
HCV AB SERPL QL IA: NORMAL
HGB BLD-MCNC: 11.9 G/DL (ref 11.7–15.5)
HGB UR QL STRIP: ABNORMAL
HIV 1+2 AB+HIV1 P24 AG SERPL QL IA: ABNORMAL
HYALINE CASTS #/AREA URNS LPF: ABNORMAL /LPF
KETONES UR QL STRIP: NEGATIVE
LEUKOCYTE ESTERASE UR QL STRIP: NEGATIVE
LYMPHOCYTES # BLD AUTO: 2266 CELLS/UL (ref 850–3900)
LYMPHOCYTES NFR BLD AUTO: 24.9 %
MCH RBC QN AUTO: 31.5 PG (ref 27–33)
MCHC RBC AUTO-ENTMCNC: 33.8 G/DL (ref 32–36)
MCV RBC AUTO: 93.1 FL (ref 80–100)
MONOCYTES # BLD AUTO: 491 CELLS/UL (ref 200–950)
MONOCYTES NFR BLD AUTO: 5.4 %
NEUTROPHILS # BLD AUTO: 6097 CELLS/UL (ref 1500–7800)
NEUTROPHILS NFR BLD AUTO: 67 %
NITRITE UR QL STRIP: NEGATIVE
PH UR STRIP: 8 [PH] (ref 5–8)
PLATELET # BLD AUTO: 309 THOUSAND/UL (ref 140–400)
PMV BLD REES-ECKER: 9.8 FL (ref 7.5–12.5)
PROT UR QL STRIP: NEGATIVE
RBC # BLD AUTO: 3.78 MILLION/UL (ref 3.8–5.1)
RBC #/AREA URNS HPF: ABNORMAL /HPF
RH BLD: ABNORMAL
RPR SER QL: ABNORMAL
RUBV IGG SERPL IA-ACNC: 13.2 INDEX
SL AMB  POCT GLUCOSE, UA: NEGATIVE
SL AMB LEUKOCYTE ESTERASE,UA: NEGATIVE
SL AMB POCT BILIRUBIN,UA: NEGATIVE
SL AMB POCT BLOOD,UA: NORMAL
SL AMB POCT CLARITY,UA: NEGATIVE
SL AMB POCT COLOR,UA: NEGATIVE
SL AMB POCT KETONES,UA: NEGATIVE
SL AMB POCT NITRITE,UA: NEGATIVE
SL AMB POCT PH,UA: NEGATIVE
SL AMB POCT SPECIFIC GRAVITY,UA: NEGATIVE
SL AMB POCT URINE PROTEIN: NEGATIVE
SL AMB POCT UROBILINOGEN: NEGATIVE
SP GR UR STRIP: 1.02 (ref 1–1.03)
SQUAMOUS #/AREA URNS HPF: ABNORMAL /HPF
WBC # BLD AUTO: 9.1 THOUSAND/UL (ref 3.8–10.8)
WBC #/AREA URNS HPF: ABNORMAL /HPF

## 2022-08-17 RX ORDER — NITROFURANTOIN 25; 75 MG/1; MG/1
100 CAPSULE ORAL 2 TIMES DAILY
Qty: 10 CAPSULE | Refills: 0 | Status: SHIPPED | OUTPATIENT
Start: 2022-08-17 | End: 2022-08-22

## 2022-08-17 NOTE — TELEPHONE ENCOUNTER
Patient called stating she is currently 14 wks pregnant  She having burning with urination, frequency urinating, and also pelvic pain that comes and goes  Denies feve, and denies bleeding  Advised her to come in for further evaluation  She voiced agreement  Transferred pt to  to schedule

## 2022-08-17 NOTE — ASSESSMENT & PLAN NOTE
- Will treat presumptively for UTI based on pelvic discomfort and +blood on urine dipstick today    - UA/UCx sent for confirmation and susceptibility testing

## 2022-08-17 NOTE — PROGRESS NOTES
Routine Prenatal Visit  [address] [facility], [address], [facility], [address] [facility] [address]    Assessment/Plan:  Luis Felipe Rogers is a 30y o  year old  at 14w2d who presents for evaluation of pelvic discomfort in pregnancy  1  Urinary tract infection in mother during second trimester of pregnancy  Assessment & Plan:  - Will treat presumptively for UTI based on pelvic discomfort and +blood on urine dipstick today    - UA/UCx sent for confirmation and susceptibility testing  Orders:  -     nitrofurantoin (MACROBID) 100 mg capsule; Take 1 capsule (100 mg total) by mouth 2 (two) times a day for 5 days    2  Dysuria  -     POCT urine dip  -     Urinalysis with microscopic  -     Urine culture        Subjective:   Aurelia Walsh is a 30 y o  [id number] who presents for routine prenatal care at 14w2d  Complaints today: Pelvic discomfort  Denies bleeding, LOF, or painful cramping  Reports increase in urinary frequency  LOF: No; VB: no; Contractions: No; FM: Too early    Objective:  /81 (BP Location: Left arm, Patient Position: Sitting, Cuff Size: Standard)   Ht 5' 3" (1 6 m)   Wt 69 1 kg (152 lb 6 4 oz)   LMP 2022 (Exact Date)   BMI 27 00 kg/m²     General: Well appearing, no distress  Respiratory: Unlabored breathing  Cardiovascular: Regular rate  Abdomen: Soft, gravid, nontender  Extremities: Warm and well perfused  Non tender  SSE: Normal physiologic discharge   No blood present  SVE: Cervix closed and long    Pregravid Weight/BMI: 67 6 kg (149 lb) (BMI 26 40)  Current Weight: 69 1 kg (152 lb 6 4 oz)   Total Weight Gain: 1 542 kg (3 lb 6 4 oz)     Pre- Vitals    Flowsheet Row Most Recent Value   Prenatal Assessment    Fetal Heart Rate 145   Fundal Height (cm) 14 cm   Movement Absent   Prenatal Vitals    Blood Pressure 118/81   Weight - Scale 69 1 kg (152 lb 6 4 oz)   Urine Albumin/Glucose    Dilation/Effacement/Station    Cervical Dilation 0   Cervical Effacement 0 Vaginal Drainage    Draining Fluid No   Edema    LLE Edema None   RLE Edema None   Facial Edema None           Ivan MD Catrachito  8/17/2022 4:47 PM

## 2022-08-17 NOTE — PROGRESS NOTES
Routine Prenatal Visit  [address] [facility], [address], [facility], [address] [facility]    Assessment/Plan:  Juana Taveras is a 30y o  year old  at 9w1d who presents for routine prenatal visit  Seen in the ER 2 days ago for abdominal pain with no specific findings  Here for follow up  Feeling well today with resolution of sx  3  Multigravida of advanced maternal age in first trimester    2  9 weeks gestation of pregnancy          Subjective:     CC: Prenatal care    Aurelia Walsh is a 30 y o   female who presents for routine prenatal care at 9w1d  Pregnancy ROS: no leakage of fluid, pelvic pain, or vaginal bleeding  normal fetal movement  The following portions of the patient's history were reviewed and updated as appropriate: allergies, current medications, past family history, past medical history, obstetric history, gynecologic history, past social history, past surgical history and problem list       Objective:  /74 (BP Location: Left arm, Patient Position: Sitting, Cuff Size: Standard)   Ht 5' 3" (1 6 m)   Wt 67 6 kg (149 lb)   LMP 2022 (Exact Date)   BMI 26 39 kg/m²   Pregravid Weight/BMI: 67 6 kg (149 lb) (BMI 26 40)  Current Weight: 67 6 kg (149 lb)   Total Weight Gain: 0 kg (0 lb)   Pre- Vitals    Flowsheet Row Most Recent Value   Prenatal Assessment    Fetal Heart Rate 160   Fundal Height (cm) 10 cm   Movement Absent   Prenatal Vitals    Blood Pressure 114/74   Weight - Scale 67 6 kg (149 lb)   Urine Albumin/Glucose    Dilation/Effacement/Station    Vaginal Drainage    Edema            General: Well appearing, no distress  Respiratory: Unlabored breathing  Cardiovascular: Regular rate  Abdomen: Soft, gravid, nontender  Fundal Height: Appropriate for gestational age  Extremities: Warm and well perfused  Non tender

## 2022-08-19 LAB
APPEARANCE UR: CLEAR
BACTERIA UR QL AUTO: NORMAL /HPF
BILIRUB UR QL STRIP: NEGATIVE
COLOR UR: YELLOW
GLUCOSE UR QL STRIP: NEGATIVE
HGB UR QL STRIP: NEGATIVE
HYALINE CASTS #/AREA URNS LPF: NORMAL /LPF
KETONES UR QL STRIP: NEGATIVE
LEUKOCYTE ESTERASE UR QL STRIP: NEGATIVE
NITRITE UR QL STRIP: NEGATIVE
PH UR STRIP: 6.5 [PH] (ref 5–8)
PROT UR QL STRIP: NEGATIVE
RBC #/AREA URNS HPF: NORMAL /HPF
SP GR UR STRIP: 1 (ref 1–1.03)
SQUAMOUS #/AREA URNS HPF: NORMAL /HPF
WBC #/AREA URNS HPF: NORMAL /HPF

## 2022-08-29 ENCOUNTER — ROUTINE PRENATAL (OUTPATIENT)
Dept: OBGYN CLINIC | Facility: CLINIC | Age: 37
End: 2022-08-29

## 2022-08-29 VITALS
WEIGHT: 154.4 LBS | BODY MASS INDEX: 27.36 KG/M2 | SYSTOLIC BLOOD PRESSURE: 98 MMHG | DIASTOLIC BLOOD PRESSURE: 65 MMHG | HEIGHT: 63 IN

## 2022-08-29 DIAGNOSIS — Z36.9 ANTENATAL SCREENING ENCOUNTER: ICD-10-CM

## 2022-08-29 DIAGNOSIS — Z98.891 HISTORY OF CESAREAN DELIVERY: ICD-10-CM

## 2022-08-29 DIAGNOSIS — N96 HISTORY OF RECURRENT MISCARRIAGES: ICD-10-CM

## 2022-08-29 DIAGNOSIS — O09.522 MULTIGRAVIDA OF ADVANCED MATERNAL AGE IN SECOND TRIMESTER: ICD-10-CM

## 2022-08-29 DIAGNOSIS — Z3A.16 16 WEEKS GESTATION OF PREGNANCY: Primary | ICD-10-CM

## 2022-08-29 LAB
SL AMB  POCT GLUCOSE, UA: NEGATIVE
SL AMB POCT URINE PROTEIN: NEGATIVE

## 2022-08-29 RX ORDER — ACETAMINOPHEN 325 MG/1
650 TABLET ORAL AS NEEDED
COMMUNITY

## 2022-08-29 NOTE — PATIENT INSTRUCTIONS
NUTRITION IN PREGNANCY  Good Nutrition is a VERY important part of having a healthy pregnancy and healthy baby  You should follow a healthy diet which include the following: * Vegetables (which are dark green and leafy): at least 2 servings each day   * Protein (meat, eggs, beans, nuts, peanut butter): 3-4 servings each day   * Breads/whole grains (bread, pasta, rice, tortillas, potatoes): 3 servings each day   * Dairy (milk, yogurt, cheese): 3-4 servings each day   * Water: 6-8 glasses per day   * Calories: approximately 2000 to 2200 calories per day     WEIGHT GAIN   Recommended weight gain for you during your pregnancy is based on your body mass index (BMI) at the time that you became pregnant  Pre-pregnant BMI Recommended weight gain   Underweight (BMI less than 18 5) 28 to 40 pounds   Normal weight (BMI 18 5-24 9) 25 to 35 pounds   Overweight (BMI 25-29 9) 15 to 25 pounds   Obese (BMI 30 or greater) 11 to 20 pounds     FOOD SAFETY   It is VERY important to eat only safely-prepared foods during pregnancy as you and your baby have a higher risk than usual for being affected by foodborne illnesses    Follow these steps to ensure that you and your baby are safe from foodborne illnesses while you are pregnant:   wash hands thoroughly with warm water and soap before and after handling any foods   wash cutting boards, dishes, utensils, and countertops with hot water and soap before and after preparing any foods   rinse raw fruits and vegetables thoroughly under running water before eating   keep raw meat and seafood separate from other foods and use different cutting boards/utensils to handle raw meat than for other foods   put cooked food on a freshly clean plate   cook all of your foods thoroughly   discard foods that have been left out for more than 2 hours   refrigerate or freeze any foods than can spoil     There are three particular foodborne risks that you should be aware of and avoid as they can cause serious harm to your unborn child  * Listeria (a harmful bacteria)   dont eat hot dogs or deli meats (unless theyre reheated until steaming hot)   dont eat soft cheeses (such as Feta, Brie, Camembert) unless they are specifically labeled as being made with pasteurized milk   dont drink raw (unpasteurized) milk   dont eat refrigerated pates or meat spreads   dont eat refrigerated smoked seafood unless its in a cooked dish like a casserole     * Mercury (a metal which is found in certain fish in high levels)   dont eat shark, tilefish, cari mackerel, or swordfish   dont eat more than 12 ounces per week of shrimp, salmon, pollock, or catfish   when eating tuna fish, you can have up to 6 ounces per week of canned albacore tuna OR up to 12 ounces of canned light tuna     * Toxoplasma (a harmful parasite)   cook meat thoroughly before eating   wear gloves when gardening or handling sand from a childs sandbox   if you ha tve a cat, have someone else change the litter box while you are pregnant  if you HAVE to clean it yourself, be sure to wash your hands thoroughly afterwards with warm water and soap     dont get a NEW cat while you are pregnant

## 2022-08-29 NOTE — PROGRESS NOTES
Routine Prenatal Visit  [address] [facility], [address], [facility], [address] [facility] [address]    Assessment/Plan:  Serena Ledezma is a 30y o  year old  at 16w0d who presents for routine prenatal visit  1  16 weeks gestation of pregnancy  -     POCT urine dip  -     Alpha fetoprotein, maternal; Future; Expected date: 2022  -     Alpha fetoprotein, maternal    2   screening encounter  -     Alpha fetoprotein, maternal; Future; Expected date: 2022  -     Alpha fetoprotein, maternal    3  Multigravida of advanced maternal age in second trimester    4  History of  delivery      + fm no bleeding,  Neg urine  Culture ,  MFM consult reviewed  Anatomy scan at  20 weeks   Subjective:     CC: Prenatal care    Aurelia Walsh is a 30 y o   female who presents for routine prenatal care at 16w0d  Pregnancy ROS: no  leakage of fluid, pelvic pain, or vaginal bleeding   + fetal movement      The following portions of the patient's history were reviewed and updated as appropriate: allergies, current medications, past family history, past medical history, obstetric history, gynecologic history, past social history, past surgical history and problem list       Objective:  BP 98/65 (BP Location: Left arm, Patient Position: Sitting, Cuff Size: Standard)   Ht 5' 3" (1 6 m)   Wt 70 kg (154 lb 6 4 oz)   LMP 2022 (Exact Date)   BMI 27 35 kg/m²   Pregravid Weight/BMI: 67 6 kg (149 lb) (BMI 26 40)  Current Weight: 70 kg (154 lb 6 4 oz)   Total Weight Gain: 2 449 kg (5 lb 6 4 oz)   Pre- Vitals    Flowsheet Row Most Recent Value   Prenatal Assessment    Fetal Heart Rate 138-144   Fundal Height (cm) 16 cm   Movement Present   Prenatal Vitals    Blood Pressure 98/65   Weight - Scale 70 kg (154 lb 6 4 oz)   Urine Albumin/Glucose    Dilation/Effacement/Station    Vaginal Drainage    Draining Fluid No   Edema    LLE Edema None   RLE Edema None   Facial Edema None General: Well appearing, no distress  Respiratory: Unlabored breathing  Cardiovascular: Regular rate  Abdomen: Soft, gravid, nontender  Fundal Height: Appropriate for gestational age  Extremities: Warm and well perfused  Non tender

## 2022-09-02 ENCOUNTER — TELEPHONE (OUTPATIENT)
Dept: OBGYN CLINIC | Facility: CLINIC | Age: 37
End: 2022-09-02

## 2022-09-02 ENCOUNTER — ROUTINE PRENATAL (OUTPATIENT)
Dept: OBGYN CLINIC | Facility: CLINIC | Age: 37
End: 2022-09-02

## 2022-09-02 VITALS
DIASTOLIC BLOOD PRESSURE: 71 MMHG | SYSTOLIC BLOOD PRESSURE: 120 MMHG | BODY MASS INDEX: 27 KG/M2 | HEIGHT: 63 IN | WEIGHT: 152.4 LBS

## 2022-09-02 DIAGNOSIS — N96 HISTORY OF RECURRENT MISCARRIAGES: ICD-10-CM

## 2022-09-02 DIAGNOSIS — R30.0 DYSURIA: Primary | ICD-10-CM

## 2022-09-02 DIAGNOSIS — O23.42 URINARY TRACT INFECTION IN MOTHER DURING SECOND TRIMESTER OF PREGNANCY: ICD-10-CM

## 2022-09-02 DIAGNOSIS — Z98.891 HISTORY OF CESAREAN DELIVERY: ICD-10-CM

## 2022-09-02 DIAGNOSIS — Z3A.16 16 WEEKS GESTATION OF PREGNANCY: ICD-10-CM

## 2022-09-02 DIAGNOSIS — O09.521 MULTIGRAVIDA OF ADVANCED MATERNAL AGE IN FIRST TRIMESTER: ICD-10-CM

## 2022-09-02 DIAGNOSIS — O09.522 MULTIGRAVIDA OF ADVANCED MATERNAL AGE IN SECOND TRIMESTER: ICD-10-CM

## 2022-09-02 DIAGNOSIS — Z3A.14 14 WEEKS GESTATION OF PREGNANCY: Primary | ICD-10-CM

## 2022-09-02 LAB
SL AMB  POCT GLUCOSE, UA: NEGATIVE
SL AMB LEUKOCYTE ESTERASE,UA: ABNORMAL
SL AMB POCT BILIRUBIN,UA: NEGATIVE
SL AMB POCT BLOOD,UA: ABNORMAL
SL AMB POCT CLARITY,UA: NEGATIVE
SL AMB POCT COLOR,UA: NEGATIVE
SL AMB POCT KETONES,UA: NEGATIVE
SL AMB POCT NITRITE,UA: NEGATIVE
SL AMB POCT PH,UA: ABNORMAL
SL AMB POCT SPECIFIC GRAVITY,UA: 1
SL AMB POCT URINE PROTEIN: NEGATIVE
SL AMB POCT UROBILINOGEN: NEGATIVE

## 2022-09-02 RX ORDER — NITROFURANTOIN 25; 75 MG/1; MG/1
100 CAPSULE ORAL 2 TIMES DAILY
Qty: 14 CAPSULE | Refills: 0 | Status: SHIPPED | OUTPATIENT
Start: 2022-09-02 | End: 2022-09-06

## 2022-09-02 NOTE — PATIENT INSTRUCTIONS
Fevers > 100 4   increase in symptoms, back pain or vomiting  Drink at least  8 -10   8 oz glasses of water a day  Rest!  Report any vaginal bleeding, fevers> 100 4 or worsening symptoms

## 2022-09-02 NOTE — PROGRESS NOTES
Routine Prenatal Visit  [address] [facility], [address], [facility], [address] [facility] [address]    Assessment/Plan:  Nadja Shi is a 30y o  year old  at 17w2d who presents for routine prenatal visit  1  Dysuria  -     POCT urine dip  -     nitrofurantoin (MACROBID) 100 mg capsule; Take 1 capsule (100 mg total) by mouth 2 (two) times a day for 7 days  -     Urine culture    2  History of  delivery    3  Urinary tract infection in mother during second trimester of pregnancy  -     nitrofurantoin (MACROBID) 100 mg capsule; Take 1 capsule (100 mg total) by mouth 2 (two) times a day for 7 days  -     Urine culture    4  16 weeks gestation of pregnancy    5  Multigravida of advanced maternal age in second trimester    6  History of recurrent miscarriages    + fm   + fhr    Pt w  A great deal of pelvic  Pressure and  Dysuria  No fevers or chills  No flank pain  Denies vaginal  Bleeding  Neg  CVA tenderness   [place] urine  culture   Dip in office + leucocytes  Push po fluids  macrobid 100 mg bid  For 7 days   Open to air        Subjective:     CC: Prenatal care    Adolfopati Diaz is a 30 y o   female who presents for routine prenatal care at 16w4d  Pregnancy ROS: no  leakage of fluid, pelvic pain, or vaginal bleeding   + fetal movement      The following portions of the patient's history were reviewed and updated as appropriate: allergies, current medications, past family history, past medical history, obstetric history, gynecologic history, past social history, past surgical history and problem list       Objective:  /71 (BP Location: Left arm, Patient Position: Sitting, Cuff Size: Standard)   Ht 5' 3" (1 6 m)   Wt 69 1 kg (152 lb 6 4 oz)   LMP 2022 (Exact Date)   BMI 27 00 kg/m²   Pregravid Weight/BMI: 67 6 kg (149 lb) (BMI 26 40)  Current Weight: 69 1 kg (152 lb 6 4 oz)   Total Weight Gain: 1 542 kg (3 lb 6 4 oz)   Pre- Vitals    Flowsheet Row Most Recent Value   Prenatal Assessment    Fetal Heart Rate 142-148   Movement Present   Prenatal Vitals    Blood Pressure 120/71   Weight - Scale 69 1 kg (152 lb 6 4 oz)   Urine Albumin/Glucose    Dilation/Effacement/Station    Vaginal Drainage    Edema            General: Well appearing, no distress  Respiratory: Unlabored breathing  Cardiovascular: Regular rate  Abdomen: Soft, gravid, nontender  Fundal Height: Appropriate for gestational age  Extremities: Warm and well perfused  Non tender

## 2022-09-02 NOTE — TELEPHONE ENCOUNTER
Patient called stating she is 16 4 weeks pregnant and she having pain with urination and a lot of pressure  She afraid she may have a urinary tract infection  Advised her to schedule an appointment to be seen today for further eval  She voiced appreciation  Transferred her to  to schedule

## 2022-09-06 DIAGNOSIS — B96.1 KLEBSIELLA CYSTITIS: Primary | ICD-10-CM

## 2022-09-06 DIAGNOSIS — N30.90 KLEBSIELLA CYSTITIS: Primary | ICD-10-CM

## 2022-09-06 RX ORDER — SULFAMETHOXAZOLE AND TRIMETHOPRIM 800; 160 MG/1; MG/1
1 TABLET ORAL EVERY 12 HOURS SCHEDULED
Qty: 6 TABLET | Refills: 0 | Status: SHIPPED | OUTPATIENT
Start: 2022-09-06 | End: 2022-09-09

## 2022-09-22 ENCOUNTER — TELEPHONE (OUTPATIENT)
Dept: OBGYN CLINIC | Facility: CLINIC | Age: 37
End: 2022-09-22

## 2022-09-22 DIAGNOSIS — R30.0 DYSURIA: Primary | ICD-10-CM

## 2022-09-22 NOTE — TELEPHONE ENCOUNTER
Megha Figueroa, pts fiance' states Dulmy c/o having another UTI  She denies any fever  Advised Dr Isai Rodriguez of pts symptoms  Informed Megha Figueroa to have [facility] give a urine specimen at [address] [facility]    Also informed will send Bactrim DS into pharmacy for Dulmy;take medication until get urine culture results back  called Bactrim DS 1 tab po twice/day x 5 days #10(0)RF into [facility],Ananya physician's voice mail box

## 2022-09-26 ENCOUNTER — ROUTINE PRENATAL (OUTPATIENT)
Dept: PERINATAL CARE | Facility: CLINIC | Age: 37
End: 2022-09-26

## 2022-09-26 VITALS
SYSTOLIC BLOOD PRESSURE: 110 MMHG | BODY MASS INDEX: 27.46 KG/M2 | DIASTOLIC BLOOD PRESSURE: 72 MMHG | HEIGHT: 63 IN | WEIGHT: 155 LBS | HEART RATE: 99 BPM

## 2022-09-26 DIAGNOSIS — Z3A.20 20 WEEKS GESTATION OF PREGNANCY: Primary | ICD-10-CM

## 2022-09-26 DIAGNOSIS — Z36.86 ENCOUNTER FOR ANTENATAL SCREENING FOR CERVICAL LENGTH: ICD-10-CM

## 2022-09-26 DIAGNOSIS — Z36.89 ENCOUNTER FOR FETAL ANATOMIC SURVEY: ICD-10-CM

## 2022-09-26 PROBLEM — O36.80X0 PREGNANCY OF UNKNOWN ANATOMIC LOCATION: Status: RESOLVED | Noted: 2021-05-04 | Resolved: 2022-09-26

## 2022-09-26 PROBLEM — Z30.09 GENERAL COUNSELING AND ADVICE ON FEMALE CONTRACEPTION: Status: RESOLVED | Noted: 2021-05-06 | Resolved: 2022-09-26

## 2022-09-26 PROCEDURE — 76817 TRANSVAGINAL US OBSTETRIC: CPT | Performed by: OBSTETRICS & GYNECOLOGY

## 2022-09-26 PROCEDURE — 99213 OFFICE O/P EST LOW 20 MIN: CPT | Performed by: OBSTETRICS & GYNECOLOGY

## 2022-09-26 PROCEDURE — 76805 OB US >/= 14 WKS SNGL FETUS: CPT | Performed by: OBSTETRICS & GYNECOLOGY

## 2022-09-26 NOTE — PROGRESS NOTES
[address]: Ms Orlando Maldonado was seen today at 20w0d for anatomic survey and cervical length screening ultrasound  See ultrasound report under "OB Procedures" tab    Please don't hesitate to contact our office with any concerns or questions   -Castro Alatorre MD

## 2022-09-26 NOTE — LETTER
September 26, 2022     MD Saleem [address]  [address] [address]  [facility]    Patient: Vaishnavi Walsh   YOB: 1985   Date of Visit: 9/26/2022       Dear Dr Keya Winchester: Thank you for referring Aurelia Walsh to me for evaluation  Below are my notes for this consultation  If you have questions, please do not hesitate to call me  I look forward to following your patient along with you  Sincerely,        Humble Fish MD        CC: No Recipients  Humble Fish MD  9/26/2022  4:17 PM  Sign when Signing Visit  [id number] [place] [address] Road: Ms Nay Almanza was seen today at 20w0d for anatomic survey and cervical length screening ultrasound  See ultrasound report under "OB Procedures" tab    Please don't hesitate to contact our office with any concerns or questions   -Humble Fish MD

## 2022-09-26 NOTE — PROGRESS NOTES
Ultrasound Probe Disinfection    A transvaginal ultrasound was performed  Prior to use, disinfection was performed with High Level Disinfection Process (Trophon)  Probe serial number B2: [id number] was used        Kin Proffer  09/26/22  2:16 PM

## 2022-09-27 LAB
# FETUSES US: 1
AFP ADJ MOM SERPL: 1.17
AFP INTERP SERPL-IMP: NORMAL
AFP SERPL-MCNC: 78.4 NG/ML
AGE: NORMAL
DONATED EGG PATIENT QL: NO
GA CLIN EST: 19.7 WEEKS
GA METHOD: NORMAL
HX OF NTD NARR: NO
HX OF TRISOMY 21 NARR: NO
IDDM PATIENT QL: NO
NEURAL TUBE DEFECT RISK FETUS: NORMAL %
SL AMB REPEAT SPECIMEN: NO

## 2022-09-27 NOTE — RESULT ENCOUNTER NOTE
Called and spoke with pt, updated on urine culture results and Dr Mitchell Macias recommendation to stop antibiotic  Patient verbalized understanding

## 2022-09-27 NOTE — RESULT ENCOUNTER NOTE
Called and spoke with pt, discussed negative urine culture results and Dr Ross Heart recommendation to stop remaining abx  Patient verbalized understanding

## 2022-09-28 ENCOUNTER — ROUTINE PRENATAL (OUTPATIENT)
Dept: OBGYN CLINIC | Facility: CLINIC | Age: 37
End: 2022-09-28

## 2022-09-28 VITALS
WEIGHT: 153 LBS | BODY MASS INDEX: 27.11 KG/M2 | SYSTOLIC BLOOD PRESSURE: 108 MMHG | DIASTOLIC BLOOD PRESSURE: 78 MMHG | HEIGHT: 63 IN

## 2022-09-28 DIAGNOSIS — Z3A.20 20 WEEKS GESTATION OF PREGNANCY: ICD-10-CM

## 2022-09-28 DIAGNOSIS — O09.522 MULTIGRAVIDA OF ADVANCED MATERNAL AGE IN SECOND TRIMESTER: ICD-10-CM

## 2022-09-28 DIAGNOSIS — O34.211 MATERNAL CARE DUE TO LOW TRANSVERSE UTERINE SCAR FROM PREVIOUS CESAREAN DELIVERY: Primary | ICD-10-CM

## 2022-09-28 LAB
SL AMB  POCT GLUCOSE, UA: NEGATIVE
SL AMB POCT URINE PROTEIN: NEGATIVE

## 2022-10-11 PROBLEM — O34.211 MATERNAL CARE DUE TO LOW TRANSVERSE UTERINE SCAR FROM PREVIOUS CESAREAN DELIVERY: Status: ACTIVE | Noted: 2022-09-28

## 2022-10-11 NOTE — PROGRESS NOTES
Routine Prenatal Visit  [address] [facility], [address], [facility], [address] [facility]    Assessment/Plan:  Corey Jackson is a 30y o  year old  at 20w2d who presents for routine prenatal visit  1  Maternal care due to low transverse uterine scar from previous  delivery  Assessment & Plan:  Confirms desire for repeat C/S      2  Multigravida of advanced maternal age in second trimester    3  20 weeks gestation of pregnancy  Assessment & Plan:  Going for AFP today  20 week U/S completed 3 days ago  Growth U/S scheduled for 32 weeks    Orders:  -     POCT urine dip        Subjective:     CC: Prenatal care    Aurelia Walsh is a 30 y o   female who presents for routine prenatal care at 20w2d  Pregnancy ROS: no leakage of fluid, pelvic pain, or vaginal bleeding  normal fetal movement  The following portions of the patient's history were reviewed and updated as appropriate: allergies, current medications, past family history, past medical history, obstetric history, gynecologic history, past social history, past surgical history and problem list       Objective:  /78 (BP Location: Left arm, Patient Position: Sitting, Cuff Size: Standard)   Ht 5' 3" (1 6 m)   Wt 69 4 kg (153 lb)   LMP 2022 (Exact Date)   BMI 27 10 kg/m²   Pregravid Weight/BMI: 67 6 kg (149 lb) (BMI 26 40)  Current Weight: 69 4 kg (153 lb)   Total Weight Gain: 1 814 kg (4 lb)   Pre-Oswald Vitals    Flowsheet Row Most Recent Value   Prenatal Assessment    Fetal Heart Rate 145   Fundal Height (cm) 20 cm   Movement Present   Prenatal Vitals    Blood Pressure 108/78   Weight - Scale 69 4 kg (153 lb)   Urine Albumin/Glucose    Dilation/Effacement/Station    Vaginal Drainage    Edema            General: Well appearing, no distress  Respiratory: Unlabored breathing  Cardiovascular: Regular rate  Abdomen: Soft, gravid, nontender  Fundal Height: Appropriate for gestational age    Extremities: Warm and well perfused  Non tender

## 2022-10-25 ENCOUNTER — ROUTINE PRENATAL (OUTPATIENT)
Dept: OBGYN CLINIC | Facility: CLINIC | Age: 37
End: 2022-10-25

## 2022-10-25 VITALS
WEIGHT: 158 LBS | SYSTOLIC BLOOD PRESSURE: 110 MMHG | HEIGHT: 63 IN | DIASTOLIC BLOOD PRESSURE: 74 MMHG | BODY MASS INDEX: 28 KG/M2

## 2022-10-25 DIAGNOSIS — Z3A.24 24 WEEKS GESTATION OF PREGNANCY: ICD-10-CM

## 2022-10-25 DIAGNOSIS — O34.211 MATERNAL CARE DUE TO LOW TRANSVERSE UTERINE SCAR FROM PREVIOUS CESAREAN DELIVERY: ICD-10-CM

## 2022-10-25 DIAGNOSIS — Z98.891 HISTORY OF CESAREAN DELIVERY: ICD-10-CM

## 2022-10-25 DIAGNOSIS — O09.522 MULTIGRAVIDA OF ADVANCED MATERNAL AGE IN SECOND TRIMESTER: ICD-10-CM

## 2022-10-25 DIAGNOSIS — O23.42 URINARY TRACT INFECTION IN MOTHER DURING SECOND TRIMESTER OF PREGNANCY: ICD-10-CM

## 2022-10-25 DIAGNOSIS — Z36.89 ENCOUNTER FOR OTHER SPECIFIED ANTENATAL SCREENING: Primary | ICD-10-CM

## 2022-10-25 LAB
SL AMB  POCT GLUCOSE, UA: NORMAL
SL AMB POCT URINE PROTEIN: NORMAL

## 2022-10-25 NOTE — PROGRESS NOTES
Routine Prenatal Visit  [address] [facility], [address], [facility], [address] [facility]    Assessment/Plan:  Tete Larios is a 30y o  year old  at 25w3d who presents for routine prenatal visit  1  Encounter for other specified  screening  -     Glucose, 1H PG; Future  -     CBC; Future  -     RPR (MONITOR) W/REFL TITER (REFL); Future  -     Glucose, 1H PG  -     CBC  -     RPR (MONITOR) W/REFL TITER (REFL)    2  24 weeks gestation of pregnancy  -     POCT urine dip    3  Urinary tract infection in mother during second trimester of pregnancy    4  20 weeks gestation of pregnancy    5  Multigravida of advanced maternal age in second trimester    6  History of  delivery    7  Maternal care due to low transverse uterine scar from previous  delivery    + fm no utcx    No leaking of fluid  Desires repeat   C/S    28 week lab slip given  Pt verbalized and understanding of instructions  No further   S+S Of  UTI  Diet dw pt  Subjective:     CC: Prenatal care    Aurelia Walsh is a 30 y o   female who presents for routine prenatal care at 24w1d  Pregnancy ROS: no  leakage of fluid, pelvic pain, or vaginal bleeding   + fetal movement      The following portions of the patient's history were reviewed and updated as appropriate: allergies, current medications, past family history, past medical history, obstetric history, gynecologic history, past social history, past surgical history and problem list       Objective:  /74 (BP Location: Left arm, Patient Position: Sitting, Cuff Size: Standard)   Ht 5' 3" (1 6 m)   Wt 71 7 kg (158 lb)   LMP 2022 (Exact Date)   BMI 27 99 kg/m²   Pregravid Weight/BMI: 67 6 kg (149 lb) (BMI 26 40)  Current Weight: 71 7 kg (158 lb)   Total Weight Gain: 4 082 kg (9 lb)   Pre- Vitals    Flowsheet Row Most Recent Value   Prenatal Assessment    Fetal Heart Rate 138-142   Fundal Height (cm) 24 cm   Movement Present   Prenatal Vitals    Blood Pressure 110/74   Weight - Scale 71 7 kg (158 lb)   Urine Albumin/Glucose    Dilation/Effacement/Station    Vaginal Drainage    Draining Fluid No   Edema    LLE Edema None   RLE Edema None   Facial Edema None           General: Well appearing, no distress  Respiratory: Unlabored breathing  Cardiovascular: Regular rate  Abdomen: Soft, gravid, nontender  Fundal Height: Appropriate for gestational age  Extremities: Warm and well perfused  Non tender

## 2022-10-26 ENCOUNTER — TELEPHONE (OUTPATIENT)
Dept: PERINATAL CARE | Facility: OTHER | Age: 37
End: 2022-10-26

## 2022-10-26 NOTE — TELEPHONE ENCOUNTER
I spoke with pt regarding her appt on 12/19 being changed from [facility] to the [facility], pt was ok with the change

## 2022-11-17 ENCOUNTER — ROUTINE PRENATAL (OUTPATIENT)
Dept: OBGYN CLINIC | Facility: CLINIC | Age: 37
End: 2022-11-17

## 2022-11-17 VITALS — BODY MASS INDEX: 28.87 KG/M2 | DIASTOLIC BLOOD PRESSURE: 70 MMHG | WEIGHT: 163 LBS | SYSTOLIC BLOOD PRESSURE: 110 MMHG

## 2022-11-17 DIAGNOSIS — R30.0 BURNING WITH URINATION: ICD-10-CM

## 2022-11-17 DIAGNOSIS — R35.0 URINARY FREQUENCY: Primary | ICD-10-CM

## 2022-11-17 LAB
SL AMB  POCT GLUCOSE, UA: NEGATIVE
SL AMB LEUKOCYTE ESTERASE,UA: NEGATIVE
SL AMB POCT BILIRUBIN,UA: NEGATIVE
SL AMB POCT BLOOD,UA: ABNORMAL
SL AMB POCT CLARITY,UA: CLEAR
SL AMB POCT COLOR,UA: YELLOW
SL AMB POCT KETONES,UA: NEGATIVE
SL AMB POCT NITRITE,UA: NEGATIVE
SL AMB POCT PH,UA: 5
SL AMB POCT SPECIFIC GRAVITY,UA: 1.01
SL AMB POCT URINE PROTEIN: NEGATIVE
SL AMB POCT UROBILINOGEN: 0.2

## 2022-11-17 RX ORDER — DOCOSAHEXAENOIC ACID 200 MG
CAPSULE ORAL
COMMUNITY

## 2022-11-21 PROBLEM — Z3A.28 28 WEEKS GESTATION OF PREGNANCY: Status: ACTIVE | Noted: 2022-08-04

## 2022-11-22 ENCOUNTER — ROUTINE PRENATAL (OUTPATIENT)
Dept: OBGYN CLINIC | Facility: CLINIC | Age: 37
End: 2022-11-22

## 2022-11-22 VITALS
WEIGHT: 161.6 LBS | DIASTOLIC BLOOD PRESSURE: 68 MMHG | SYSTOLIC BLOOD PRESSURE: 114 MMHG | BODY MASS INDEX: 28.63 KG/M2 | HEIGHT: 63 IN

## 2022-11-22 DIAGNOSIS — O09.523 MULTIGRAVIDA OF ADVANCED MATERNAL AGE IN THIRD TRIMESTER: ICD-10-CM

## 2022-11-22 DIAGNOSIS — O34.211 MATERNAL CARE DUE TO LOW TRANSVERSE UTERINE SCAR FROM PREVIOUS CESAREAN DELIVERY: Primary | ICD-10-CM

## 2022-11-22 DIAGNOSIS — Z23 NEED FOR TDAP VACCINATION: ICD-10-CM

## 2022-11-22 DIAGNOSIS — Z3A.28 28 WEEKS GESTATION OF PREGNANCY: ICD-10-CM

## 2022-11-22 DIAGNOSIS — Z23 FLU VACCINE NEED: ICD-10-CM

## 2022-11-22 PROBLEM — Z30.09 STERILIZATION CONSULT: Status: ACTIVE | Noted: 2022-11-22

## 2022-11-22 LAB
SL AMB  POCT GLUCOSE, UA: NEGATIVE
SL AMB POCT URINE PROTEIN: NEGATIVE

## 2022-11-22 NOTE — PATIENT INSTRUCTIONS
- Continue prenatal vitamins and all prescribed medications  - Try to drink 64 oz of water or other non-caffeinated fluids daily    - Fetal kick counts (to be done daily or if note a decrease in fetal movement):  in a quiet place, after a meal or drinking something sweet, lie on your side and count movements  Should get 10 movements in 2 hours  Call office if less than this  - Call office if leaking of fluid, bleeding, contractions >5-6/hour which don't decrease with rest, oral hydration, or emptying bladder, or decreased fetal movement  For constipation you can take a stool softener - Colace (Docusate sodium) 100mg 1-2x/day (available over the counter)

## 2022-11-22 NOTE — ASSESSMENT & PLAN NOTE
Patient wishes permanent sterilization with repeat LTCS  Discussed we could do this at her  with a bilateral salpingectomy - removing both fallopian tubes  She understands this is permanent  Currently she has no insurance and is "self pay" but states is working on insurance  At next visit will have sign MA-31 form in case needed at time of sterilization

## 2022-11-22 NOTE — PROGRESS NOTES
Routine Prenatal Visit  [address] [facility], [address], [facility], [address] [facility]    Assessment/Plan:  Serenity Cobian is a 30y o  year old  at 29w1d who presents for routine prenatal visit  Used Lorenzo Terry ([id number]) - [employer] , for visit today  1  Maternal care due to low transverse uterine scar from previous  delivery  Assessment & Plan:  Message to Surgical Scheduler to call pt to schedule repeat LTCS  2  Multigravida of advanced maternal age in third trimester    3  Flu vaccine need  -     influenza vaccine, quadrivalent, 0 5 mL, preservative-free, for adult and pediatric patients 6 mos+ (AFLURIA, FLUARIX, FLULAVAL, FLUZONE)    4  Need for Tdap vaccination  -     Tdap Vaccine greater than or equal to 8yo    5  28 weeks gestation of pregnancy  Assessment & Plan:  Has orders for 28 week labs - planning to go on  to lab  Adacel and flu vaccine given today  Orders:  -     POCT urine dip      Next OB Visit 2 weeks  Subjective:     CC: Prenatal care    Aurelia Sainirick Santana Walsh is a 30 y o   female who presents for routine prenatal care at 28w1d  Pregnancy ROS: no leakage of fluid, pelvic pain, or vaginal bleeding  normal fetal movement  Some pressure when walking but not contractions per pt  Discussed  labor precautions and consider pregnancy belt      The following portions of the patient's history were reviewed and updated as appropriate: allergies, current medications, past family history, past medical history, obstetric history, gynecologic history, past social history, past surgical history and problem list       Objective:  /68 (BP Location: Right arm, Patient Position: Sitting, Cuff Size: Standard)   Ht 5' 3" (1 6 m)   Wt 73 3 kg (161 lb 9 6 oz)   LMP 2022 (Exact Date)   BMI 28 63 kg/m²   Pregravid Weight/BMI: 67 6 kg (149 lb) (BMI 26 40)  Current Weight: 73 3 kg (161 lb 9 6 oz)   Total Weight Gain: 5 715 kg (12 lb 9 6 oz)   Pre-Oswald Vitals    Flowsheet Row Most Recent Value   Prenatal Assessment    Fetal Heart Rate 155   Fundal Height (cm) 28 cm   Movement Present   Prenatal Vitals    Blood Pressure 114/68   Weight - Scale 73 3 kg (161 lb 9 6 oz)   Urine Albumin/Glucose    Dilation/Effacement/Station    Vaginal Drainage    Edema    LLE Edema None   RLE Edema None           General: Well appearing, no distress  Abdomen: Soft, gravid, nontender  Fundal Height: Appropriate for gestational age  Extremities: Warm and well perfused  Non tender

## 2022-11-22 NOTE — ASSESSMENT & PLAN NOTE
Has orders for 28 week labs - planning to go on Saturday 11/26 to lab  Adacel and flu vaccine given today

## 2022-11-23 ENCOUNTER — TELEPHONE (OUTPATIENT)
Dept: OBGYN CLINIC | Facility: CLINIC | Age: 37
End: 2022-11-23

## 2022-11-23 NOTE — TELEPHONE ENCOUNTER
----- Message from Sujatha Church MD sent at 11/22/2022  2:15 PM EST -----  Regarding: repeat LTCS and bilateral salpingectomy  Please call pt to scheduled LTCS and salpingectomy at 39 weeks  Pt is Swazi speaking

## 2022-11-26 PROBLEM — O23.42 URINARY TRACT INFECTION IN MOTHER DURING SECOND TRIMESTER OF PREGNANCY: Status: RESOLVED | Noted: 2022-08-17 | Resolved: 2022-11-26

## 2022-11-26 LAB
EXTERNAL HEMATOCRIT: 33.8 %
EXTERNAL HEMOGLOBIN: 11.8 G/DL
EXTERNAL PLATELET COUNT: 353 K/ÂΜL
EXTERNAL SYPHILIS RPR SCREEN: NORMAL

## 2022-11-27 DIAGNOSIS — O99.810 ABNORMAL MATERNAL GLUCOSE TOLERANCE, ANTEPARTUM: Primary | ICD-10-CM

## 2022-11-28 ENCOUNTER — TELEPHONE (OUTPATIENT)
Dept: OBGYN CLINIC | Facility: CLINIC | Age: 37
End: 2022-11-28

## 2022-11-28 LAB
ERYTHROCYTE [DISTWIDTH] IN BLOOD BY AUTOMATED COUNT: 12 % (ref 11–15)
GLUCOSE 1H P 50 G GLC PO SERPL-MCNC: 158 MG/DL
HCT VFR BLD AUTO: 33.8 % (ref 35–45)
HGB BLD-MCNC: 11.8 G/DL (ref 11.7–15.5)
MCH RBC QN AUTO: 32.2 PG (ref 27–33)
MCHC RBC AUTO-ENTMCNC: 34.9 G/DL (ref 32–36)
MCV RBC AUTO: 92.3 FL (ref 80–100)
PLATELET # BLD AUTO: 353 THOUSAND/UL (ref 140–400)
PMV BLD REES-ECKER: 10 FL (ref 7.5–12.5)
RBC # BLD AUTO: 3.66 MILLION/UL (ref 3.8–5.1)
RPR SER QL: NORMAL
WBC # BLD AUTO: 9.1 THOUSAND/UL (ref 3.8–10.8)

## 2022-11-28 NOTE — TELEPHONE ENCOUNTER
TC to pt  [facility]    Bushra Lisa  [id number]  Pt is aware that she needs a 3 hour  Glucose   She must fast for  8-10 hours prior  Order was sent to [facility]   Initial screen I elevated  Low  Carbohydrate diet   Pt will have the fasting done and  Given the  Glucose  Drink  Blood will be drawn at  1, 2 and 3 hours  2 out of the 4 must be elevated for a diagnosis of  GDM    + fetal movement  Fu  After results are in  Questions answered  Results will be back in 2 d

## 2022-11-29 LAB
GLUCOSE 1H P GLC SERPL-MCNC: 154 MG/DL
GLUCOSE 2H P 75 G GLC PO SERPL-MCNC: 131 MG/DL
GLUCOSE 3H P 100 G GLC PO SERPL-MCNC: 104 MG/DL

## 2022-11-30 LAB
GLUCOSE 1H P CHAL SERPL-MCNC: 154 MG/DL
GLUCOSE 2H P CHAL SERPL-MCNC: 131 MG/DL
GLUCOSE 3H P 100 G GLC PO SERPL-MCNC: 104 MG/DL
GLUCOSE P FAST SERPL-MCNC: 74 MG/DL (ref 65–94)

## 2022-12-07 ENCOUNTER — ROUTINE PRENATAL (OUTPATIENT)
Dept: OBGYN CLINIC | Facility: CLINIC | Age: 37
End: 2022-12-07

## 2022-12-07 VITALS
WEIGHT: 166.8 LBS | HEIGHT: 63 IN | SYSTOLIC BLOOD PRESSURE: 102 MMHG | BODY MASS INDEX: 29.55 KG/M2 | DIASTOLIC BLOOD PRESSURE: 74 MMHG

## 2022-12-07 DIAGNOSIS — R30.0 DYSURIA: ICD-10-CM

## 2022-12-07 DIAGNOSIS — O23.13: Primary | ICD-10-CM

## 2022-12-07 DIAGNOSIS — Z3A.30 30 WEEKS GESTATION OF PREGNANCY: ICD-10-CM

## 2022-12-07 LAB
SL AMB  POCT GLUCOSE, UA: NEGATIVE
SL AMB LEUKOCYTE ESTERASE,UA: NEGATIVE
SL AMB POCT BILIRUBIN,UA: NEGATIVE
SL AMB POCT BLOOD,UA: ABNORMAL
SL AMB POCT KETONES,UA: NEGATIVE
SL AMB POCT NITRITE,UA: NEGATIVE
SL AMB POCT PH,UA: 6
SL AMB POCT SPECIFIC GRAVITY,UA: 1
SL AMB POCT URINE PROTEIN: NEGATIVE
SL AMB POCT UROBILINOGEN: 0.2

## 2022-12-07 RX ORDER — NITROFURANTOIN 25; 75 MG/1; MG/1
100 CAPSULE ORAL 2 TIMES DAILY
Qty: 14 CAPSULE | Refills: 0 | Status: SHIPPED | OUTPATIENT
Start: 2022-12-07 | End: 2022-12-14

## 2022-12-07 NOTE — PROGRESS NOTES
Routine Prenatal Visit  [facility], [address]    Assessment/Plan:  Savana Francisco is a 30y o  year old  at 30w2d who presents for routine prenatal visit  1  Acute cystitis in pregnancy, antepartum, third trimester  Assessment & Plan: Will send urine for culture  Script for Macrobid 100mg BID x 7 days sent to pharmacy  2  30 weeks gestation of pregnancy  Assessment & Plan:  Reviewed MA 31 form in length with help from [employer]   Patient understands sterilization is permanent and non-reversible  [place] MA31 form provided to patient for review  Both [place] and [place] version signed by patient and provider  Continue routine prenatal care  Return to office in 2 weeks for ob check  Orders:  -     POCT urine dip    3  Dysuria  -     Urine culture  -     nitrofurantoin (MACROBID) 100 mg capsule; Take 1 capsule (100 mg total) by mouth 2 (two) times a day for 7 days      Next OB Visit 2 weeks  Subjective:     CC: Prenatal care    Aurelia Walsh is a 30 y o   female who presents for routine prenatal care at 30w2d  Pregnancy ROS: Good FM, reports started with burning on urination, frequency, and pelvic pressure yesterday  Denies blood in urine, fevers or flank pain  No N/V, HA, cramping, VB, LOF, edema, domestic violence, or smoking  Tolerating PNV  Patient desires repeat  with tubal ligation  [employer]  [id number] used for this visit       The following portions of the patient's history were reviewed and updated as appropriate: allergies, current medications, past family history, past medical history, obstetric history, gynecologic history, past social history, past surgical history and problem list       Objective:  /74 (BP Location: Left arm, Patient Position: Sitting, Cuff Size: Standard)   Ht 5' 3" (1 6 m)   Wt 75 7 kg (166 lb 12 8 oz)   LMP 2022 (Exact Date)   BMI 29 55 kg/m²   Pregravid Weight/BMI: 67 6 kg (149 lb) (BMI 26 40)  Current Weight: 75 7 kg (166 lb 12 8 oz)   Total Weight Gain: 8 074 kg (17 lb 12 8 oz)   Pre- Vitals    Flowsheet Row Most Recent Value   Prenatal Assessment    Fetal Heart Rate 151   Fundal Height (cm) 30 cm   Movement Present   Prenatal Vitals    Blood Pressure 102/74   Weight - Scale 75 7 kg (166 lb 12 8 oz)   Urine Albumin/Glucose    Dilation/Effacement/Station    Vaginal Drainage    Draining Fluid No   Edema    LLE Edema None   RLE Edema None   Facial Edema None           General: Well appearing, no distress  Abdomen: Soft, gravid, nontender  Fundal Height: Appropriate for gestational age  Extremities: Warm and well perfused  Non tender

## 2022-12-07 NOTE — ASSESSMENT & PLAN NOTE
Reviewed MA 31 form in length with help from [place]   Patient understands sterilization is permanent and non-reversible  [place] MA31 form provided to patient for review  Both [place] and [place] version signed by patient and provider  Continue routine prenatal care  Return to office in 2 weeks for ob check

## 2022-12-19 ENCOUNTER — TELEPHONE (OUTPATIENT)
Dept: PERINATAL CARE | Facility: CLINIC | Age: 37
End: 2022-12-19

## 2022-12-19 ENCOUNTER — ULTRASOUND (OUTPATIENT)
Facility: HOSPITAL | Age: 37
End: 2022-12-19

## 2022-12-19 VITALS
SYSTOLIC BLOOD PRESSURE: 100 MMHG | BODY MASS INDEX: 30.05 KG/M2 | WEIGHT: 169.6 LBS | DIASTOLIC BLOOD PRESSURE: 54 MMHG | HEIGHT: 63 IN

## 2022-12-19 DIAGNOSIS — Z3A.32 32 WEEKS GESTATION OF PREGNANCY: Primary | ICD-10-CM

## 2022-12-19 DIAGNOSIS — O09.523 MULTIGRAVIDA OF ADVANCED MATERNAL AGE IN THIRD TRIMESTER: ICD-10-CM

## 2022-12-19 NOTE — TELEPHONE ENCOUNTER
Called pt and let them know they did not need to schedule a follow-up appt with MFM per Dr Canales Poster

## 2022-12-19 NOTE — PROGRESS NOTES
[address]: Ms Lorelei Heller was seen today for fetal growth and followup missed anatomy ultrasound  See ultrasound report under "OB Procedures" tab     Please don't hesitate to contact our office with any concerns or questions   -Yamileth Holland MD

## 2022-12-19 NOTE — PATIENT INSTRUCTIONS
Thank you for choosing us for your  care today  If you have any questions about your ultrasound or care, please do not hesitate to contact us or your primary obstetrician  Some general instructions for your pregnancy are:    Protect against coronavirus: get vaccinated - pregnant women are increased risk of severe COVID  Notify your primary care doctor if you have any symptoms  Exercise: Aim for 22 minutes per day (150 minutes per week) of regular exercise  Walking is great! Nutrition: aim for calcium-rich and iron-rich foods as well as healthy sources of protein  Learn about Preeclampsia: preeclampsia is a common, serious high blood pressure complication in pregnancy  A blood pressure of 228KYXL (systolic or top number) or 22ZFPF (diastolic or bottom number) is not normal and needs evaluation by your doctor  Aspirin is sometimes prescribed in early pregnancy to prevent preeclampsia in women with risk factors - ask your obstetrician if you should be on this medication  If you smoke, try to reduce how many cigarettes you smoke or try to quit completely  Do not vape  Other warning signs to watch out for in pregnancy or postpartum: chest pain, obstructed breathing or shortness of breath, seizures, thoughts of hurting yourself or your baby, bleeding, a painful or swollen leg, fever, or headache (see AWHONN POST-BIRTH Warning Signs campaign)  If these happen call 911  Itching is also not normal in pregnancy and if you experience this, especially over your hands and feet, potentially worse at night, notify your doctors

## 2022-12-20 ENCOUNTER — TELEPHONE (OUTPATIENT)
Dept: OBGYN CLINIC | Facility: CLINIC | Age: 37
End: 2022-12-20

## 2022-12-20 DIAGNOSIS — O34.211 MATERNAL CARE DUE TO LOW TRANSVERSE UTERINE SCAR FROM PREVIOUS CESAREAN DELIVERY: Primary | ICD-10-CM

## 2022-12-20 DIAGNOSIS — Z98.891 HISTORY OF CESAREAN DELIVERY: ICD-10-CM

## 2022-12-20 DIAGNOSIS — Z3A.30 30 WEEKS GESTATION OF PREGNANCY: ICD-10-CM

## 2022-12-20 DIAGNOSIS — O09.523 MULTIGRAVIDA OF ADVANCED MATERNAL AGE IN THIRD TRIMESTER: ICD-10-CM

## 2022-12-20 DIAGNOSIS — Z30.09 STERILIZATION CONSULT: ICD-10-CM

## 2022-12-20 NOTE — TELEPHONE ENCOUNTER
Georgina called that she had a fever last night  She took Tylenol last night and is feeling better this morning  She could not inform nurse of temperature of fever  She denied any other symptoms  Informed pt to due COVID test, continue taking Tylenol for fever,eat a bland diet and increase fluid intake estephania  Yarelis  Call back with any other concerns

## 2022-12-23 ENCOUNTER — ROUTINE PRENATAL (OUTPATIENT)
Dept: OBGYN CLINIC | Facility: CLINIC | Age: 37
End: 2022-12-23

## 2022-12-23 ENCOUNTER — TELEPHONE (OUTPATIENT)
Dept: OBGYN CLINIC | Facility: CLINIC | Age: 37
End: 2022-12-23

## 2022-12-23 VITALS
WEIGHT: 168.6 LBS | BODY MASS INDEX: 29.88 KG/M2 | SYSTOLIC BLOOD PRESSURE: 112 MMHG | HEIGHT: 63 IN | DIASTOLIC BLOOD PRESSURE: 78 MMHG

## 2022-12-23 DIAGNOSIS — Z98.891 HISTORY OF CESAREAN DELIVERY: ICD-10-CM

## 2022-12-23 DIAGNOSIS — O09.523 MULTIGRAVIDA OF ADVANCED MATERNAL AGE IN THIRD TRIMESTER: ICD-10-CM

## 2022-12-23 DIAGNOSIS — Z30.09 STERILIZATION CONSULT: ICD-10-CM

## 2022-12-23 DIAGNOSIS — Z3A.32 32 WEEKS GESTATION OF PREGNANCY: ICD-10-CM

## 2022-12-23 DIAGNOSIS — O98.513 COVID-19 AFFECTING PREGNANCY IN THIRD TRIMESTER: Primary | ICD-10-CM

## 2022-12-23 DIAGNOSIS — U07.1 COVID-19 AFFECTING PREGNANCY IN THIRD TRIMESTER: Primary | ICD-10-CM

## 2022-12-23 DIAGNOSIS — O34.211 MATERNAL CARE DUE TO LOW TRANSVERSE UTERINE SCAR FROM PREVIOUS CESAREAN DELIVERY: ICD-10-CM

## 2022-12-23 LAB
SL AMB  POCT GLUCOSE, UA: NEGATIVE
SL AMB POCT URINE PROTEIN: NEGATIVE

## 2022-12-23 NOTE — PROGRESS NOTES
Routine Prenatal Visit  [address] [facility], [address], [facility] [address] [facility] [address]    Assessment/Plan:  Omar Mora is a 30y o  year old  at 32w4d who presents for routine prenatal visit  1  COVID-19 affecting pregnancy in third trimester  Assessment & Plan:  Mostly recovered at this time  Reviewed s/s to go to ER with  2  Maternal care due to low transverse uterine scar from previous  delivery    3  Multigravida of advanced maternal age in third trimester    4  History of  delivery    5  Sterilization consult    6  32 weeks gestation of pregnancy  -     POCT urine dip      Next OB Visit 2 weeks  Subjective:     CC: Prenatal care  Yodio  [address] [facility] used for this visit  Jareth Navas is a 30 y o   female who presents for routine prenatal care at 32w4d  Pregnancy ROS: Good Fm, small amount of vomiting with recent COVID infection, has improved  Reports cramping, mild, no vaginal bleeding  Improves with rest  Denies vaginal discharge, odors, itching  Denies bowel or bladder issues  No N/V, HA, LOF, edema, domestic violence, or smoking  Tolerating PNV  Patient testing positive for COVID 6 days ago, symptoms started on 2022 with fever  Reports fever broke 2 days  Patient is vaccinated x 2 Pfizer vaccines  Still has residual cough but feeling much better       The following portions of the patient's history were reviewed and updated as appropriate: allergies, current medications, past family history, past medical history, obstetric history, gynecologic history, past social history, past surgical history and problem list       Objective:  /78   Ht 5' 3" (1 6 m)   Wt 76 5 kg (168 lb 9 6 oz)   LMP 2022 (Exact Date)   BMI 29 87 kg/m²   Pregravid Weight/BMI: 67 6 kg (149 lb) (BMI 26 40)  Current Weight: 76 5 kg (168 lb 9 6 oz)   Total Weight Gain: 8 891 kg (19 lb 9 6 oz)   Pre-Oswald Vitals Flowsheet Row Most Recent Value   Prenatal Assessment    Movement Present   Prenatal Vitals    Blood Pressure 112/78   Weight - Scale 76 5 kg (168 lb 9 6 oz)   Urine Albumin/Glucose    Dilation/Effacement/Station    Vaginal Drainage    Edema    LLE Edema None   RLE Edema None   Facial Edema None           General: Well appearing, no distress  Abdomen: Soft, gravid, nontender  Fundal Height: Appropriate for gestational age  Extremities: Warm and well perfused  Non tender

## 2022-12-23 NOTE — PROGRESS NOTES
[address] [facility]  Positive for COVID 6 days   Symptoms started Symptoms started on Sunday with fever  Still has residual cough  Last fever 2 days ago  Patient is vaccinated  Good FM,  Small amount of vomit, improved  Cramping, no vaginal bleeding  Like mild menstrual cramps, mild better with rest    Denies vaginal discharge

## 2022-12-23 NOTE — TELEPHONE ENCOUNTER
Pt was seen in the office today and submitted breast pump order request  Pt did not  off preferred breast pump  Pt will call back with breast pump choice  Breast pump order in nurses bin (yellow folder)

## 2023-01-04 ENCOUNTER — ROUTINE PRENATAL (OUTPATIENT)
Dept: OBGYN CLINIC | Facility: CLINIC | Age: 38
End: 2023-01-04

## 2023-01-04 VITALS
BODY MASS INDEX: 29.41 KG/M2 | DIASTOLIC BLOOD PRESSURE: 84 MMHG | SYSTOLIC BLOOD PRESSURE: 130 MMHG | HEIGHT: 63 IN | WEIGHT: 166 LBS

## 2023-01-04 DIAGNOSIS — O09.523 MULTIGRAVIDA OF ADVANCED MATERNAL AGE IN THIRD TRIMESTER: ICD-10-CM

## 2023-01-04 DIAGNOSIS — O34.211 MATERNAL CARE DUE TO LOW TRANSVERSE UTERINE SCAR FROM PREVIOUS CESAREAN DELIVERY: Primary | ICD-10-CM

## 2023-01-04 DIAGNOSIS — Z3A.34 34 WEEKS GESTATION OF PREGNANCY: ICD-10-CM

## 2023-01-04 DIAGNOSIS — Z30.09 STERILIZATION CONSULT: ICD-10-CM

## 2023-01-04 LAB
SL AMB  POCT GLUCOSE, UA: ABNORMAL
SL AMB POCT URINE PROTEIN: ABNORMAL

## 2023-01-04 NOTE — ASSESSMENT & PLAN NOTE
Again confirms desire for permanent sterilization  Reviewed procedure and bilateral salpingectomy is recommended for risk reduction

## 2023-01-04 NOTE — PROGRESS NOTES
Routine Prenatal Visit  [address] [facility], [address], [facility], [address] [facility]    Assessment/Plan:  Audrey Rae is a 30y o  year old  at 34w2d who presents for routine prenatal visit  1  Maternal care due to low transverse uterine scar from previous  delivery    2  Multigravida of advanced maternal age in third trimester    3  34 weeks gestation of pregnancy  -     POCT urine dip    4  Sterilization consult  Assessment & Plan:  Again confirms desire for permanent sterilization  Reviewed procedure and bilateral salpingectomy is recommended for risk reduction  Subjective:     CC: Prenatal care    Aurelia Walsh is a 30 y o   female who presents for routine prenatal care at 34w2d  Pregnancy ROS: no leakage of fluid, pelvic pain, or vaginal bleeding  normal fetal movement  The following portions of the patient's history were reviewed and updated as appropriate: allergies, current medications, past family history, past medical history, obstetric history, gynecologic history, past social history, past surgical history and problem list       Objective:  /84 (BP Location: Right arm, Patient Position: Sitting, Cuff Size: Standard)   Ht 5' 3" (1 6 m)   Wt 75 3 kg (166 lb)   LMP 2022 (Exact Date)   BMI 29 41 kg/m²   Pregravid Weight/BMI: 67 6 kg (149 lb) (BMI 26 40)  Current Weight: 75 3 kg (166 lb)   Total Weight Gain: 7 711 kg (17 lb)   Pre- Vitals    Flowsheet Row Most Recent Value   Prenatal Assessment    Fetal Heart Rate 140   Fundal Height (cm) 34 cm   Movement Present   Presentation Vertex   Prenatal Vitals    Blood Pressure 130/84   Weight - Scale 75 3 kg (166 lb)   Urine Albumin/Glucose    Dilation/Effacement/Station    Vaginal Drainage    Edema            General: Well appearing, no distress  Respiratory: Unlabored breathing  Cardiovascular: Regular rate    Abdomen: Soft, gravid, nontender  Fundal Height: Appropriate for gestational age  Extremities: Warm and well perfused  Non tender

## 2023-01-06 NOTE — PROGRESS NOTES
[facility]  [facility] [address]    Assessment/Plan:  Lanie Farah is a 30y o  year old  at 27w3d who presents for routine prenatal visit  1  Urinary frequency  Comments:  c/o frequency and burning  Urine dip is negative  Will send culture  Orders:  -     POCT urine dip  -     Urine culture    2  Burning with urination  -     POCT urine dip  -     Urine culture          Subjective:     CC: Prenatal care    Aurelia Walsh is a 30 y o   female who presents for routine prenatal care at 27w3d  Pregnancy ROS: no leakage of fluid, pelvic pain, or vaginal bleeding  normal fetal movement  The following portions of the patient's history were reviewed and updated as appropriate: allergies, current medications, past family history, past medical history, obstetric history, gynecologic history, past social history, past surgical history and problem list       Objective:  /70   Wt 73 9 kg (163 lb)   LMP 2022 (Exact Date)   BMI 28 87 kg/m²   Pregravid Weight/BMI: 67 6 kg (149 lb) (BMI 26 40)  Current Weight: 73 9 kg (163 lb)   Total Weight Gain: 6 35 kg (14 lb)   Pre-Oswald Vitals    Flowsheet Row Most Recent Value   Prenatal Assessment    Fetal Heart Rate 150   Fundal Height (cm) 28 cm   Movement Present   Prenatal Vitals    Blood Pressure 110/70   Weight - Scale 73 9 kg (163 lb)   Urine Albumin/Glucose    Dilation/Effacement/Station    Vaginal Drainage    Edema            General: Well appearing, no distress  Respiratory: Unlabored breathing  Cardiovascular: Regular rate  Abdomen: Soft, gravid, nontender  Fundal Height: Appropriate for gestational age  Extremities: Warm and well perfused  Non tender  No

## 2023-01-18 ENCOUNTER — ROUTINE PRENATAL (OUTPATIENT)
Dept: OBGYN CLINIC | Facility: CLINIC | Age: 38
End: 2023-01-18

## 2023-01-18 VITALS
HEIGHT: 63 IN | SYSTOLIC BLOOD PRESSURE: 124 MMHG | BODY MASS INDEX: 29.41 KG/M2 | WEIGHT: 166 LBS | DIASTOLIC BLOOD PRESSURE: 84 MMHG

## 2023-01-18 DIAGNOSIS — Z3A.36 36 WEEKS GESTATION OF PREGNANCY: ICD-10-CM

## 2023-01-18 DIAGNOSIS — Z36.85 ENCOUNTER FOR ANTENATAL SCREENING FOR STREPTOCOCCUS B: ICD-10-CM

## 2023-01-18 DIAGNOSIS — O09.523 MULTIGRAVIDA OF ADVANCED MATERNAL AGE IN THIRD TRIMESTER: ICD-10-CM

## 2023-01-18 DIAGNOSIS — O34.211 MATERNAL CARE DUE TO LOW TRANSVERSE UTERINE SCAR FROM PREVIOUS CESAREAN DELIVERY: Primary | ICD-10-CM

## 2023-01-18 LAB
SL AMB  POCT GLUCOSE, UA: NORMAL
SL AMB POCT URINE PROTEIN: NORMAL

## 2023-01-18 NOTE — PROGRESS NOTES
Routine Prenatal Visit  [address] [facility], [address], [facility], [address] [facility]    Assessment/Plan:  Gerri Brewer is a 30y o  year old  at 36w2d who presents for routine prenatal visit  1  Maternal care due to low transverse uterine scar from previous  delivery  Assessment & Plan:  Reviewed procedure with patient and   Confirms desire for permanent sterilzation  2  Multigravida of advanced maternal age in third trimester    3  36 weeks gestation of pregnancy  -     POCT urine dip    4  Encounter for  screening for Streptococcus B  -     Strep Gp B Culture        Subjective:     CC: Prenatal care    Aurelia Walsh is a 30 y o   female who presents for routine prenatal care at 36w2d  Pregnancy ROS: no leakage of fluid, pelvic pain, or vaginal bleeding  normal fetal movement  The following portions of the patient's history were reviewed and updated as appropriate: allergies, current medications, past family history, past medical history, obstetric history, gynecologic history, past social history, past surgical history and problem list       Objective:  /84 (BP Location: Left arm, Patient Position: Sitting, Cuff Size: Standard)   Ht 5' 3" (1 6 m)   Wt 75 3 kg (166 lb)   LMP 2022 (Exact Date)   BMI 29 41 kg/m²   Pregravid Weight/BMI: 67 6 kg (149 lb) (BMI 26 40)  Current Weight: 75 3 kg (166 lb)   Total Weight Gain: 7 711 kg (17 lb)   Pre-Oswald Vitals    Flowsheet Row Most Recent Value   Prenatal Assessment    Fetal Heart Rate 140   Fundal Height (cm) 36 cm   Movement Present   Presentation Vertex   Prenatal Vitals    Blood Pressure 124/84   Weight - Scale 75 3 kg (166 lb)   Urine Albumin/Glucose    Dilation/Effacement/Station    Vaginal Drainage    Edema            General: Well appearing, no distress  Respiratory: Unlabored breathing  Cardiovascular: Regular rate    Abdomen: Soft, gravid, nontender  Fundal Height: Appropriate for gestational age  Extremities: Warm and well perfused  Non tender

## 2023-01-23 ENCOUNTER — ANESTHESIA (INPATIENT)
Dept: LABOR AND DELIVERY | Facility: HOSPITAL | Age: 38
End: 2023-01-23

## 2023-01-23 ENCOUNTER — ANESTHESIA EVENT (INPATIENT)
Dept: LABOR AND DELIVERY | Facility: HOSPITAL | Age: 38
End: 2023-01-23

## 2023-01-23 ENCOUNTER — TELEPHONE (OUTPATIENT)
Dept: OBGYN CLINIC | Facility: CLINIC | Age: 38
End: 2023-01-23

## 2023-01-23 ENCOUNTER — HOSPITAL ENCOUNTER (INPATIENT)
Facility: HOSPITAL | Age: 38
LOS: 4 days | Discharge: HOME | End: 2023-01-27
Attending: OBSTETRICS & GYNECOLOGY | Admitting: OBSTETRICS & GYNECOLOGY

## 2023-01-23 DIAGNOSIS — Z98.891 HISTORY OF CESAREAN DELIVERY: ICD-10-CM

## 2023-01-23 DIAGNOSIS — R10.9 ABDOMINAL PAIN IN PREGNANCY, THIRD TRIMESTER: Primary | ICD-10-CM

## 2023-01-23 DIAGNOSIS — O14.03: ICD-10-CM

## 2023-01-23 DIAGNOSIS — O26.893 ABDOMINAL PAIN IN PREGNANCY, THIRD TRIMESTER: Primary | ICD-10-CM

## 2023-01-23 PROBLEM — R03.0 ELEVATED BLOOD PRESSURE READING: Status: ACTIVE | Noted: 2023-01-23

## 2023-01-23 LAB
ABO GROUP BLD: NORMAL
ALBUMIN SERPL BCP-MCNC: 2.3 G/DL (ref 3.5–5)
ALP SERPL-CCNC: 406 U/L (ref 46–116)
ALT SERPL W P-5'-P-CCNC: 23 U/L (ref 12–78)
ANION GAP SERPL CALCULATED.3IONS-SCNC: 10 MMOL/L (ref 4–13)
AST SERPL W P-5'-P-CCNC: 22 U/L (ref 5–45)
BASE EXCESS BLDCOV CALC-SCNC: -3.3 MMOL/L (ref 1–9)
BASOPHILS # BLD AUTO: 0.06 THOUSANDS/ÂΜL (ref 0–0.1)
BASOPHILS NFR BLD AUTO: 1 % (ref 0–1)
BILIRUB SERPL-MCNC: 0.1 MG/DL (ref 0.2–1)
BLD GP AB SCN SERPL QL: NEGATIVE
BUN SERPL-MCNC: 11 MG/DL (ref 5–25)
CALCIUM ALBUM COR SERPL-MCNC: 10.2 MG/DL (ref 8.3–10.1)
CALCIUM SERPL-MCNC: 8.8 MG/DL (ref 8.3–10.1)
CHLORIDE SERPL-SCNC: 106 MMOL/L (ref 96–108)
CO2 SERPL-SCNC: 23 MMOL/L (ref 21–32)
CREAT SERPL-MCNC: 0.77 MG/DL (ref 0.6–1.3)
CREAT UR-MCNC: 35 MG/DL
EOSINOPHIL # BLD AUTO: 0.11 THOUSAND/ÂΜL (ref 0–0.61)
EOSINOPHIL NFR BLD AUTO: 1 % (ref 0–6)
ERYTHROCYTE [DISTWIDTH] IN BLOOD BY AUTOMATED COUNT: 12.2 % (ref 11.6–15.1)
GFR SERPL CREATININE-BSD FRML MDRD: 100 ML/MIN/1.73SQ M
GLUCOSE SERPL-MCNC: 69 MG/DL (ref 65–140)
HCO3 BLDCOV-SCNC: 22.6 MMOL/L (ref 12.2–28.6)
HCT VFR BLD AUTO: 37.8 % (ref 34.8–46.1)
HGB BLD-MCNC: 12.8 G/DL (ref 11.5–15.4)
IMM GRANULOCYTES # BLD AUTO: 0.03 THOUSAND/UL (ref 0–0.2)
IMM GRANULOCYTES NFR BLD AUTO: 0 % (ref 0–2)
LYMPHOCYTES # BLD AUTO: 3.01 THOUSANDS/ÂΜL (ref 0.6–4.47)
LYMPHOCYTES NFR BLD AUTO: 33 % (ref 14–44)
MCH RBC QN AUTO: 31.1 PG (ref 26.8–34.3)
MCHC RBC AUTO-ENTMCNC: 33.9 G/DL (ref 31.4–37.4)
MCV RBC AUTO: 92 FL (ref 82–98)
MONOCYTES # BLD AUTO: 0.74 THOUSAND/ÂΜL (ref 0.17–1.22)
MONOCYTES NFR BLD AUTO: 8 % (ref 4–12)
NEUTROPHILS # BLD AUTO: 5.11 THOUSANDS/ÂΜL (ref 1.85–7.62)
NEUTS SEG NFR BLD AUTO: 57 % (ref 43–75)
NRBC BLD AUTO-RTO: 0 /100 WBCS
OXYHGB MFR BLDCOV: 42.4 %
PCO2 BLDCOV: 43.5 MM HG (ref 27–43)
PH BLDCOV: 7.33 [PH] (ref 7.19–7.49)
PLATELET # BLD AUTO: 273 THOUSANDS/UL (ref 149–390)
PMV BLD AUTO: 10.9 FL (ref 8.9–12.7)
PO2 BLDCOV: 19.3 MM HG (ref 15–45)
POTASSIUM SERPL-SCNC: 4.2 MMOL/L (ref 3.5–5.3)
PROT SERPL-MCNC: 6.7 G/DL (ref 6.4–8.4)
PROT UR-MCNC: 14 MG/DL
PROT/CREAT UR: 0.4 MG/G{CREAT} (ref 0–0.1)
RBC # BLD AUTO: 4.11 MILLION/UL (ref 3.81–5.12)
RH BLD: POSITIVE
SAO2 % BLDCOV: 10.7 ML/DL
SODIUM SERPL-SCNC: 139 MMOL/L (ref 135–147)
SPECIMEN EXPIRATION DATE: NORMAL
WBC # BLD AUTO: 9.06 THOUSAND/UL (ref 4.31–10.16)

## 2023-01-23 PROCEDURE — 0UT70ZZ RESECTION OF BILATERAL FALLOPIAN TUBES, OPEN APPROACH: ICD-10-PCS | Performed by: OBSTETRICS & GYNECOLOGY

## 2023-01-23 PROCEDURE — 4A1HXCZ MONITORING OF PRODUCTS OF CONCEPTION, CARDIAC RATE, EXTERNAL APPROACH: ICD-10-PCS | Performed by: OBSTETRICS & GYNECOLOGY

## 2023-01-23 RX ORDER — CEFAZOLIN SODIUM 2 G/50ML
2000 SOLUTION INTRAVENOUS ONCE
Status: DISCONTINUED | OUTPATIENT
Start: 2023-01-23 | End: 2023-01-23

## 2023-01-23 RX ORDER — TRISODIUM CITRATE DIHYDRATE AND CITRIC ACID MONOHYDRATE 500; 334 MG/5ML; MG/5ML
30 SOLUTION ORAL ONCE
Status: DISCONTINUED | OUTPATIENT
Start: 2023-01-23 | End: 2023-01-23 | Stop reason: SDUPTHER

## 2023-01-23 RX ORDER — OXYTOCIN/RINGER'S LACTATE 30/500 ML
PLASTIC BAG, INJECTION (ML) INTRAVENOUS CONTINUOUS PRN
Status: DISCONTINUED | OUTPATIENT
Start: 2023-01-23 | End: 2023-01-23

## 2023-01-23 RX ORDER — KETOROLAC TROMETHAMINE 30 MG/ML
INJECTION, SOLUTION INTRAMUSCULAR; INTRAVENOUS AS NEEDED
Status: DISCONTINUED | OUTPATIENT
Start: 2023-01-23 | End: 2023-01-23

## 2023-01-23 RX ORDER — FENTANYL CITRATE/PF 50 MCG/ML
25 SYRINGE (ML) INJECTION
Status: DISCONTINUED | OUTPATIENT
Start: 2023-01-23 | End: 2023-01-23

## 2023-01-23 RX ORDER — KETOROLAC TROMETHAMINE 30 MG/ML
30 INJECTION, SOLUTION INTRAMUSCULAR; INTRAVENOUS EVERY 6 HOURS
Status: DISPENSED | OUTPATIENT
Start: 2023-01-23 | End: 2023-01-24

## 2023-01-23 RX ORDER — TRISODIUM CITRATE DIHYDRATE AND CITRIC ACID MONOHYDRATE 500; 334 MG/5ML; MG/5ML
30 SOLUTION ORAL ONCE
Status: COMPLETED | OUTPATIENT
Start: 2023-01-23 | End: 2023-01-23

## 2023-01-23 RX ORDER — OXYCODONE HYDROCHLORIDE 5 MG/1
5 TABLET ORAL EVERY 4 HOURS PRN
Status: DISCONTINUED | OUTPATIENT
Start: 2023-01-23 | End: 2023-01-27 | Stop reason: HOSPADM

## 2023-01-23 RX ORDER — OXYTOCIN/RINGER'S LACTATE 30/500 ML
62.5 PLASTIC BAG, INJECTION (ML) INTRAVENOUS ONCE
Status: COMPLETED | OUTPATIENT
Start: 2023-01-23 | End: 2023-01-24

## 2023-01-23 RX ORDER — ONDANSETRON 2 MG/ML
4 INJECTION INTRAMUSCULAR; INTRAVENOUS EVERY 8 HOURS PRN
Status: DISCONTINUED | OUTPATIENT
Start: 2023-01-23 | End: 2023-01-23

## 2023-01-23 RX ORDER — HYDROMORPHONE HCL/PF 1 MG/ML
0.5 SYRINGE (ML) INJECTION
Status: DISCONTINUED | OUTPATIENT
Start: 2023-01-23 | End: 2023-01-23

## 2023-01-23 RX ORDER — SIMETHICONE 80 MG
80 TABLET,CHEWABLE ORAL 4 TIMES DAILY PRN
Status: DISCONTINUED | OUTPATIENT
Start: 2023-01-23 | End: 2023-01-27 | Stop reason: HOSPADM

## 2023-01-23 RX ORDER — CEFAZOLIN SODIUM 1 G/50ML
1000 SOLUTION INTRAVENOUS ONCE
Status: COMPLETED | OUTPATIENT
Start: 2023-01-23 | End: 2023-01-23

## 2023-01-23 RX ORDER — IBUPROFEN 600 MG/1
600 TABLET ORAL EVERY 6 HOURS PRN
Status: DISCONTINUED | OUTPATIENT
Start: 2023-01-24 | End: 2023-01-26

## 2023-01-23 RX ORDER — SODIUM CHLORIDE, SODIUM LACTATE, POTASSIUM CHLORIDE, CALCIUM CHLORIDE 600; 310; 30; 20 MG/100ML; MG/100ML; MG/100ML; MG/100ML
125 INJECTION, SOLUTION INTRAVENOUS CONTINUOUS
Status: DISCONTINUED | OUTPATIENT
Start: 2023-01-23 | End: 2023-01-24 | Stop reason: SDUPTHER

## 2023-01-23 RX ORDER — NALOXONE HYDROCHLORIDE 0.4 MG/ML
0.1 INJECTION, SOLUTION INTRAMUSCULAR; INTRAVENOUS; SUBCUTANEOUS
Status: ACTIVE | OUTPATIENT
Start: 2023-01-23 | End: 2023-01-24

## 2023-01-23 RX ORDER — FENTANYL CITRATE 50 UG/ML
INJECTION, SOLUTION INTRAMUSCULAR; INTRAVENOUS AS NEEDED
Status: DISCONTINUED | OUTPATIENT
Start: 2023-01-23 | End: 2023-01-23

## 2023-01-23 RX ORDER — ACETAMINOPHEN 325 MG/1
650 TABLET ORAL EVERY 6 HOURS SCHEDULED
Status: COMPLETED | OUTPATIENT
Start: 2023-01-23 | End: 2023-01-24

## 2023-01-23 RX ORDER — DOCUSATE SODIUM 100 MG/1
100 CAPSULE, LIQUID FILLED ORAL 2 TIMES DAILY
Status: DISCONTINUED | OUTPATIENT
Start: 2023-01-23 | End: 2023-01-27 | Stop reason: HOSPADM

## 2023-01-23 RX ORDER — ONDANSETRON 2 MG/ML
INJECTION INTRAMUSCULAR; INTRAVENOUS AS NEEDED
Status: DISCONTINUED | OUTPATIENT
Start: 2023-01-23 | End: 2023-01-23

## 2023-01-23 RX ORDER — MORPHINE SULFATE 1 MG/ML
INJECTION, SOLUTION EPIDURAL; INTRATHECAL; INTRAVENOUS AS NEEDED
Status: DISCONTINUED | OUTPATIENT
Start: 2023-01-23 | End: 2023-01-23

## 2023-01-23 RX ORDER — BUPIVACAINE HYDROCHLORIDE 7.5 MG/ML
INJECTION, SOLUTION INTRASPINAL AS NEEDED
Status: DISCONTINUED | OUTPATIENT
Start: 2023-01-23 | End: 2023-01-23

## 2023-01-23 RX ORDER — ONDANSETRON 2 MG/ML
4 INJECTION INTRAMUSCULAR; INTRAVENOUS EVERY 8 HOURS PRN
Status: DISCONTINUED | OUTPATIENT
Start: 2023-01-23 | End: 2023-01-27 | Stop reason: HOSPADM

## 2023-01-23 RX ADMIN — ONDANSETRON 4 MG: 2 INJECTION INTRAMUSCULAR; INTRAVENOUS at 16:22

## 2023-01-23 RX ADMIN — PHENYLEPHRINE HYDROCHLORIDE 30 MCG/MIN: 10 INJECTION, SOLUTION INTRAVENOUS at 16:15

## 2023-01-23 RX ADMIN — Medication 62.5 MILLI-UNITS/MIN: at 18:34

## 2023-01-23 RX ADMIN — Medication 200 MILLI-UNITS/MIN: at 16:37

## 2023-01-23 RX ADMIN — FENTANYL CITRATE 10 MCG: 50 INJECTION, SOLUTION INTRAMUSCULAR; INTRAVENOUS at 16:15

## 2023-01-23 RX ADMIN — KETOROLAC TROMETHAMINE 30 MG: 30 INJECTION, SOLUTION INTRAMUSCULAR; INTRAVENOUS at 16:40

## 2023-01-23 RX ADMIN — KETOROLAC TROMETHAMINE 30 MG: 30 INJECTION, SOLUTION INTRAMUSCULAR; INTRAVENOUS at 22:28

## 2023-01-23 RX ADMIN — DOCUSATE SODIUM 100 MG: 100 CAPSULE, LIQUID FILLED ORAL at 18:39

## 2023-01-23 RX ADMIN — CEFAZOLIN SODIUM 1000 MG: 1 SOLUTION INTRAVENOUS at 16:15

## 2023-01-23 RX ADMIN — SODIUM CHLORIDE, SODIUM LACTATE, POTASSIUM CHLORIDE, AND CALCIUM CHLORIDE 1000 ML: .6; .31; .03; .02 INJECTION, SOLUTION INTRAVENOUS at 15:01

## 2023-01-23 RX ADMIN — SODIUM CHLORIDE, SODIUM LACTATE, POTASSIUM CHLORIDE, AND CALCIUM CHLORIDE: .6; .31; .03; .02 INJECTION, SOLUTION INTRAVENOUS at 16:03

## 2023-01-23 RX ADMIN — ACETAMINOPHEN 650 MG: 325 TABLET, FILM COATED ORAL at 18:39

## 2023-01-23 RX ADMIN — SODIUM CITRATE AND CITRIC ACID MONOHYDRATE 30 ML: 500; 334 SOLUTION ORAL at 15:23

## 2023-01-23 RX ADMIN — BUPIVACAINE HYDROCHLORIDE IN DEXTROSE 1.5 ML: 7.5 INJECTION, SOLUTION SUBARACHNOID at 16:15

## 2023-01-23 RX ADMIN — MORPHINE SULFATE 0.15 MG: 1 INJECTION, SOLUTION EPIDURAL; INTRATHECAL; INTRAVENOUS at 16:15

## 2023-01-23 NOTE — OP NOTE
OPERATIVE REPORT  PATIENT NAME: Aydin Walsh    :  1985  MRN: [id number]  Pt Location: [facility] L[facility]D OR ROOM 01    SURGERY DATE: 2023    Surgeon(s) and Role:     * Alondra Slaughter MD - Primary     * Sánchez Muñoz MD - Assisting    Preop Diagnosis:  Pre-eclampsia, mild, antepartum, third trimester [O14 03]    Post-Op Diagnosis Codes:     * Pre-eclampsia, mild, antepartum, third trimester [O14 03]    Procedure(s) (LRB):   SECTION () REPEAT (N/A)  LIGATION/COAGULATION TUBAL (Bilateral)    Specimen(s):  ID Type Source Tests Collected by Time Destination   1 :  Tissue (Placenta on Hold) OB Only Placenta TISSUE EXAM OB (PLACENTA) ONLY Alondra Slaughter MD 2023 1634    2 : PRN Tissue Fallopian Tube, Left TISSUE Flaquito Wynn MD 2023 1634    3 : PRN Tissue Fallopian Tube, Right TISSUE EXAM Alondra Slaughter MD 2023 1634    A :  Cord Blood Cord BLOOD GAS, VENOUS, CORD Alondra Slaughter MD 2023 1634        Surgical QBL:  Surgical QBL (mL): 282 mL      Drains:  Urethral Catheter Double-lumen 16 Fr  (Active)   Number of days: 0       Anesthesia Type:   Spinal    Operative Indications:  Pre-eclampsia, mild, antepartum, third trimester [O14 03]  Prior LTCS x 1  Request for sterilization     [facility] [employer] Classification System:  No Multiple pregnancy, No Transverse or oblique lie, No Breech lie, Gestational age is > or =37 weeks, Multiparous, Previous uterine scar +  is [facility] GROUP 5    Operative Findings:  Findings:  1  Delivery of viable female on 23 at 1636, weight 4 lbs 12 5 oz;  Apgar scores of 9 at one minute and 9 at five minutes  2  Normal appearing placenta with centrally-inserted 3 vessel cord  3  clear amniotic fluid  4  Grossly normal uterus, tubes, and ovaries        No qualified surgical resident was available to assist in the case  The assistance of an additional surgeon was critical for completion of the case   The assistant surgeon provided assistance with exposure, hemostasis, delivery of the infant, tissue manipulation, and suturing  The assistant surgeon was present from the start of the procedure until fascial closure  I, the primary surgeon, was present and scrubbed throughout the entirety of the case and performed all key portions of the surgical procedure  Specimens:   1  Arterial and venous cord gases  2  Cord blood  3  Fallopian tubes  4  Placenta to pathology       Procedure Details   Decision was made to proceed with  section due to findings of preeclampsia without severe features @ 37w 0d  She had been scheduled for a repeat LTCS @ 39 weeks as well as a bilateral salpingectomy  Patient was made aware of these findings and the proposed plan  Risks, benefits, possible complications, alternate treatment options, and expected outcomes were discussed with the patient  The patient agreed with the proposed plan and gave informed consent  The patient was taken to the operating room where she was properly identified to the OR staff and attending physician  She received spinal anesthesia from Dr Miladis Oliver preoperatively  Fetal heart tones were appreciated and found to be appropriate  A Samuel catheter was aseptically inserted and SCDs were placed  An intravaginal betadine prep was performed  The abdomen was prepped with Chloraprep and following appropriate drying time, the patient was draped in the usual sterile manner for a Pfannenstiel incision  The patient had received Ancef 1 g IV pre-operatively for prophylaxis  A Time Out was held and the above information confirmed  The patient was identified as Aurelia Walsh and the procedure verified as  Delivery  A Pfannenstiel incision was made  After therectus muscles were  in the midline the peritoneum was identified entered avoiding bowel and bladder  The peritoneal defect was extended with blunt and sharp dissection        A low transverse uterine incision was made with the scalpel and extended laterally with blunt dissection  The amnion was ruptured  The surgeons hand was inserted through the hysterotomy and the fetal head was palpated, elevated, and delivered through the uterine incision with the assistance of fundal pressure  Baby had spontaneous cry with good color and tone  After 30 seconds of delayed cord clamping, the umbilical cord was clamped and cut  The infant was handed off to the  providers  Arterial and venous cord gases and cord blood (and a segment of umbilical cord) were obtained for evaluation and promptly sent to the lab  The placenta delivered spontaneously with uterine fundal massage and was noted to have a centrally inserted 3 vessel cord  This was also sent to the lab for placental pathology  The uterus was exteriorized and a lap sponge was used to clear the cavity of clots and products of conception  The uterine incision was closed in two layers, an interlocking and imbricating suture of 0 chromic  Good hemostasis was confirmed upon uterine closure  Each fallopian tube was identified to its fimbriated end  Avascular portions of the mesosalpinx were entered and multiple free ties of 0-chromic and Metzenbaum scissors were used to create pedicles  Once the tube was freed from mesosalpinx the proximal end was tied with 0-chromic and the tubes were excised sharply  Hemostasis was excellent  The posterior culdesac was cleared of clots and debris and the uterus was returned to the abdomen  The paracolic gutters were inspected and cleared of all clots and debris with moist lap sponges  There was oozing from the bladder peritoneum and a running suture of 3-0 monocryl was placed to achieve hemostasis  The fascia was closed with a running suture of 0 Vicryl  Subcutaneous tissues were closed reapproximated with 2-0 Plain Gut suture   The skin was closed with Insorb subcuticular absorbable staples and the incision dressed with steri-strips  Sterile dressing was applied  At the conclusion of the procedure, all needle, sponge, and instrument counts were noted to be correct x2  The patient tolerated the procedure well and was transferred to her the recovery room in stable condition       MD KAYE Yin was present for the entire procedure    Patient Disposition:  PACU         SIGNATURE: Rocío Wei MD  DATE: January 23, 2023  TIME: 5:31 PM

## 2023-01-23 NOTE — QUICK NOTE
Reviewed with patient and her  the diagnosis and the recommendation for delivery today  They agree and questions answered  Will monitor BP PP for S/S severe features and treat accordingly  Confirms desire for bilateral salpingectomy again as she has completed childbearing

## 2023-01-23 NOTE — H&P
H&P Exam - Obstetrics   Aydin Walsh 30 y o  female MRN: [id number]  Unit/Bed#:  [facility]  Encounter: [id number]    Assessment/Plan     Assessment:  37W0D gestation with pre-eclampsia without severe features  Plan:  1  Pre-eclampsia without severe features- admit, IVF, check labs (done) and protein/creatinine ratio (sent)    2   37W0D gestation - fetal monitoring reassuring,  Vertex, LEYDI= 9 62    3  Vaginal discharge in pregnancy - sterile speculum exam negative for pooling and nitrizine, LEYDI=9 7    4  Multigravida of advanced maternal age    11  GBS status -     6  Maternal care due to low transverse uterine scar from previous  delivery - desires repeat c/s and bilateral salpingectomy;  MA31 signed 2022    D/W Dr Jose Solis/[facility] via [address] [place] [address]  Noy Cameron MD  OB/GYN Hospitalist  [phone]  2023   3:03 PM    ________________________________________________________    History of Present Illness   Chief Complaint: I am having pain and I think I am leaking fluid    HPI:  Aydin Walsh is a 30 y o   female with an SAMMIE of 2023, by Last Menstrual Period at 37w0d weeks gestation who is being admitted for elevated blood pressure readings on admission  Her current obstetrical history is significant for advanced maternal age, prior , desire for tubal sterilization  Contractions: mild like menstrual cramps  Leakage of fluid: underwear feels wet  Bleeding: None  Fetal movement: present  Pregnancy complications: advanced maternal age  Review of Systems   Constitutional: Negative for activity change, appetite change, fatigue and fever  HENT: Negative for facial swelling, mouth sores and sore throat  Eyes: Negative for photophobia and visual disturbance  Respiratory: Negative for cough and shortness of breath  Cardiovascular: Negative for chest pain and leg swelling     Gastrointestinal: Positive for abdominal pain (menstrual crampiness) and constipation  Negative for diarrhea, nausea and vomiting  Endocrine: Negative for heat intolerance  Genitourinary: Positive for vaginal discharge (feels like underwear is wet)  Negative for flank pain  Musculoskeletal: Positive for back pain  Negative for joint swelling and myalgias  Skin: Negative for rash and wound  Allergic/Immunologic: Negative for immunocompromised state  Neurological: Positive for headaches  Negative for seizures, speech difficulty, light-headedness and numbness  Hematological: Does not bruise/bleed easily  Psychiatric/Behavioral: Negative for agitation, behavioral problems, confusion and hallucinations  The patient is not nervous/anxious          Historical Information   OB History    Para Term  AB Living   4 1 1 0 2 1   SAB IAB Ectopic Multiple Live Births   2 0 0   1      # Outcome Date GA Lbr Sameer/2nd Weight Sex Delivery Anes PTL Lv   4 Current            3 SAB            2 SAB            1 Term      CS-LTranv   OVIDIO     Baby complications/comments:   Past Medical History:   Diagnosis Date   • Cholelithiasis      Past Surgical History:   Procedure Laterality Date   •  SECTION         • CHOLECYSTECTOMY LAPAROSCOPIC N/A 2019    Procedure: CHOLECYSTECTOMY LAPAROSCOPIC;  Surgeon: Edu Braden MD;  Location: [address] [place] [address] OR;  Service: General     Social History   Social History     Substance and Sexual Activity   Alcohol Use Not Currently    Comment: socially     Social History     Substance and Sexual Activity   Drug Use Not Currently     Social History     Tobacco Use   Smoking Status Never   • Passive exposure: Never   Smokeless Tobacco Never     E-Cigarette/Vaping   • E-Cigarette Use Never User      E-Cigarette/Vaping Substances   • Nicotine No    • THC No    • CBD No    • Flavoring No    • Other No    • Unknown No      Family History:   Family History   Problem Relation Age of Onset   • Liver cancer Mother    • Arthritis Father        Meds/Allergies   PTA meds:   Prior to Admission Medications   Prescriptions Last Dose Informant Patient Reported? Taking? Docosahexaenoic Acid (PreNatal DHA) 200 MG CAPS 1/22/2023  Yes Yes   Sig: Take by mouth   acetaminophen (TYLENOL) 325 mg tablet 1/22/2023  Yes Yes   Sig: Take 650 mg by mouth if needed for mild pain      Facility-Administered Medications: None     No Known Allergies    Objective   Vitals: Blood pressure (!) 142/103, pulse 73, temperature (!) 97 3 °F (36 3 °C), temperature source Tympanic, resp  rate 18, height 5' 3" (1 6 m), weight 75 3 kg (166 lb), last menstrual period 05/09/2022, not currently breastfeeding  Body mass index is 29 41 kg/m²  Invasive Devices     None                 Physical Exam  Vitals reviewed  Constitutional:       General: She is not in acute distress  Appearance: Normal appearance  She is not ill-appearing, toxic-appearing or diaphoretic  HENT:      Head: Normocephalic and atraumatic  Right Ear: External ear normal       Left Ear: External ear normal       Nose: Nose normal       Mouth/Throat:      Mouth: Mucous membranes are dry  Pharynx: Oropharynx is clear  Eyes:      General: No scleral icterus  Cardiovascular:      Rate and Rhythm: Normal rate  Pulses: Normal pulses  Pulmonary:      Effort: Pulmonary effort is normal  No respiratory distress  Breath sounds: Normal breath sounds  Abdominal:      General: Bowel sounds are normal       Palpations: Abdomen is soft  There is mass (gravid term uterus)  Genitourinary:     General: Normal vulva  Musculoskeletal:      Cervical back: Neck supple  No tenderness  Right lower leg: No edema  Left lower leg: No edema  Skin:     General: Skin is warm  Capillary Refill: Capillary refill takes less than 2 seconds  Coloration: Skin is not jaundiced  Findings: No bruising or lesion  Neurological:      General: No focal deficit present        Mental Status: She is alert  Mental status is at baseline  Motor: No weakness  Gait: Gait normal    Psychiatric:         Mood and Affect: Mood normal          Behavior: Behavior normal          Thought Content: Thought content normal          Judgment: Judgment normal      FHTs- baseline 145, moderate variability, accelerations present, decelerations absent, category 1     toco - quiet    Cervix - closed/long/posterior and -3    Prenatal Labs: I have personally reviewed pertinent reports    , Blood Type:   Lab Results   Component Value Date/Time    ABO Grouping O 08/13/2022 10:37 AM    ABO Grouping O 01/11/2021 05:49 PM     , D (Rh type):   Lab Results   Component Value Date/Time    Rh Type RH(D) POSITIVE 08/13/2022 10:37 AM     , Antibody Screen: No results found for: ANTIBODYSCR , HCT/HGB:   Lab Results   Component Value Date/Time    Hematocrit 37 8 01/23/2023 02:04 PM    Hemoglobin 12 8 01/23/2023 02:04 PM      , MCV:   Lab Results   Component Value Date/Time    MCV 92 01/23/2023 02:04 PM      , Platelets:   Lab Results   Component Value Date/Time    Platelets 803 00/66/5330 02:04 PM      , 1 hour Glucola:   Lab Results   Component Value Date/Time    Glucose 158 (H) 11/26/2022 08:37 AM   , 3 hour GTT:   Lab Results   Component Value Date/Time    Glucose, GTT - 3 Hour 104 11/29/2022 12:00 AM   , Varicella: No results found for: VARICELLAIGG    , Rubella: No results found for: RUBELLAIGGQT     , VDRL/RPR:   Lab Results   Component Value Date/Time    RPR NON-REACTIVE 11/26/2022 08:37 AM      , Urine Culture/Screen:   Lab Results   Component Value Date/Time    Urine Culture No Growth <1000 cfu/mL 07/10/2022 02:57 PM       , Urine Drug Screen: No results found for: AMPHETUR, BARBTUR, BDZUR, THCUR, COCAINEUR, METHADONEUR, OPIATEUR, PCPUR, MTHAMUR, ECSTASYUR, TRICYCLICSUR, Hep B:   Lab Results   Component Value Date/Time    Hepatitis B Surface Ag neg 08/13/2022 12:00 AM     , Hep C: No components found for: HEPCSAG, EXTHEPCSAG   , HIV:   Lab Results   Component Value Date/Time    HIV AG/AB, 4th Gen NON-REACTIVE 08/13/2022 10:37 AM     , Chlamydia:   Lab Results   Component Value Date/Time    External Chlamydia Screen neg 08/13/2022 12:00 AM     , Gonorrhea:   Lab Results   Component Value Date/Time    N gonorrhoeae, DNA Probe Negative 10/27/2021 01:37 PM     , Group B Strep:  No results found for: STREPGRPB       Imaging, EKG, Pathology, and Other Studies: I have personally reviewed pertinent reports        12/19/2022 MFM US - vertex, normal fluid 10 9, EFW 4lb 2oz (39%tile)

## 2023-01-23 NOTE — PLAN OF CARE
Problem: BIRTH - VAGINAL/ SECTION  Goal: Fetal and maternal status remain reassuring during the birth process  Description: INTERVENTIONS:  - Monitor vital signs  - Monitor fetal heart rate  - Monitor uterine activity  - Monitor labor progression (vaginal delivery)  - DVT prophylaxis  - Antibiotic prophylaxis  Outcome: Progressing  Goal: Emotionally satisfying birthing experience for mother/fetus  Description: Interventions:  - Assess, plan, implement and evaluate the nursing care given to the patient in labor  - Advocate the philosophy that each childbirth experience is a unique experience and support the family's chosen level of involvement and control during the labor process   - Actively participate in both the patient's and family's teaching of the birth process  - Consider cultural, Hindu and age-specific factors and plan care for the patient in labor  Outcome: Progressing     Problem: POSTPARTUM  Goal: Experiences normal postpartum course  Description: INTERVENTIONS:  - Monitor maternal vital signs  - Assess uterine involution and lochia  Outcome: Progressing  Goal: Appropriate maternal -  bonding  Description: INTERVENTIONS:  - Identify family support  - Assess for appropriate maternal/infant bonding   -Encourage maternal/infant bonding opportunities  - Referral to  or  as needed  Outcome: Progressing  Goal: Establishment of infant feeding pattern  Description: INTERVENTIONS:  - Assess breast/bottle feeding  - Refer to lactation as needed  Outcome: Progressing  Goal: Incision(s), wounds(s) or drain site(s) healing without S/S of infection  Description: INTERVENTIONS  - Assess and document dressing, incision, wound bed, drain sites and surrounding tissue    Outcome: Progressing

## 2023-01-23 NOTE — PROCEDURES
Aurelia Erickson Nico, a [id number] at 37w0d with an SAMMIE of 2/13/2023, by Last Menstrual Period, was seen at [address] [place] [address],[address] for the following procedure(s): $Procedure Type: LEYDI, NST]    Nonstress Test  Reason for NST: Hypertension, Other (Comment) (abdominal pain)  Variability: Moderate  Decelerations: None  Accelerations: No  Acoustic Stimulator: No  Baseline: 145 BPM  Uterine Irritability: No  Contractions: Irregular    4 Quadrant LEYDI  LEYDI Q1 (cm): 2 cm  LEYDI Q2 (cm): 2 8 cm  LEYDI Q3 (cm): 1 7 cm  LEYDI Q4 (cm): 3 3 cm  LEYDI TOTAL (cm): 9 8 cm       Interpretation  Nonstress Test Interpretation: Reactive  Overall Impression: Reassuring      Brenda Vásquez MD  OB/GYN Hospitalist  [phone]  1/23/2023   2:08 PM

## 2023-01-23 NOTE — TELEPHONE ENCOUNTER
Aurelia Diop called reporting Aurelia feels her abdomen is consistently hard, feeling like menstrual cramps  Vaginal pressure, feels like needs to urinate but sometimes nothing comes out since last evening  Discomfort is constant, does not take her breath and has not gotten worse  Denies leakage of fluids, bleeding/spotting  + fetal movement  Advised will review with Dr Jesse Mcgregor and call back with recommendation  Per Dr Jesse Mcgregor, continue to monitor, report LOF, decreased FM, bleeding/spotting, increasing discomfort  Keep appt as scheduled for 1/25/2022  Return call to Jacki Diop who states Melissa Ceron is now reporting her underwear is getting wet  Advised to proceed to L/D for evaluation  Konrad/Aurelia in agreement  Dr Jesse Mcgregor and [facility] L/D notified

## 2023-01-23 NOTE — ANESTHESIA PROCEDURE NOTES
Spinal Block    Patient location during procedure: OB  Start time: 1/23/2023 4:15 PM  Reason for block: procedure for pain, at surgeon's request and primary anesthetic  Staffing  Performed: Anesthesiologist   Anesthesiologist: Brisa Johnson MD  Preanesthetic Checklist  Completed: patient identified, IV checked, site marked, risks and benefits discussed, surgical consent, monitors and equipment checked, pre-op evaluation and timeout performed  Spinal Block  Patient position: sitting  Prep: Betadine  Patient monitoring: heart rate, cardiac monitor, continuous pulse ox and frequent blood pressure checks  Approach: midline  Location: L3-4  Injection technique: single-shot  Needle  Needle type: pencil-tip   Needle gauge: 25 G  Needle length: 5 cm  Assessment  Sensory level: T4  Injection Assessment:  negative aspiration for heme, no paresthesia on injection and positive aspiration for clear CSF    Post-procedure:  site cleaned  Additional Notes  1 5 cc 0 75% bupiv  10 mcg fentanyl  150 mcg duramorph

## 2023-01-23 NOTE — ANESTHESIA POSTPROCEDURE EVALUATION
Post-Op Assessment Note    CV Status:  Stable    Pain management: adequate     Mental Status:  Alert and awake   Hydration Status:  Euvolemic   PONV Controlled:  Controlled   Airway Patency:  Patent      Post Op Vitals Reviewed: Yes      Staff: Anesthesiologist, CRNA         No notable events documented      BP  134/78   Temp 98   Pulse 61   Resp 15   SpO2 94

## 2023-01-23 NOTE — ANESTHESIA PREPROCEDURE EVALUATION
Procedure:   SECTION () REPEAT (Uterus)  LIGATION/COAGULATION TUBAL (Bilateral: Abdomen)    Relevant Problems   GYN   (+) 36 weeks gestation of pregnancy   (+) Multigravida of advanced maternal age in third trimester      NEURO/PSYCH   (+) History of recurrent miscarriages      Other   (+) COVID-19 affecting pregnancy in third trimester   (+) Elevated blood pressure reading   (+) Sterilization consult      C section 8 yrs ago  Physical Exam    Airway    Mallampati score: III  TM Distance: >3 FB  Neck ROM: full     Dental   No notable dental hx     Cardiovascular      Pulmonary      Other Findings        Anesthesia Plan  ASA Score- 2     Anesthesia Type- spinal with ASA Monitors  Additional Monitors:   Airway Plan:     Comment: NPO since  PM  Last sip of juice at 1420  bicitra ordered preop for patient  Spinal vs  GA with ETT (backup) discussed  T/S performed          Plan Factors-    Chart reviewed  Existing labs reviewed  Patient summary reviewed  Patient is not a current smoker  Patient did not smoke on day of surgery  Induction-     Postoperative Plan-     Informed Consent- Anesthetic plan and risks discussed with patient and spouse (pt is Turkmen speaking  she does understand a little bit of english   speaks Nicki Chase and translates to her well  )  I personally reviewed this patient with the CRNA  Discussed and agreed on the Anesthesia Plan with the CRNA  Moises Rodgers

## 2023-01-24 LAB
ALBUMIN SERPL BCP-MCNC: 1.6 G/DL (ref 3.5–5)
ALBUMIN SERPL BCP-MCNC: 1.7 G/DL (ref 3.5–5)
ALP SERPL-CCNC: 270 U/L (ref 46–116)
ALP SERPL-CCNC: 296 U/L (ref 46–116)
ALT SERPL W P-5'-P-CCNC: 17 U/L (ref 12–78)
ALT SERPL W P-5'-P-CCNC: 19 U/L (ref 12–78)
ANION GAP SERPL CALCULATED.3IONS-SCNC: 10 MMOL/L (ref 4–13)
ANION GAP SERPL CALCULATED.3IONS-SCNC: 8 MMOL/L (ref 4–13)
AST SERPL W P-5'-P-CCNC: 24 U/L (ref 5–45)
AST SERPL W P-5'-P-CCNC: 27 U/L (ref 5–45)
BILIRUB SERPL-MCNC: 0.2 MG/DL (ref 0.2–1)
BILIRUB SERPL-MCNC: 0.2 MG/DL (ref 0.2–1)
BUN SERPL-MCNC: 11 MG/DL (ref 5–25)
BUN SERPL-MCNC: 8 MG/DL (ref 5–25)
CALCIUM ALBUM COR SERPL-MCNC: 10.1 MG/DL (ref 8.3–10.1)
CALCIUM ALBUM COR SERPL-MCNC: 9.3 MG/DL (ref 8.3–10.1)
CALCIUM SERPL-MCNC: 7.4 MG/DL (ref 8.3–10.1)
CALCIUM SERPL-MCNC: 8.3 MG/DL (ref 8.3–10.1)
CHLORIDE SERPL-SCNC: 103 MMOL/L (ref 96–108)
CHLORIDE SERPL-SCNC: 104 MMOL/L (ref 96–108)
CO2 SERPL-SCNC: 24 MMOL/L (ref 21–32)
CO2 SERPL-SCNC: 26 MMOL/L (ref 21–32)
CREAT SERPL-MCNC: 0.72 MG/DL (ref 0.6–1.3)
CREAT SERPL-MCNC: 0.78 MG/DL (ref 0.6–1.3)
ERYTHROCYTE [DISTWIDTH] IN BLOOD BY AUTOMATED COUNT: 12 % (ref 11.6–15.1)
ERYTHROCYTE [DISTWIDTH] IN BLOOD BY AUTOMATED COUNT: 12.3 % (ref 11.6–15.1)
GFR SERPL CREATININE-BSD FRML MDRD: 108 ML/MIN/1.73SQ M
GFR SERPL CREATININE-BSD FRML MDRD: 98 ML/MIN/1.73SQ M
GLUCOSE SERPL-MCNC: 83 MG/DL (ref 65–140)
GLUCOSE SERPL-MCNC: 88 MG/DL (ref 65–140)
HCT VFR BLD AUTO: 31.6 % (ref 34.8–46.1)
HCT VFR BLD AUTO: 32.2 % (ref 34.8–46.1)
HGB BLD-MCNC: 10.6 G/DL (ref 11.5–15.4)
HGB BLD-MCNC: 11.1 G/DL (ref 11.5–15.4)
MCH RBC QN AUTO: 31.2 PG (ref 26.8–34.3)
MCH RBC QN AUTO: 31.6 PG (ref 26.8–34.3)
MCHC RBC AUTO-ENTMCNC: 33.5 G/DL (ref 31.4–37.4)
MCHC RBC AUTO-ENTMCNC: 34.5 G/DL (ref 31.4–37.4)
MCV RBC AUTO: 92 FL (ref 82–98)
MCV RBC AUTO: 93 FL (ref 82–98)
PLATELET # BLD AUTO: 210 THOUSANDS/UL (ref 149–390)
PLATELET # BLD AUTO: 210 THOUSANDS/UL (ref 149–390)
PMV BLD AUTO: 10.9 FL (ref 8.9–12.7)
PMV BLD AUTO: 11.1 FL (ref 8.9–12.7)
POTASSIUM SERPL-SCNC: 3.5 MMOL/L (ref 3.5–5.3)
POTASSIUM SERPL-SCNC: 3.9 MMOL/L (ref 3.5–5.3)
PROT SERPL-MCNC: 4.9 G/DL (ref 6.4–8.4)
PROT SERPL-MCNC: 5.1 G/DL (ref 6.4–8.4)
RBC # BLD AUTO: 3.4 MILLION/UL (ref 3.81–5.12)
RBC # BLD AUTO: 3.51 MILLION/UL (ref 3.81–5.12)
RPR SER QL: NORMAL
SODIUM SERPL-SCNC: 137 MMOL/L (ref 135–147)
SODIUM SERPL-SCNC: 138 MMOL/L (ref 135–147)
WBC # BLD AUTO: 11.73 THOUSAND/UL (ref 4.31–10.16)
WBC # BLD AUTO: 11.99 THOUSAND/UL (ref 4.31–10.16)

## 2023-01-24 RX ORDER — LABETALOL HYDROCHLORIDE 5 MG/ML
20 INJECTION, SOLUTION INTRAVENOUS ONCE
Status: COMPLETED | OUTPATIENT
Start: 2023-01-24 | End: 2023-01-24

## 2023-01-24 RX ORDER — SODIUM CHLORIDE, SODIUM LACTATE, POTASSIUM CHLORIDE, CALCIUM CHLORIDE 600; 310; 30; 20 MG/100ML; MG/100ML; MG/100ML; MG/100ML
75 INJECTION, SOLUTION INTRAVENOUS CONTINUOUS
Status: DISCONTINUED | OUTPATIENT
Start: 2023-01-24 | End: 2023-01-27 | Stop reason: HOSPADM

## 2023-01-24 RX ORDER — ENOXAPARIN SODIUM 100 MG/ML
40 INJECTION SUBCUTANEOUS
Status: DISCONTINUED | OUTPATIENT
Start: 2023-01-24 | End: 2023-01-24

## 2023-01-24 RX ORDER — MAGNESIUM SULFATE HEPTAHYDRATE 40 MG/ML
4 INJECTION, SOLUTION INTRAVENOUS ONCE
Status: COMPLETED | OUTPATIENT
Start: 2023-01-24 | End: 2023-01-24

## 2023-01-24 RX ORDER — ACETAMINOPHEN 325 MG/1
650 TABLET ORAL EVERY 6 HOURS PRN
Status: DISCONTINUED | OUTPATIENT
Start: 2023-01-24 | End: 2023-01-26

## 2023-01-24 RX ORDER — NIFEDIPINE 30 MG/1
30 TABLET, EXTENDED RELEASE ORAL DAILY
Status: DISCONTINUED | OUTPATIENT
Start: 2023-01-24 | End: 2023-01-26

## 2023-01-24 RX ORDER — MAGNESIUM SULFATE HEPTAHYDRATE 40 MG/ML
2 INJECTION, SOLUTION INTRAVENOUS CONTINUOUS
Status: DISCONTINUED | OUTPATIENT
Start: 2023-01-24 | End: 2023-01-27 | Stop reason: HOSPADM

## 2023-01-24 RX ORDER — ENOXAPARIN SODIUM 100 MG/ML
40 INJECTION SUBCUTANEOUS
Status: DISCONTINUED | OUTPATIENT
Start: 2023-01-24 | End: 2023-01-27 | Stop reason: HOSPADM

## 2023-01-24 RX ADMIN — ACETAMINOPHEN 650 MG: 325 TABLET, FILM COATED ORAL at 23:07

## 2023-01-24 RX ADMIN — MAGNESIUM SULFATE HEPTAHYDRATE 2 G/HR: 40 INJECTION, SOLUTION INTRAVENOUS at 00:52

## 2023-01-24 RX ADMIN — KETOROLAC TROMETHAMINE 30 MG: 30 INJECTION, SOLUTION INTRAMUSCULAR; INTRAVENOUS at 11:07

## 2023-01-24 RX ADMIN — MAGNESIUM SULFATE HEPTAHYDRATE 4 G: 40 INJECTION, SOLUTION INTRAVENOUS at 00:31

## 2023-01-24 RX ADMIN — DOCUSATE SODIUM 100 MG: 100 CAPSULE, LIQUID FILLED ORAL at 08:19

## 2023-01-24 RX ADMIN — SODIUM CHLORIDE, POTASSIUM CHLORIDE, SODIUM LACTATE AND CALCIUM CHLORIDE 75 ML/HR: 600; 310; 30; 20 INJECTION, SOLUTION INTRAVENOUS at 13:45

## 2023-01-24 RX ADMIN — DOCUSATE SODIUM 100 MG: 100 CAPSULE, LIQUID FILLED ORAL at 17:47

## 2023-01-24 RX ADMIN — ACETAMINOPHEN 650 MG: 325 TABLET, FILM COATED ORAL at 14:54

## 2023-01-24 RX ADMIN — ENOXAPARIN SODIUM 40 MG: 100 INJECTION SUBCUTANEOUS at 14:54

## 2023-01-24 RX ADMIN — SODIUM CHLORIDE, POTASSIUM CHLORIDE, SODIUM LACTATE AND CALCIUM CHLORIDE 75 ML/HR: 600; 310; 30; 20 INJECTION, SOLUTION INTRAVENOUS at 01:38

## 2023-01-24 RX ADMIN — ACETAMINOPHEN 650 MG: 325 TABLET, FILM COATED ORAL at 16:39

## 2023-01-24 RX ADMIN — KETOROLAC TROMETHAMINE 30 MG: 30 INJECTION, SOLUTION INTRAMUSCULAR; INTRAVENOUS at 04:34

## 2023-01-24 RX ADMIN — MAGNESIUM SULFATE HEPTAHYDRATE 2 G/HR: 40 INJECTION, SOLUTION INTRAVENOUS at 11:07

## 2023-01-24 RX ADMIN — IBUPROFEN 600 MG: 600 TABLET, FILM COATED ORAL at 18:33

## 2023-01-24 RX ADMIN — ACETAMINOPHEN 650 MG: 325 TABLET, FILM COATED ORAL at 00:36

## 2023-01-24 RX ADMIN — SIMETHICONE 80 MG: 80 TABLET, CHEWABLE ORAL at 15:01

## 2023-01-24 RX ADMIN — SODIUM CHLORIDE, POTASSIUM CHLORIDE, SODIUM LACTATE AND CALCIUM CHLORIDE 75 ML/HR: 600; 310; 30; 20 INJECTION, SOLUTION INTRAVENOUS at 00:33

## 2023-01-24 RX ADMIN — NIFEDIPINE 30 MG: 30 TABLET, FILM COATED, EXTENDED RELEASE ORAL at 11:07

## 2023-01-24 RX ADMIN — MAGNESIUM SULFATE HEPTAHYDRATE 2 G/HR: 40 INJECTION, SOLUTION INTRAVENOUS at 21:26

## 2023-01-24 RX ADMIN — ACETAMINOPHEN 650 MG: 325 TABLET, FILM COATED ORAL at 06:13

## 2023-01-24 RX ADMIN — LABETALOL HYDROCHLORIDE 20 MG: 5 INJECTION, SOLUTION INTRAVENOUS at 00:23

## 2023-01-24 NOTE — PLAN OF CARE
Problem: BIRTH - VAGINAL/ SECTION  Goal: Fetal and maternal status remain reassuring during the birth process  Description: INTERVENTIONS:  - Monitor vital signs  - Monitor fetal heart rate  - Monitor uterine activity  - Monitor labor progression (vaginal delivery)  - DVT prophylaxis  - Antibiotic prophylaxis  Outcome: Completed  Goal: Emotionally satisfying birthing experience for mother/fetus  Description: Interventions:  - Assess, plan, implement and evaluate the nursing care given to the patient in labor  - Advocate the philosophy that each childbirth experience is a unique experience and support the family's chosen level of involvement and control during the labor process   - Actively participate in both the patient's and family's teaching of the birth process  - Consider cultural, Episcopal and age-specific factors and plan care for the patient in labor  Outcome: Completed     Problem: POSTPARTUM  Goal: Experiences normal postpartum course  Description: INTERVENTIONS:  - Monitor maternal vital signs  - Assess uterine involution and lochia  Outcome: Progressing  Goal: Appropriate maternal -  bonding  Description: INTERVENTIONS:  - Identify family support  - Assess for appropriate maternal/infant bonding   -Encourage maternal/infant bonding opportunities  - Referral to  or  as needed  Outcome: Progressing  Goal: Establishment of infant feeding pattern  Description: INTERVENTIONS:  - Assess breast/bottle feeding  - Refer to lactation as needed  Outcome: Progressing  Goal: Incision(s), wounds(s) or drain site(s) healing without S/S of infection  Description: INTERVENTIONS  - Assess and document dressing, incision, wound bed, drain sites and surrounding tissue  - Provide patient and family education  - Perform skin care/dressing changes every shift  Outcome: Progressing     Problem: PAIN - ADULT  Goal: Verbalizes/displays adequate comfort level or baseline comfort level  Description: Interventions:  - Encourage patient to monitor pain and request assistance  - Assess pain using appropriate pain scale  - Administer analgesics based on type and severity of pain and evaluate response  - Implement non-pharmacological measures as appropriate and evaluate response  - Consider cultural and social influences on pain and pain management  - Notify physician/advanced practitioner if interventions unsuccessful or patient reports new pain  Outcome: Progressing     Problem: INFECTION - ADULT  Goal: Absence or prevention of progression during hospitalization  Description: INTERVENTIONS:  - Assess and monitor for signs and symptoms of infection  - Monitor lab/diagnostic results  - Monitor all insertion sites, i e  indwelling lines, tubes, and drains  - Monitor endotracheal if appropriate and nasal secretions for changes in amount and color  - [place] appropriate cooling/warming therapies per order  - Administer medications as ordered  - Instruct and encourage patient and family to use good hand hygiene technique  - Identify and instruct in appropriate isolation precautions for identified infection/condition  Outcome: Progressing  Goal: Absence of fever/infection during neutropenic period  Description: INTERVENTIONS:  - Monitor WBC    Outcome: Progressing     Problem: SAFETY ADULT  Goal: Patient will remain free of falls  Description: INTERVENTIONS:  - Educate patient/family on patient safety including physical limitations  - Instruct patient to call for assistance with activity   - Consult OT/PT to assist with strengthening/mobility   - Keep Call bell within reach  - Keep bed low and locked with side rails adjusted as appropriate  - Keep care items and personal belongings within reach  - Initiate and maintain comfort rounds  - Apply yellow socks and bracelet for high fall risk patients  - Consider moving patient to room near nurses station  Outcome: Progressing  Goal: Maintain or return to baseline ADL function  Description: INTERVENTIONS:  -  Assess patient's ability to carry out ADLs; assess patient's baseline for ADL function and identify physical deficits which impact ability to perform ADLs (bathing, care of mouth/teeth, toileting, grooming, dressing, etc )  - Assess/evaluate cause of self-care deficits   - Assess range of motion  - Assess patient's mobility; develop plan if impaired  - Assess patient's need for assistive devices and provide as appropriate  - Encourage maximum independence but intervene and supervise when necessary  - Involve family in performance of ADLs  - Assess for home care needs following discharge   - Consider OT consult to assist with ADL evaluation and planning for discharge  - Provide patient education as appropriate  Outcome: Progressing  Goal: Maintains/Returns to pre admission functional level  Description: INTERVENTIONS:  - Perform BMAT or MOVE assessment daily    - Set and communicate daily mobility goal to care team and patient/family/caregiver  - Collaborate with rehabilitation services on mobility goals if consulted  - Out of bed for toileting  - Record patient progress and toleration of activity level   Outcome: Progressing     Problem: Knowledge Deficit  Goal: Patient/family/caregiver demonstrates understanding of disease process, treatment plan, medications, and discharge instructions  Description: Complete learning assessment and assess knowledge base    Interventions:  - Provide teaching at level of understanding  - Provide teaching via preferred learning methods  Outcome: Progressing     Problem: DISCHARGE PLANNING  Goal: Discharge to home or other facility with appropriate resources  Description: INTERVENTIONS:  - Identify barriers to discharge w/patient and caregiver  - Arrange for needed discharge resources and transportation as appropriate  - Identify discharge learning needs (meds, wound care, etc )  - Arrange for interpretive services to assist at discharge as needed  - Refer to Case Management Department for coordinating discharge planning if the patient needs post-hospital services based on physician/advanced practitioner order or complex needs related to functional status, cognitive ability, or social support system  Outcome: Progressing

## 2023-01-24 NOTE — ASSESSMENT & PLAN NOTE
- s/p magnesium sulfate after full 24hrs PP     - Clinically stable without signs or symptoms of worsening pre-eclampsia  - Procardia XL 30 mg PO daily since 1/24/2023  Today -150s/90-100s - even multiple hours after her morning procardia dose  - will give additional 30mg now and increase dosing to BID starting 9:00 tomorrow

## 2023-01-24 NOTE — QUICK NOTE
OB Hospitalist On Call    Labs reviewed with Dr Nelson Phlegm - repeat cbc and cmp at 0600     Latest Reference Range & Units 01/24/23 00:35   Sodium 135 - 147 mmol/L 137   Potassium 3 5 - 5 3 mmol/L 3 5   Chloride 96 - 108 mmol/L 103   CO2 21 - 32 mmol/L 26   Anion Gap 4 - 13 mmol/L 8   BUN 5 - 25 mg/dL 11   Creatinine 0 60 - 1 30 mg/dL 0 78   Glucose, Random 65 - 140 mg/dL 88   Calcium 8 3 - 10 1 mg/dL 8 3   CORRECTED CALCIUM 8 3 - 10 1 mg/dL 10 1   AST 5 - 45 U/L 24   ALT 12 - 78 U/L 19   Alkaline Phosphatase 46 - 116 U/L 296 (H)   Total Protein 6 4 - 8 4 g/dL 5 1 (L)   Albumin 3 5 - 5 0 g/dL 1 7 (L)   TOTAL BILIRUBIN 0 20 - 1 00 mg/dL 0 20   eGFR ml/min/1 73sq m 98   WBC 4 31 - 10 16 Thousand/uL 11 73 (H)   Red Blood Cell Count 3 81 - 5 12 Million/uL 3 51 (L)   Hemoglobin 11 5 - 15 4 g/dL 11 1 (L)   HCT 34 8 - 46 1 % 32 2 (L)   MCV 82 - 98 fL 92   MCH 26 8 - 34 3 pg 31 6   MCHC 31 4 - 37 4 g/dL 34 5   RDW 11 6 - 15 1 % 12 0   Platelet Count 023 - 390 Thousands/uL 210   MPV 8 9 - 12 7 fL 11 1   (H): Data is abnormally high  (L): Data is abnormally low    Brenda BIRMINGHAM   \[facility] [place] [facility]\"" [facility] MD  OB/GYN Hospitalist  [phone]  1/24/2023   1:27 AM

## 2023-01-24 NOTE — QUICK NOTE
OB Hospitalist on Call    Called to see patient for elevated blood pressure - 171/117 and repeat in 10 mins 169/90  Patient denies headache/visual disturbance/photophobia  She is lying in bed resting  Denies right upper quadrant pain  BP (!) 160/103 (BP Location: Right arm)   Pulse 67   Temp 98 °F (36 7 °C) (Temporal)   Resp 16   Ht 5' 3" (1 6 m)   Wt 75 3 kg (166 lb)   LMP 05/09/2022 (Exact Date)   SpO2 100%   Breastfeeding Unknown   BMI 29 41 kg/m²      Urine Output - 400cc since 7pm (5 hours)    Cor - RRR  Lungs- clear to auscultation, normal respiratory effort  Abdomen - soft, nondistended,  +bowel signs,  Fundus firm, U-1, appropriately tender  Dressing - dry and intact, no blood noted  Extremities - +1 edema    DTRs - +3 patellar reflexes, +2 biceps and brachioradialis reflexes, no clonus    Assessment:  Pre-eclampsia with severe features (elevated bp >160/110)    Plan:   1  Blood pressure treated with labetalol 20mg IV x 1;   2  Magnesium sulfate 4g load, 2g/hour  3  CBC and CMP now  4  Serial blood pressures    Dr Martin Solis/Maulik OB notified via [address] [facility] MD  OB/GYN Hospitalist  [phone]  1/24/2023   12:36 AM

## 2023-01-24 NOTE — PROGRESS NOTES
Progress Note - OB/GYN  Post-Partum Physician Note   Momo Manuel Walsh 30 y o  female MRN: [id number]  Unit/Bed#: LD PACU- Encounter: [id number]  Assessment:  30y o  year-old , post-op/postpartum day #1 status-post repeat low-transverse  section with bilateral salpingectomy    Plan:  Continue routine postpartum care  Encourage ambulation  Advance diet as tolerated  VTE ppx: Lovenox (Postpartum VTE risk score of 3 for  delivery and pre-eclampsia with growth restriction)    Severe pre-eclampsia, with delivery  - Clinically stable without signs or symptoms of worsening pre-eclampsia or magnesium toxicity  - Continue magnesium sulfate x 24 hours, per protocol  - Urine output has been adequate postpartum  Continue strict I/O   - Procardia XL 30 mg PO started this morning   Will continue to monitor BP and titrate, as indicated      ______________________________________________  Patient is postop and postpartum day #1 following repeat low-transverse  section with bilateral salpingectomy     Subjective:   Pain: Well-controlled  Tolerating Oral Intake: Yes  Voiding: Yes  Ambulating: Yes  Headache: No  Changes in vision: No  Chest Pain: No  Shortness of Breath: No  RUQ pain: No  Leg Pain/Discomfort: No  Lochia: Normal    Objective:   Vitals:    23 0800 23 0900 23 1030 23 1230   BP: 148/94 140/94 144/78 (!) 138/101   BP Location: Left arm Left arm Left arm Left arm   Pulse: 75 79 84 83   Resp: 16 16  16   Temp: (!) 97 1 °F (36 2 °C)      TempSrc: Temporal      SpO2: 99% 97% 98% 100%   Weight:       Height:           Intake/Output Summary (Last 24 hours) at 2023 1251  Last data filed at 2023 0800  Gross per 24 hour   Intake 1800 ml   Output 2557 ml   Net -757 ml       Physical Exam:  General: in no apparent distress, alert and oriented times 3  Abdomen: abdomen is soft without significant tenderness, masses, organomegaly or guarding  Fundus: Firm and non-tender, 1 below the umbilicus  Incision: C/D/I  Lower extremeties: non-tender, SCDs in place and cycling  Neuro: AAO x 3    Labs/Tests:   Lab Results   Component Value Date/Time    HGB 10 6 (L) 01/24/2023 06:06 AM    HGB 11 1 (L) 01/24/2023 12:35 AM    HGB 12 8 01/23/2023 02:04 PM     01/24/2023 06:06 AM     01/24/2023 12:35 AM     01/23/2023 02:04 PM    WBC 11 99 (H) 01/24/2023 06:06 AM    WBC 11 73 (H) 01/24/2023 12:35 AM    WBC 9 06 01/23/2023 02:04 PM    CREATININE 0 72 01/24/2023 06:06 AM    CREATININE 0 78 01/24/2023 12:35 AM    CREATININE 0 77 01/23/2023 02:04 PM    ALT 17 01/24/2023 06:06 AM    ALT 19 01/24/2023 12:35 AM    ALT 23 01/23/2023 02:04 PM    AST 27 01/24/2023 06:06 AM    AST 24 01/24/2023 12:35 AM    AST 22 01/23/2023 02:04 PM    UTPCR 0 40 (H) 01/23/2023 02:04 PM        Brief OB Lab review:  ABO Grouping   Date Value Ref Range Status   01/23/2023 O  Final      Rh Factor   Date Value Ref Range Status   01/23/2023 Positive  Final     Rh Type   Date Value Ref Range Status   08/13/2022 RH(D) POSITIVE  Final     Comment:        For additional information, please refer to   http://[employer]/faq/JQQ605   (This link is being provided for informational/  educational purposes only )      No results found for: ANTIBODYSCR  No results found for: RUBM    MEDS:   Current Facility-Administered Medications   Medication Dose Route Frequency   • acetaminophen (TYLENOL) tablet 650 mg  650 mg Oral Q6H [address]   • docusate sodium (COLACE) capsule 100 mg  100 mg Oral BID   • enoxaparin (LOVENOX) subcutaneous injection 40 mg  40 mg Subcutaneous Q24H ARASH   • ibuprofen (MOTRIN) tablet 600 mg  600 mg Oral Q6H PRN   • ketorolac (TORADOL) injection 30 mg  30 mg Intravenous Q6H   • lactated ringers infusion  75 mL/hr Intravenous Continuous   • magnesium sulfate 20 g/500 mL infusion (premix)  2 g/hr Intravenous Continuous   • naloxone (NARCAN) injection 0 1 mg  0 1 mg Intravenous Q3 min PRN   • NIFEdipine (PROCARDIA XL) 24 hr tablet 30 mg  30 mg Oral Daily   • ondansetron (ZOFRAN) injection 4 mg  4 mg Intravenous Q8H PRN   • oxyCODONE (ROXICODONE) IR tablet 5 mg  5 mg Oral Q4H PRN   • simethicone (MYLICON) chewable tablet 80 mg  80 mg Oral 4x Daily PRN     Invasive Devices     Peripheral Intravenous Line  Duration           Peripheral IV 01/23/23 Dorsal (posterior); Right Hand <1 day          Drain  Duration           Urethral Catheter Double-lumen 16 Fr  <1 day                  Aarti Del Valle MD  1/24/2023 12:51 PM

## 2023-01-24 NOTE — PLAN OF CARE
Problem: BIRTH - VAGINAL/ SECTION  Goal: Fetal and maternal status remain reassuring during the birth process  Description: INTERVENTIONS:  - Monitor vital signs  - Monitor fetal heart rate  - Monitor uterine activity  - Monitor labor progression (vaginal delivery)  - DVT prophylaxis  - Antibiotic prophylaxis  Outcome: Progressing  Goal: Emotionally satisfying birthing experience for mother/fetus  Description: Interventions:  - Assess, plan, implement and evaluate the nursing care given to the patient in labor  - Advocate the philosophy that each childbirth experience is a unique experience and support the family's chosen level of involvement and control during the labor process   - Actively participate in both the patient's and family's teaching of the birth process  - Consider cultural, Muslim and age-specific factors and plan care for the patient in labor  Outcome: Progressing     Problem: POSTPARTUM  Goal: Experiences normal postpartum course  Description: INTERVENTIONS:  - Monitor maternal vital signs  - Assess uterine involution and lochia  Outcome: Progressing  Goal: Appropriate maternal -  bonding  Description: INTERVENTIONS:  - Identify family support  - Assess for appropriate maternal/infant bonding   -Encourage maternal/infant bonding opportunities  - Referral to  or  as needed  Outcome: Progressing  Goal: Establishment of infant feeding pattern  Description: INTERVENTIONS:  - Assess breast/bottle feeding  - Refer to lactation as needed  Outcome: Progressing  Goal: Incision(s), wounds(s) or drain site(s) healing without S/S of infection  Description: INTERVENTIONS  - Assess and document dressing, incision, wound bed, drain sites and surrounding tissue  - Provide patient and family education  Outcome: Progressing

## 2023-01-24 NOTE — PROGRESS NOTES
This bag was hung yesterday and was completed at this time- 1000 in from start time of IV bag rate 75ml/hr

## 2023-01-24 NOTE — CASE MANAGEMENT
Case Management Progress Note    Patient name Svetlana Walsh  Location /-50 MRN [id number]  : 1985 Date 2023       LOS (days): 1  Geometric Mean LOS (GMLOS) (days):   Days to GMLOS:        OBJECTIVE:        Current admission status: Inpatient  Preferred Pharmacy:   [facility] [address]  [facility]  [address] [address]  Phone: [phone] Fax: [phone]    Primary Care Provider: Rachael Pruitt MD (Inactive)    Primary Insurance:   Secondary Insurance:     PROGRESS NOTE:    · Delivery method/date:  2023 via   · Age: Gestational age 41w0d  · Admitted to NICU for hypoglycemia at ~9 hrs of age  SW met w/MOB who provided the following information:      · Baby's name/gender: baby girl  · Mother of baby: Eunice Pavon [phone]  · Father of baby//SO: Kellee Gaona [phone]  · Other Legal Guardian(s) for baby: no  · Alternate emergency contact: no  · Other children: yes, FOB has 2 children and MOB has 1 child  Anisha Arenas informed CM that the children have shared custody with their other parent  · Lives with: FOB, MOB, step siblings   · Support System: yes, family and friends are supportive  · Baby Supplies:  FOB reports having all baby supplies  · Bottle or Breast Feeding: Both  · Breast Pump if breast feeding: Unsure; CM to follow up with MOB  · Government Assistance Programs/WIC/EBT/SSI: no   · Work/School: children are in school, MOB is currently not working, and FOB reports being laied off  · Transportation: MOB and FOB both drive   · Pediatrician:  [facility]  · Mental Health Hx or Treatment: no  · Substance Abuse: no  · Hx DV/IPV: no  · Community Referrals/C&Y/NFP: no   · Insurance for baby: FOB reports MOB does not have insurance   PATHS referral send for MOB and baby girl  · POA/LW: no  · NICU Resources and packet: yes    · Neema[employer] Hope:  no     SW reviewed discharge planning process including the following: identifying caregivers at home, preference for d/c planning needs, availability of treatment team to discuss questions or concerns patient and/or family may have regarding diagnosis, plan of care, old or new medications and discharge planning   SW will continue to follow for care coordination and update assessment as appropriate       SW will follow infant in NICU through dc

## 2023-01-25 RX ADMIN — DOCUSATE SODIUM 100 MG: 100 CAPSULE, LIQUID FILLED ORAL at 09:11

## 2023-01-25 RX ADMIN — DOCUSATE SODIUM 100 MG: 100 CAPSULE, LIQUID FILLED ORAL at 17:49

## 2023-01-25 RX ADMIN — SIMETHICONE 80 MG: 80 TABLET, CHEWABLE ORAL at 16:25

## 2023-01-25 RX ADMIN — ENOXAPARIN SODIUM 40 MG: 100 INJECTION SUBCUTANEOUS at 13:20

## 2023-01-25 RX ADMIN — ACETAMINOPHEN 650 MG: 325 TABLET, FILM COATED ORAL at 09:16

## 2023-01-25 RX ADMIN — ACETAMINOPHEN 650 MG: 325 TABLET, FILM COATED ORAL at 16:29

## 2023-01-25 RX ADMIN — IBUPROFEN 600 MG: 600 TABLET, FILM COATED ORAL at 00:43

## 2023-01-25 RX ADMIN — IBUPROFEN 600 MG: 600 TABLET, FILM COATED ORAL at 16:26

## 2023-01-25 RX ADMIN — NIFEDIPINE 30 MG: 30 TABLET, FILM COATED, EXTENDED RELEASE ORAL at 09:11

## 2023-01-25 NOTE — PROGRESS NOTES
Discharge education reviewed with pt with iPad interpretor Tanisha Soares ([id number]) in use, Save Your Life magnet provided and discussed  iPad educational videos discussed, in progress  Questions encouraged and answered, pt reporting no further questions at this time

## 2023-01-25 NOTE — CASE MANAGEMENT
Case Management Progress Note    Patient name Char Walsh  Location /-02 MRN [id number]  : 1985 Date 2023       LOS (days): 2  Geometric Mean LOS (GMLOS) (days):   Days to GMLOS:        OBJECTIVE:        Current admission status: Inpatient  Preferred Pharmacy:   [address] [place] [address]  Phone: [phone] Fax: [phone]    Primary Care Provider: Landry Covington MD (Inactive)    Primary Insurance:   Secondary Insurance:     PROGRESS NOTE:    Consult received: Mother has baby in the NICU and needs a breast pump for home use  Met with MOB and FOB as they had questions regarding insurance and a breast pump/supplies  All questions addressed  CM requested a breast pump, premature clothing, premature bottles, and formula through [address]  E-mail sent to [facility] with [address] to request supplies

## 2023-01-25 NOTE — PROGRESS NOTES
Progress Note - OB/GYN  Post-Partum Physician Note   Char Walsh 30 y o  female MRN: [id number]  Unit/Bed#:  208-01 Encounter: [id number]    Patient is postop and postpartum day 2 from a  (with bilateral salpingectomy) with spinal anesthesia  Subjective:   Pain: yes  Tolerating Oral Intake:2 yes  Voiding: yes  Flatus: yes  Bowel Movement: no  Ambulating: yes  Breastfeeding: Using breast pump  Chest Pain: no  Shortness of Breath: no  Leg Pain/Discomfort: no  Lochia: minimal  Other:     Objective:   Vitals:    23 2230 23 0037 23 0420 23 0719   BP: 125/84 120/88 137/93 134/96   BP Location: Left arm Left arm Left arm Left arm   Pulse: 86 77 75 74   Resp: 18 18 18 17   Temp: 97 7 °F (36 5 °C)  97 8 °F (36 6 °C) 97 6 °F (36 4 °C)   TempSrc: Oral  Oral Oral   SpO2: 98% 98% 98% 97%   Weight:       Height:           Intake/Output Summary (Last 24 hours) at 2023 1159  Last data filed at 2023 2232  Gross per 24 hour   Intake 1480 ml   Output 3750 ml   Net -2270 ml       Physical Exam:  General: in no apparent distress, well developed and well nourished, non-toxic, in no respiratory distress and acyanotic, alert, oriented times 3 and afebrile  Abdomen: abdomen is soft without significant tenderness, masses, organomegaly or guarding  Fundus: Firm and non-tender, 1 below the umbilicus  Incision: C/D/I  Lower extremeties: nontender  Other:     Labs/Tests:       Brief OB Lab review:  ABO Grouping   Date Value Ref Range Status   2023 O  Final      Rh Factor   Date Value Ref Range Status   2023 Positive  Final     Rh Type   Date Value Ref Range Status   2022 RH(D) POSITIVE  Final     Comment:        For additional information, please refer to   http://[employer]/faq/FTM614   (This link is being provided for informational/  educational purposes only )      No results found for: ANTIBODYSCR  No results found for: RUBM    MEDS: Current Facility-Administered Medications   Medication Dose Route Frequency   • acetaminophen (TYLENOL) tablet 650 mg  650 mg Oral Q6H PRN   • docusate sodium (COLACE) capsule 100 mg  100 mg Oral BID   • enoxaparin (LOVENOX) subcutaneous injection 40 mg  40 mg Subcutaneous Q24H ARASH   • ibuprofen (MOTRIN) tablet 600 mg  600 mg Oral Q6H PRN   • lactated ringers infusion  75 mL/hr Intravenous Continuous   • magnesium sulfate 20 g/500 mL infusion (premix)  2 g/hr Intravenous Continuous   • NIFEdipine (PROCARDIA XL) 24 hr tablet 30 mg  30 mg Oral Daily   • ondansetron (ZOFRAN) injection 4 mg  4 mg Intravenous Q8H PRN   • oxyCODONE (ROXICODONE) IR tablet 5 mg  5 mg Oral Q4H PRN   • simethicone (MYLICON) chewable tablet 80 mg  80 mg Oral 4x Daily PRN     Invasive Devices     Peripheral Intravenous Line  Duration           Peripheral IV 23 Dorsal (posterior); Right Hand 1 day                Assessment and Plan:  30y o  year-old , postpartum day 2 status-post  , Low Transverse   Continue routine postpartum care  Encourage ambulation  Advance diet as tolerated  D/C tomorrow      Severe pre-eclampsia, with delivery  - Clinically stable without signs or symptoms of worsening pre-eclampsia or magnesium toxicity  - Continue magnesium sulfate x 24 hours, per protocol  - Urine output has been adequate postpartum  Continue strict I/O   - Procardia XL 30 mg PO started this morning  Will continue to monitor BP and titrate, as indicated  23  - Magnesium stopped at midnight   BP has been controlled with 30mg nifedipine  - Will monitor in hospital today and she will likely be ready for D/C tomorrow      Frank Vazquez MD

## 2023-01-25 NOTE — LACTATION NOTE
Met with parents to discuss feeding plan  Mother is planning on providing breast milk to baby  The Ready, Set, Baby Booklet was discussed  Discussed importance of skin to skin to help with milk production as well as stabilize temperature, blood sugars, decrease pain, promote relaxation, and calm the baby as well as for bonding that father may do as well  The Breastfeeding Discharge Booklet was discussed at this time as well  Mother given resources to look up medications to ensure they are safe with breastfeeding, by communicating with the [facility], [address] as well as using [employer] (assisted mother to pin to home screen on personal phone)    Mother aware of engorgement time frame (when mature milk comes in) and management as well as how to deal with conditions that may occur while breastfeeding (plugged ducts, milk blebs and mastitis) and when is appropriate to communicate with her OB/GYN and/or a lactation consultant  Mother stated she was comfortable with how to set up a pump, how to cycle (stimulation vs expression phases during a pumping session), flange fit, milk storage and cleaning  Mother shown handouts for tips on pumping when returning to work and paced bottle feeding  Mother shown community resources for continued support in breastfeeding once discharged home  She was encouraged to communicate with [address] [place] [address], [facility] for lactation home visits and/or with her baby's pediatrician for lactation support/services that could be offered in the practice  Addressed breast pump needs and case management consult will be placed for a double breast pump as baby is in the NICU  Parents were encouraged to call for further questions that arise prior to discharge  Education and encounter occurred in [facility], primary language of parents

## 2023-01-25 NOTE — PLAN OF CARE

## 2023-01-25 NOTE — PLAN OF CARE

## 2023-01-26 RX ORDER — NIFEDIPINE 30 MG/1
30 TABLET, EXTENDED RELEASE ORAL 2 TIMES DAILY
Status: DISCONTINUED | OUTPATIENT
Start: 2023-01-27 | End: 2023-01-27 | Stop reason: HOSPADM

## 2023-01-26 RX ORDER — ACETAMINOPHEN 325 MG/1
650 TABLET ORAL EVERY 6 HOURS SCHEDULED
Status: DISCONTINUED | OUTPATIENT
Start: 2023-01-27 | End: 2023-01-27 | Stop reason: HOSPADM

## 2023-01-26 RX ORDER — IBUPROFEN 600 MG/1
600 TABLET ORAL EVERY 6 HOURS SCHEDULED
Status: DISCONTINUED | OUTPATIENT
Start: 2023-01-27 | End: 2023-01-27 | Stop reason: HOSPADM

## 2023-01-26 RX ORDER — NIFEDIPINE 30 MG/1
30 TABLET, EXTENDED RELEASE ORAL ONCE
Status: COMPLETED | OUTPATIENT
Start: 2023-01-26 | End: 2023-01-26

## 2023-01-26 RX ADMIN — ACETAMINOPHEN 650 MG: 325 TABLET, FILM COATED ORAL at 08:11

## 2023-01-26 RX ADMIN — NIFEDIPINE 30 MG: 30 TABLET, FILM COATED, EXTENDED RELEASE ORAL at 14:18

## 2023-01-26 RX ADMIN — OXYCODONE HYDROCHLORIDE 5 MG: 5 TABLET ORAL at 20:50

## 2023-01-26 RX ADMIN — IBUPROFEN 600 MG: 600 TABLET, FILM COATED ORAL at 20:49

## 2023-01-26 RX ADMIN — NIFEDIPINE 30 MG: 30 TABLET, FILM COATED, EXTENDED RELEASE ORAL at 08:12

## 2023-01-26 RX ADMIN — IBUPROFEN 600 MG: 600 TABLET, FILM COATED ORAL at 00:59

## 2023-01-26 RX ADMIN — DOCUSATE SODIUM 100 MG: 100 CAPSULE, LIQUID FILLED ORAL at 18:38

## 2023-01-26 RX ADMIN — ACETAMINOPHEN 650 MG: 325 TABLET, FILM COATED ORAL at 00:59

## 2023-01-26 RX ADMIN — SIMETHICONE 80 MG: 80 TABLET, CHEWABLE ORAL at 13:35

## 2023-01-26 RX ADMIN — ACETAMINOPHEN 650 MG: 325 TABLET, FILM COATED ORAL at 20:49

## 2023-01-26 RX ADMIN — SIMETHICONE 80 MG: 80 TABLET, CHEWABLE ORAL at 01:00

## 2023-01-26 RX ADMIN — DOCUSATE SODIUM 100 MG: 100 CAPSULE, LIQUID FILLED ORAL at 08:12

## 2023-01-26 RX ADMIN — ENOXAPARIN SODIUM 40 MG: 100 INJECTION SUBCUTANEOUS at 13:35

## 2023-01-26 RX ADMIN — ACETAMINOPHEN 650 MG: 325 TABLET, FILM COATED ORAL at 14:22

## 2023-01-26 RX ADMIN — IBUPROFEN 600 MG: 600 TABLET, FILM COATED ORAL at 14:22

## 2023-01-26 RX ADMIN — IBUPROFEN 600 MG: 600 TABLET, FILM COATED ORAL at 08:12

## 2023-01-26 RX ADMIN — OXYCODONE HYDROCHLORIDE 5 MG: 5 TABLET ORAL at 13:35

## 2023-01-26 NOTE — PROGRESS NOTES
Progress Note - OB/GYN  Post-Partum Physician Note   Yennifer Walsh 30 y o  female MRN: [id number]  Unit/Bed#: -01 Encounter: [id number]    Patient is postop and postpartum day 3 from a  (repeat) with spinal anesthesia  Subjective:   Pain: controlled  Headache: improved with medication  Tolerating Oral Intake: yes  Voiding: yes  Flatus: yes  Bowel Movement: no  Ambulating: yes  Breastfeeding: Using breast pump  Shortness of Breath: no  Leg Pain/Discomfort: no  Lochia: normal      Objective:   Vitals:    23 0803 23 0915 23 0930 23 1329   BP: (!) 158/109 (!) 146/106 133/99 149/100   BP Location:    Right arm   Pulse:    74   Resp:    16   Temp:    97 9 °F (36 6 °C)   TempSrc:    Temporal   SpO2:    99%   Weight:       Height:         No intake or output data in the 24 hours ending 23 1424    Physical Exam:  General: in no apparent distress  Abdomen: abdomen is soft without significant tenderness  Fundus: Firm, 1 below the umbilicus  Incision: C/D/I  Lower extremities: nontender      Labs/Tests:   Lab Results   Component Value Date    WBC 11 99 (H) 2023    HGB 10 6 (L) 2023    HCT 31 6 (L) 2023    MCV 93 2023     2023       Brief OB Lab review:  ABO Grouping   Date Value Ref Range Status   2023 O  Final      Rh Factor   Date Value Ref Range Status   2023 Positive  Final     Rh Type   Date Value Ref Range Status   2022 RH(D) POSITIVE  Final     Comment:        For additional information, please refer to   [email or web address]   (This link is being provided for informational/  educational purposes only )      No results found for: ANTIBODYSCR  External Rubella IGG Quantitation   Date Value Ref Range Status   2022 13 2  Final         MEDS:   Current Facility-Administered Medications   Medication Dose Route Frequency   • acetaminophen (TYLENOL) tablet 650 mg  650 mg Oral Q6H PRN   • docusate sodium (COLACE) capsule 100 mg  100 mg Oral BID   • enoxaparin (LOVENOX) subcutaneous injection 40 mg  40 mg Subcutaneous Q24H ARASH   • ibuprofen (MOTRIN) tablet 600 mg  600 mg Oral Q6H PRN   • lactated ringers infusion  75 mL/hr Intravenous Continuous   • magnesium sulfate 20 g/500 mL infusion (premix)  2 g/hr Intravenous Continuous   • [START ON 2023] NIFEdipine (PROCARDIA XL) 24 hr tablet 30 mg  30 mg Oral BID   • ondansetron (ZOFRAN) injection 4 mg  4 mg Intravenous Q8H PRN   • oxyCODONE (ROXICODONE) IR tablet 5 mg  5 mg Oral Q4H PRN   • simethicone (MYLICON) chewable tablet 80 mg  80 mg Oral 4x Daily PRN     Invasive Devices     Peripheral Intravenous Line  Duration           Peripheral IV 23 Dorsal (posterior); Right Hand 2 days                Assessment and Plan:  30y o  year-old [id number], postpartum day 3 status-post  , Low Transverse   Continue routine postpartum care  Encourage ambulation  VTE ppx:  ambulation and Lovenox     Planning discharge tomorrow - if BP stabilize  Severe pre-eclampsia, with delivery  - s/p magnesium sulfate after full 24hrs PP     - Clinically stable without signs or symptoms of worsening pre-eclampsia  - Procardia XL 30 mg PO daily since 2023  Today -150s/90-100s - even multiple hours after her morning procardia dose  - will give additional 30mg now and increase dosing to BID starting 9:00 tomorrow         Delmis Bishop MD

## 2023-01-26 NOTE — PLAN OF CARE

## 2023-01-26 NOTE — PLAN OF CARE
Problem: POSTPARTUM  Goal: Experiences normal postpartum course  Description: INTERVENTIONS:  - Monitor maternal vital signs  - Assess uterine involution and lochia  Outcome: Progressing  Goal: Appropriate maternal -  bonding  Description: INTERVENTIONS:  - Identify family support  - Assess for appropriate maternal/infant bonding   -Encourage maternal/infant bonding opportunities  - Referral to  or  as needed  Outcome: Progressing  Goal: Establishment of infant feeding pattern  Description: INTERVENTIONS:  - Assess breast/bottle feeding  - Refer to lactation as needed  Outcome: Progressing  Goal: Incision(s), wounds(s) or drain site(s) healing without S/S of infection  Description: INTERVENTIONS  - Assess and document dressing, incision, wound bed, drain sites and surrounding tissue  - Provide patient and family education  - Perform skin care/dressing changes every   Outcome: Progressing     Problem: PAIN - ADULT  Goal: Verbalizes/displays adequate comfort level or baseline comfort level  Description: Interventions:  - Encourage patient to monitor pain and request assistance  - Assess pain using appropriate pain scale  - Administer analgesics based on type and severity of pain and evaluate response  - Implement non-pharmacological measures as appropriate and evaluate response  - Consider cultural and social influences on pain and pain management  - Notify physician/advanced practitioner if interventions unsuccessful or patient reports new pain  Outcome: Progressing     Problem: INFECTION - ADULT  Goal: Absence or prevention of progression during hospitalization  Description: INTERVENTIONS:  - Assess and monitor for signs and symptoms of infection  - Monitor lab/diagnostic results  - Monitor all insertion sites, i e  indwelling lines, tubes, and drains  - Monitor endotracheal if appropriate and nasal secretions for changes in amount and color  - [place] appropriate cooling/warming therapies per order  - Administer medications as ordered  - Instruct and encourage patient and family to use good hand hygiene technique  - Identify and instruct in appropriate isolation precautions for identified infection/condition  Outcome: Progressing  Goal: Absence of fever/infection during neutropenic period  Description: INTERVENTIONS:  - Monitor WBC    Outcome: Progressing     Problem: SAFETY ADULT  Goal: Patient will remain free of falls  Description: INTERVENTIONS:  - Educate patient/family on patient safety including physical limitations  - Instruct patient to call for assistance with activity   - Consult OT/PT to assist with strengthening/mobility   - Keep Call bell within reach  - Keep bed low and locked with side rails adjusted as appropriate  - Keep care items and personal belongings within reach  - Initiate and maintain comfort rounds  - Make Fall Risk Sign visible to staff  - Offer Toileting every  Hours, in advance of need  - Initiate/Maintain alarm  - Obtain necessary fall risk management equipment:   - Apply yellow socks and bracelet for high fall risk patients  - Consider moving patient to room near nurses station  Outcome: Progressing  Goal: Maintain or return to baseline ADL function  Description: INTERVENTIONS:  -  Assess patient's ability to carry out ADLs; assess patient's baseline for ADL function and identify physical deficits which impact ability to perform ADLs (bathing, care of mouth/teeth, toileting, grooming, dressing, etc )  - Assess/evaluate cause of self-care deficits   - Assess range of motion  - Assess patient's mobility; develop plan if impaired  - Assess patient's need for assistive devices and provide as appropriate  - Encourage maximum independence but intervene and supervise when necessary  - Involve family in performance of ADLs  - Assess for home care needs following discharge   - Consider OT consult to assist with ADL evaluation and planning for discharge  - Provide patient education as appropriate  Outcome: Progressing  Goal: Maintains/Returns to pre admission functional level  Description: INTERVENTIONS:  - Perform BMAT or MOVE assessment daily    - Set and communicate daily mobility goal to care team and patient/family/caregiver  - Collaborate with rehabilitation services on mobility goals if consulted  - Perform Range of Motion  times a day  - Reposition patient every  hours  - Dangle patient  times a day  - Stand patient  times a day  - Ambulate patient  times a day  - Out of bed to chair  times a day   - Out of bed for meals  times a day  - Out of bed for toileting  - Record patient progress and toleration of activity level   Outcome: Progressing     Problem: Knowledge Deficit  Goal: Patient/family/caregiver demonstrates understanding of disease process, treatment plan, medications, and discharge instructions  Description: Complete learning assessment and assess knowledge base    Interventions:  - Provide teaching at level of understanding  - Provide teaching via preferred learning methods  Outcome: Progressing     Problem: DISCHARGE PLANNING  Goal: Discharge to home or other facility with appropriate resources  Description: INTERVENTIONS:  - Identify barriers to discharge w/patient and caregiver  - Arrange for needed discharge resources and transportation as appropriate  - Identify discharge learning needs (meds, wound care, etc )  - Arrange for interpretive services to assist at discharge as needed  - Refer to Case Management Department for coordinating discharge planning if the patient needs post-hospital services based on physician/advanced practitioner order or complex needs related to functional status, cognitive ability, or social support system  Outcome: Progressing

## 2023-01-27 ENCOUNTER — TELEPHONE (OUTPATIENT)
Dept: OBGYN CLINIC | Facility: CLINIC | Age: 38
End: 2023-01-27

## 2023-01-27 VITALS
HEIGHT: 63 IN | HEART RATE: 97 BPM | BODY MASS INDEX: 29.41 KG/M2 | SYSTOLIC BLOOD PRESSURE: 141 MMHG | WEIGHT: 166 LBS | TEMPERATURE: 97.9 F | RESPIRATION RATE: 18 BRPM | OXYGEN SATURATION: 99 % | DIASTOLIC BLOOD PRESSURE: 97 MMHG

## 2023-01-27 RX ORDER — NIFEDIPINE 30 MG/1
30 TABLET, EXTENDED RELEASE ORAL 2 TIMES DAILY
Qty: 60 TABLET | Refills: 1 | Status: SHIPPED | OUTPATIENT
Start: 2023-01-27 | End: 2023-02-26

## 2023-01-27 RX ORDER — IBUPROFEN 600 MG/1
600 TABLET ORAL EVERY 6 HOURS SCHEDULED
Qty: 30 TABLET | Refills: 0 | Status: SHIPPED | OUTPATIENT
Start: 2023-01-27

## 2023-01-27 RX ADMIN — IBUPROFEN 600 MG: 600 TABLET, FILM COATED ORAL at 03:06

## 2023-01-27 RX ADMIN — OXYCODONE HYDROCHLORIDE 5 MG: 5 TABLET ORAL at 00:45

## 2023-01-27 RX ADMIN — OXYCODONE HYDROCHLORIDE 5 MG: 5 TABLET ORAL at 06:33

## 2023-01-27 RX ADMIN — DOCUSATE SODIUM 100 MG: 100 CAPSULE, LIQUID FILLED ORAL at 08:30

## 2023-01-27 RX ADMIN — NIFEDIPINE 30 MG: 30 TABLET, FILM COATED, EXTENDED RELEASE ORAL at 08:30

## 2023-01-27 RX ADMIN — ACETAMINOPHEN 650 MG: 325 TABLET, FILM COATED ORAL at 03:05

## 2023-01-27 RX ADMIN — ACETAMINOPHEN 650 MG: 325 TABLET, FILM COATED ORAL at 08:30

## 2023-01-27 RX ADMIN — IBUPROFEN 600 MG: 600 TABLET, FILM COATED ORAL at 08:30

## 2023-01-27 NOTE — DISCHARGE SUMMARY
Discharge Summary - OB/GYN   Mani Walhs 30 y o  female MRN: [id number]  Unit/Bed#: -01 Encounter: [id number]      Admission Date: 2023     Discharge Date: 23    Admitting Diagnosis:   1  Pregnancy at 37w0d  2  Pre-eclampsia    Discharge Diagnosis:   Same, delivered      Procedures: repeat  section, low transverse incision    Delivery Attending: Anna Mireles MD  Discharge Attending: Kelsey Harrington DO    Hospital Course:     Mani Walsh is a 30 y o  [id number] at 37w0d wks who was initially admitted for scheduled repeat LTCS  She delivered a viable female  on 23 at 1636  Weight 4lbs 12 5oz via repeat  section, low transverse incision  Apgars were 9 (1 min) and 9 (5 min)   was transferred to  nursery  Patient tolerated the procedure well and was transferred to recovery in stable condition  Her post-operative course was complicated by increase in blood pressure  Patient was treated with Magnesium Sulfate for 24 hours and blood pressures were managed with Procardia XL 30 mg P O  BID  Preoperative hemoglobin was 12 8, postoperative was 10 6  Her postoperative pain was well controlled with oral analgesics  On day of discharge, she was ambulating and able to reasonably perform all ADLs  She was voiding and had appropriate bowel function  Pain was well controlled  She was discharged home on post-operative day #4 without complications  Patient was instructed to follow up with her OB as an outpatient and was given appropriate warnings to call provider if she develops signs of infection or uncontrolled pain  Complications: none apparent    Condition at discharge: good     Discharge instructions/Information to patient and family:   See after visit summary for information provided to patient and family        Provisions for Follow-Up Care:  See after visit summary for information related to follow-up care and any pertinent home health orders  Disposition: Home    Planned Readmission: No    Discharge Medications:   For a complete list of the patient's medications, please refer to her med rec       9:32 AM  1/27/2023

## 2023-01-27 NOTE — TELEPHONE ENCOUNTER
No current insurance on file  See lactation note: 1/25/23: noted that  [address] [facility] will be purchasing and mailing them a double breast pump to their home address

## 2023-01-27 NOTE — PLAN OF CARE
Problem: POSTPARTUM  Goal: Experiences normal postpartum course  Description: INTERVENTIONS:  - Monitor maternal vital signs  - Assess uterine involution and lochia  Outcome: Completed  Goal: Appropriate maternal -  bonding  Description: INTERVENTIONS:  - Identify family support  - Assess for appropriate maternal/infant bonding   -Encourage maternal/infant bonding opportunities  - Referral to  or  as needed  Outcome: Completed  Goal: Establishment of infant feeding pattern  Description: INTERVENTIONS:  - Assess breast/bottle feeding  - Refer to lactation as needed  Outcome: Completed  Goal: Incision(s), wounds(s) or drain site(s) healing without S/S of infection  Description: INTERVENTIONS  - Assess and document dressing, incision, wound bed, drain sites and surrounding tissue  - Provide patient and family education    Outcome: Completed     Problem: PAIN - ADULT  Goal: Verbalizes/displays adequate comfort level or baseline comfort level  Description: Interventions:  - Encourage patient to monitor pain and request assistance  - Assess pain using appropriate pain scale  - Administer analgesics based on type and severity of pain and evaluate response  - Implement non-pharmacological measures as appropriate and evaluate response  - Consider cultural and social influences on pain and pain management  - Notify physician/advanced practitioner if interventions unsuccessful or patient reports new pain  Outcome: Completed     Problem: INFECTION - ADULT  Goal: Absence or prevention of progression during hospitalization  Description: INTERVENTIONS:  - Assess and monitor for signs and symptoms of infection  - Monitor lab/diagnostic results  - Monitor all insertion sites, i e  indwelling lines, tubes, and drains  - Monitor endotracheal if appropriate and nasal secretions for changes in amount and color  - [place] appropriate cooling/warming therapies per order  - Administer medications as ordered  - Instruct and encourage patient and family to use good hand hygiene technique  - Identify and instruct in appropriate isolation precautions for identified infection/condition  Outcome: Completed  Goal: Absence of fever/infection during neutropenic period  Description: INTERVENTIONS:  - Monitor WBC    Outcome: Completed     Problem: SAFETY ADULT  Goal: Patient will remain free of falls  Description: INTERVENTIONS:  - Educate patient/family on patient safety including physical limitations  - Instruct patient to call for assistance with activity   - Consult OT/PT to assist with strengthening/mobility   - Keep Call bell within reach  - Keep bed low and locked with side rails adjusted as appropriate  - Keep care items and personal belongings within reach  - Initiate and maintain comfort rounds  - Make Fall Risk Sign visible to staff    - Apply yellow socks and bracelet for high fall risk patients  - Consider moving patient to room near nurses station  Outcome: Completed  Goal: Maintain or return to baseline ADL function  Description: INTERVENTIONS:  -  Assess patient's ability to carry out ADLs; assess patient's baseline for ADL function and identify physical deficits which impact ability to perform ADLs (bathing, care of mouth/teeth, toileting, grooming, dressing, etc )  - Assess/evaluate cause of self-care deficits   - Assess range of motion  - Assess patient's mobility; develop plan if impaired  - Assess patient's need for assistive devices and provide as appropriate  - Encourage maximum independence but intervene and supervise when necessary  - Involve family in performance of ADLs  - Assess for home care needs following discharge   - Consider OT consult to assist with ADL evaluation and planning for discharge  - Provide patient education as appropriate  Outcome: Completed  Goal: Maintains/Returns to pre admission functional level  Description: INTERVENTIONS:  - Perform BMAT or MOVE assessment daily     - Set and communicate daily mobility goal to care team and patient/family/caregiver  - Collaborate with rehabilitation services on mobility goals if consulted    - Out of bed for toileting  - Record patient progress and toleration of activity level   Outcome: Completed     Problem: Knowledge Deficit  Goal: Patient/family/caregiver demonstrates understanding of disease process, treatment plan, medications, and discharge instructions  Description: Complete learning assessment and assess knowledge base    Interventions:  - Provide teaching at level of understanding  - Provide teaching via preferred learning methods  Outcome: Completed     Problem: DISCHARGE PLANNING  Goal: Discharge to home or other facility with appropriate resources  Description: INTERVENTIONS:  - Identify barriers to discharge w/patient and caregiver  - Arrange for needed discharge resources and transportation as appropriate  - Identify discharge learning needs (meds, wound care, etc )  - Arrange for interpretive services to assist at discharge as needed  - Refer to Case Management Department for coordinating discharge planning if the patient needs post-hospital services based on physician/advanced practitioner order or complex needs related to functional status, cognitive ability, or social support system  Outcome: Completed

## 2023-01-27 NOTE — PROGRESS NOTES
Post- Progress note    Patient is post-op day 4 from a  delivery  Pain: controlled  Tolerating Oral Intake: yes  Voiding: yes  Flatus:yes  Ambulating: yes  Breastfeeding: well  Chest Pain: no  Shortness of Breath: no  Leg Pain/Discomfort: no  Lochia: minimal      Objective:   Vitals:    23 0815   BP: 141/97   Pulse: 97   Resp: 18   Temp: 97 9 °F (36 6 °C)   SpO2: 99%     No intake or output data in the 24 hours ending 23 6877    Physical Exam:  General: alert awake oriented  Cardiovascular: RRR  Lungs: clear  Abdomen:soft, nontender, minimal distention  Fundus: below umbilicus  Incision: clean dry intact  Lower extremeties: no evidence of DBT    Assessment and Plan:  30y o  year-old [id number], postoperative day 4 status-post  delivery    Continue routine post op care  VTE ppx: Lovenox  Encourage ambulation    Severe pre-eclampsia, with delivery  - s/p magnesium sulfate after full 24hrs PP     - Clinically stable without signs or symptoms of worsening pre-eclampsia  - Procardia XL 30 mg PO daily since 2023    Increased to Procardia XL 30 mg P O  BID  Today -140/  -  Plan to discharge to home today with follow up in Ob/Gyn office in 3-4 days for Blood pressure and incision check     Jesica Back DO

## 2023-02-02 ENCOUNTER — POSTPARTUM VISIT (OUTPATIENT)
Dept: OBGYN CLINIC | Facility: CLINIC | Age: 38
End: 2023-02-02

## 2023-02-02 VITALS
SYSTOLIC BLOOD PRESSURE: 110 MMHG | HEIGHT: 60 IN | BODY MASS INDEX: 30.31 KG/M2 | WEIGHT: 154.4 LBS | DIASTOLIC BLOOD PRESSURE: 70 MMHG

## 2023-02-05 PROBLEM — O23.13: Status: RESOLVED | Noted: 2022-12-07 | Resolved: 2023-02-05

## 2023-02-12 NOTE — PROGRESS NOTES
Assessment/Plan:         Diagnoses and all orders for this visit:    Postpartum care following  delivery  Healing well PP and BP is in a desirable range        Subjective:      Patient ID: Trino Walsh is a 30 y o  female  HPI    The following portions of the patient's history were reviewed and updated as appropriate: allergies, current medications, past family history, past medical history, past social history, past surgical history and problem list     Review of Systems      Objective:      /70 (BP Location: Left arm, Patient Position: Sitting, Cuff Size: Standard)   Ht 5' (1 524 m)   Wt 70 kg (154 lb 6 4 oz)   LMP 2022 (Exact Date)   Breastfeeding Yes   BMI 30 15 kg/m²          Physical Exam    Incision healing well   No erythema or tenderness

## 2023-03-09 ENCOUNTER — POSTPARTUM VISIT (OUTPATIENT)
Dept: OBGYN CLINIC | Facility: CLINIC | Age: 38
End: 2023-03-09

## 2023-03-09 VITALS
SYSTOLIC BLOOD PRESSURE: 136 MMHG | HEIGHT: 63 IN | WEIGHT: 156 LBS | DIASTOLIC BLOOD PRESSURE: 84 MMHG | BODY MASS INDEX: 27.64 KG/M2

## 2023-03-09 PROBLEM — U07.1 COVID-19 AFFECTING PREGNANCY IN THIRD TRIMESTER: Status: RESOLVED | Noted: 2022-12-23 | Resolved: 2023-03-09

## 2023-03-09 PROBLEM — O26.893 ABDOMINAL PAIN IN PREGNANCY, THIRD TRIMESTER: Status: RESOLVED | Noted: 2023-01-23 | Resolved: 2023-03-09

## 2023-03-09 PROBLEM — Z30.09 STERILIZATION CONSULT: Status: RESOLVED | Noted: 2022-11-22 | Resolved: 2023-03-09

## 2023-03-09 PROBLEM — O34.211 MATERNAL CARE DUE TO LOW TRANSVERSE UTERINE SCAR FROM PREVIOUS CESAREAN DELIVERY: Status: RESOLVED | Noted: 2022-09-28 | Resolved: 2023-03-09

## 2023-03-09 PROBLEM — O09.523 MULTIGRAVIDA OF ADVANCED MATERNAL AGE IN THIRD TRIMESTER: Status: RESOLVED | Noted: 2022-07-07 | Resolved: 2023-03-09

## 2023-03-09 PROBLEM — R10.9 ABDOMINAL PAIN IN PREGNANCY, THIRD TRIMESTER: Status: RESOLVED | Noted: 2023-01-23 | Resolved: 2023-03-09

## 2023-03-09 PROBLEM — O98.513 COVID-19 AFFECTING PREGNANCY IN THIRD TRIMESTER: Status: RESOLVED | Noted: 2022-12-23 | Resolved: 2023-03-09

## 2023-03-09 PROBLEM — Z3A.36 36 WEEKS GESTATION OF PREGNANCY: Status: RESOLVED | Noted: 2022-08-04 | Resolved: 2023-03-09

## 2023-03-09 PROBLEM — Z98.891 HISTORY OF CESAREAN DELIVERY: Status: RESOLVED | Noted: 2022-07-07 | Resolved: 2023-03-09

## 2023-03-09 NOTE — PROGRESS NOTES
Gunnar Hackett [facility], [address]    Assessment/Plan:  Drusilla Soulier is a 30y o  year old [id number] who presents for postpartum visit  Routine Postpartum Care  1  Normal postpartum exam  2  Contraception: tubal ligation  3  Depression Screen: 6 - Primarily due to sleep deprivation  Will make an effort to nap when the baby naps and will call if her symptoms do not improve  4  Feeding: formula  5  Psychosocial support: good  6  Patient Education: "[employer]enn [employer]: [employer]e [employer]ity[employer]  com"  7  Cervical cancer screening Up to Date, next due   8  Follow up in: 3 months or as needed  Additional Problems:  1  Routine postpartum follow-up          Subjective:     CC: Postpartum visit    Aurelia Gonzalez is a 30 y o  y o  female [id number] who presents for a postpartum visit  She is 6 weeks postpartum following a low cervical transverse  section on 23 at 37 0 weeks due to preeclampsia - without severe features upon presentation but evolved to severe features PP  Outcome: repeat  section, low transverse incision  Anesthesia: spinal  Postpartum course has been unremarkable  Baby's course has been unremarkable  Baby is feeding by bottle -    Bleeding Stopped and had what she thinks is her menses this week  Bowel function is normal  Bladder function is normal  Patient is not sexually active  Contraception method is tubal ligation  Postpartum depression screening: negative      The following portions of the patient's history were reviewed and updated as appropriate: allergies, current medications, past family history, past medical history, obstetric history, gynecologic history, past social history, past surgical history and problem list       Objective:  /84 (BP Location: Left arm, Patient Position: Sitting, Cuff Size: Standard)   Ht 5' 3" (1 6 m)   Wt 70 8 kg (156 lb)   LMP 2023 (Exact Date)   Breastfeeding Yes   BMI 27 63 kg/m² Pregravid Weight/BMI: 67 6 kg (149 lb) (BMI 26 40)  Current Weight: 70 8 kg (156 lb)   Total Weight Gain: 7 711 kg (17 lb)   Pre-Oswald Vitals    Flowsheet Row Most Recent Value   Prenatal Assessment    Prenatal Vitals    Blood Pressure 136/84   Weight - Scale 70 8 kg (156 lb)   Urine Albumin/Glucose    Dilation/Effacement/Station    Vaginal Drainage    Edema            General: Well appearing, no distress  Mood and affect: Appropriate  Abdomen: Soft, nontender  Thyroid: No masses  Incision: well healed  Vulva: Well healed  Vagina: Well healed  No lesions  Urethra: Normal  Cervix: Healed, no lesions  Uterus: Normal size, no masses  Adnexa: No pain or masses  Extremities: Warm and well perfused  Non tender

## 2023-05-04 ENCOUNTER — TELEPHONE (OUTPATIENT)
Dept: OBGYN CLINIC | Facility: CLINIC | Age: 38
End: 2023-05-04

## 2023-05-04 ENCOUNTER — OFFICE VISIT (OUTPATIENT)
Dept: FAMILY MEDICINE CLINIC | Facility: CLINIC | Age: 38
End: 2023-05-04

## 2023-05-04 VITALS
HEIGHT: 62 IN | BODY MASS INDEX: 28.49 KG/M2 | WEIGHT: 154.8 LBS | HEART RATE: 101 BPM | OXYGEN SATURATION: 99 % | TEMPERATURE: 96.6 F | DIASTOLIC BLOOD PRESSURE: 78 MMHG | SYSTOLIC BLOOD PRESSURE: 122 MMHG

## 2023-05-04 DIAGNOSIS — G43.919 INTRACTABLE MIGRAINE WITHOUT STATUS MIGRAINOSUS, UNSPECIFIED MIGRAINE TYPE: Primary | ICD-10-CM

## 2023-05-04 PROBLEM — R10.32 LEFT LOWER QUADRANT ABDOMINAL PAIN: Status: RESOLVED | Noted: 2021-11-10 | Resolved: 2023-05-04

## 2023-05-04 PROBLEM — Z59.89 DOES NOT HAVE HEALTH INSURANCE: Status: RESOLVED | Noted: 2021-05-07 | Resolved: 2023-05-04

## 2023-05-04 PROBLEM — K80.00 ACUTE CALCULOUS CHOLECYSTITIS: Status: RESOLVED | Noted: 2019-07-25 | Resolved: 2023-05-04

## 2023-05-04 RX ORDER — KETOROLAC TROMETHAMINE 30 MG/ML
30 INJECTION, SOLUTION INTRAMUSCULAR; INTRAVENOUS ONCE
Status: COMPLETED | OUTPATIENT
Start: 2023-05-04 | End: 2023-05-04

## 2023-05-04 RX ORDER — ONDANSETRON 4 MG/1
4 TABLET, FILM COATED ORAL EVERY 8 HOURS PRN
Qty: 20 TABLET | Refills: 0 | Status: SHIPPED | OUTPATIENT
Start: 2023-05-04

## 2023-05-04 RX ADMIN — KETOROLAC TROMETHAMINE 30 MG: 30 INJECTION, SOLUTION INTRAMUSCULAR; INTRAVENOUS at 12:15

## 2023-05-04 NOTE — TELEPHONE ENCOUNTER
Aurelia called in and spoke with  indicating she is vomiting and has a fever  No other symptoms  Per chart notes, currently formula feeding infant  No h/o IUD  Recommended patient f/u with PCP

## 2023-05-04 NOTE — PROGRESS NOTES
Name: Savanna Walsh      : 1985      MRN: [id number]  Encounter Provider: Rodriguez Soler DO  Encounter Date: 2023   Encounter department: [address] [place] [address]  Intractable migraine without status migrainosus, unspecified migraine type  -     ketorolac (TORADOL) injection 30 mg  -     ondansetron (ZOFRAN) 4 mg tablet; Take 1 tablet (4 mg total) by mouth every 8 (eight) hours as needed for nausea or vomiting (associated with migraine)  No red flags  No systemic sx  No history of neoplasm  No neurological deficit on today's exam  She denies abrupt onset of her headache, worsening with position changes or exertion  Suspect that this is migraine headache estephania given family history of migraine in sister and personal history of headaches  Will give her 30 mg IM toradol in office today as well as rx for zofran 4 mg to take Q8 hours as needed for nausea and vomiting associated with her migraine  She was advised that if her headache worsens in severity or she is unable to keep PO down, should proceed to ED  She understands and agrees  Will f/u in 1 week to discuss migraine treatment moving forward  Subjective     HPI    #[id number] [facility] [employer]     Patient is a 41year old Malagasy-speaking female who is being seen today as a new patient to this office for acute visit for the complaint of nausea, vomiting and headache  She has never seen a PCP before  [facility]  Reports that she had a headache and vomiting last week that lasted for 2 days  Sx resolved entirely and then recurred again today  She has been unable to keep po down today  Headache is located bilaterally behind eyes and in forehead  Described as sharp  No aggravating or alleviating factors  Does have history of headaches but they have never been as severe as these two  Sister has migraines     Associated symptoms including "photophobia, nausea and vomiting  She has no fever but feels chilled  No rhinorrhea, nasal congestion, sore throat or other URI sx  She has dizziness at times  Some neck pain but this is unchanged from her baseline  States \"muscles are always swollen\"  She has no abdominal pain and no diarrhea  No ill contacts  No recent travel  Is not breastfeeding  No prior history of cancer  Denies trauma      Review of Systems    Past Medical History:   Diagnosis Date    Cholelithiasis     COVID-19 affecting pregnancy in third trimester 2022    History of  delivery 2022    Previous C/s in [facility] for CPD vs LGA, pt uncertain  No records available  Desires to scheduled repeat C/S    Sterilization consult 2022    Patient wishes permanent sterilization with repeat LTCS   2022 - pt is self pay but working on insurance, will have sign MA-31 form in case needed at time of sterilization  2022- MA 31 reviewed and signed with patient while using   Both [place] and [place] version signed        Past Surgical History:   Procedure Laterality Date     SECTION          CHOLECYSTECTOMY LAPAROSCOPIC N/A 2019    Procedure: CHOLECYSTECTOMY LAPAROSCOPIC;  Surgeon: Swathi Caldera MD;  Location: [address] [place] [address] OR;  Service: General    [place]  DELIVERY ONLY N/A 2023    Procedure: Beatrice Ng () REPEAT;  Surgeon: Chavo Guerrero MD;  Location: [facility];  Service: Obstetrics    TUBAL LIGATION Bilateral 2023    Procedure: LIGATION/COAGULATION TUBAL;  Surgeon: Chavo Guerrero MD;  Location: [facility];  Service: Obstetrics     Family History   Problem Relation Age of Onset    Liver cancer Mother     Arthritis Father      Social History     Socioeconomic History    Marital status: Single     Spouse name: None    Number of children: None    Years of education: None    Highest education level: None   Occupational History    None   Tobacco Use    " "Smoking status: Never     Passive exposure: Never    Smokeless tobacco: Never   Vaping Use    Vaping Use: Never used   Substance and Sexual Activity    Alcohol use: Not Currently     Comment: socially    Drug use: Not Currently    Sexual activity: Not Currently     Partners: Male     Comment: no new partner in past year   Other Topics Concern    None   Social History Narrative    ** Merged History Encounter **          Social Determinants of Health     Financial Resource Strain: Not on file   Food Insecurity: Not on file   Transportation Needs: Not on file   Physical Activity: Not on file   Stress: Not on file   Social Connections: Not on file   Intimate Partner Violence: Not on file   Housing Stability: Not on file     Current Outpatient Medications on File Prior to Visit   Medication Sig    acetaminophen (TYLENOL) 325 mg tablet Take 650 mg by mouth if needed for mild pain (Patient not taking: Reported on 5/4/2023)    Docosahexaenoic Acid (PreNatal DHA) 200 MG CAPS Take by mouth (Patient not taking: Reported on 5/4/2023)     No Known Allergies  Immunization History   Administered Date(s) Administered    INFLUENZA 11/05/2020    Influenza, injectable, quadrivalent, preservative free 0 5 mL 11/22/2022    Tdap 11/22/2022       Objective     /78 (BP Location: Left arm, Patient Position: Sitting, Cuff Size: Standard)   Pulse 101   Temp (!) 96 6 °F (35 9 °C) (Tympanic)   Ht 5' 1 5\" (1 562 m)   Wt 70 2 kg (154 lb 12 8 oz)   SpO2 99%   Breastfeeding No   BMI 28 78 kg/m²     Physical Exam  Vitals and nursing note reviewed  Constitutional:       General: She is not in acute distress  Appearance: Normal appearance  She is not ill-appearing, toxic-appearing or diaphoretic  HENT:      Head: Normocephalic and atraumatic        Right Ear: Tympanic membrane normal       Left Ear: Tympanic membrane normal       Nose: Nose normal       Mouth/Throat:      Mouth: Mucous membranes are moist       Pharynx: " No oropharyngeal exudate or posterior oropharyngeal erythema  Eyes:      Extraocular Movements: Extraocular movements intact  Conjunctiva/sclera: Conjunctivae normal       Pupils: Pupils are equal, round, and reactive to light  Comments: Fundi not well visualized   Neck:      Vascular: No carotid bruit  Comments: Able to flex chin to chest with no difficulty/pain  Cardiovascular:      Rate and Rhythm: Normal rate and regular rhythm  Heart sounds: No murmur heard  Pulmonary:      Effort: Pulmonary effort is normal       Breath sounds: Normal breath sounds  Abdominal:      General: Abdomen is flat  Bowel sounds are normal  There is no distension  Palpations: Abdomen is soft  There is no mass  Tenderness: There is no abdominal tenderness  Musculoskeletal:         General: Normal range of motion  Cervical back: Normal range of motion and neck supple  Right lower leg: No edema  Left lower leg: No edema  Lymphadenopathy:      Cervical: No cervical adenopathy  Skin:     General: Skin is warm and dry  Findings: No rash  Neurological:      General: No focal deficit present  Mental Status: She is alert and oriented to person, place, and time  Cranial Nerves: No cranial nerve deficit  Sensory: No sensory deficit  Motor: No weakness        Coordination: Coordination normal       Gait: Gait normal       Deep Tendon Reflexes: Reflexes normal    Psychiatric:         Mood and Affect: Mood normal        Genoveva Parker DO

## 2023-05-04 NOTE — PATIENT INSTRUCTIONS
Migraña   LO QUE NECESITA SABER:   Jamse Caldron es un dolor de pastor intenso  El dolor puede ser tan severo que interfiere con haven actividades cotidianas  Jamse Caldron puede durar desde pocas horas hasta varios días  La causa exacta de la migraña no es conocida  INSTRUCCIONES SOBRE EL [facility]ITAL[facility]:   Llame al número local de emergencias (911 en [address]) o pídale a alguien que llame si:  Usted siente que se va a desmayar, se siente confundido o sufre lexus convulsión  Regrese a la reinaldo de emergencias si:  Usted tiene dolor de pastor que parece ser diferente o mucho peor que pagan migraña habitual     Usted tiene un dolor de pastor severo con fiebre o rigidez en el tanika  Usted tiene [employer] con el habla, la visión, el equilibrio o el [address]  Llame a pagan médico o neurólogo si:  Pagan migraña interfiere con haven actividades cotidianas  Haven medicamentos o tratamientos scarlet de funcionar  Usted tiene preguntas o inquietudes acerca de pagan condición o cuidado  Medicamentos: Algunos medicamentos pueden administrarse solamente mientras está en el servicio de urgencias  También es posible que más adelante necesite medicamentos para controlar las migrañas u otros problemas de nayana que puedan causar  [place] el medicamento tan pronto guy sienta que le comienza Jamse Caldron, o según le hayan indicado  Medicamentos para la migraña se utilizan para ayudar a evitar o detener lexus Jhonatan Kojo  GLORIA pueden disminuir la inflamación y el dolor o la fiebre  Randa medicamento está disponible con o sin lexus receta médica  Los GLORIA pueden causar sangrado estomacal o problemas renales en ciertas personas  Si usted parminder un medicamento anticoagulante, siempre pregúntele a pagan médico si los GLORIA son seguros para usted  Siempre liam la etiqueta de randa medicamento y [place] instrucciones  Acetaminofén [employer] dolor y baja la fiebre  Está disponible sin receta médica   Pregunte la cantidad y la frecuencia con que debe tomarlos  [address]  [place] las etiquetas de todos los demás medicamentos que esté usando para saber si también contienen acetaminofén, o pregunte a pagan médico o farmacéutico  El acetaminofén puede causar daño en el hígado cuando no se parminder de forma correcta  Puede administrarse podrían administrarse  Pregunte al médico cómo debe eli randa medicamento de forma shah  Algunos medicamentos recetados para el dolor contienen acetaminofén  No tome otros medicamentos que contengan acetaminofén sin consultarlo con pagan médico  Demasiado acetaminofeno puede causar daño al hígado  Los medicamentos recetados para el dolor podrían causar estreñimiento  Pregunte a pagan médico guy prevenir o tratar estreñimiento  Los [employer] contra las náuseas pueden darse para calmar pagan estómago y ayudarle a prevenir los vómitos  Randa medicamento también puede aliviar el dolor  Los esteroides pueden administrarse para evitar que la migraña vuelva a aparecer de inmediato  [place] irena medicamentos guy se le haya indicado  Consulte con pagan médico si usted haim que pagan medicamento no le está ayudando o si presenta efectos secundarios  Infórmele al médico si usted es alérgico a algún medicamento  Mantenga lexus lista actualizada de los [employer], las vitaminas y los productos herbales que parminder  Incluya los siguientes datos de los medicamentos: cantidad, frecuencia y motivo de administración  Traiga con usted la lista o los envases de las píldoras a irena citas de seguimiento  Lleve la lista de los medicamentos con usted en deann de lexus emergencia  El [place] de irena síntomas:  Repose en lexus habitación oscura y [place] ayudará a disminuir el dolor  El dormir también podría ayudarlo a aliviar pagan dolor  Aplique hielo para reducir el dolor  Use lexus compresa de hielo o ponga hielo triturado en lexus bolsa de plástico  [place] el paquete de hielo con Selestino Bustle toalla y colóqueselo en la pastor   Aplique hielo odell 15 a 20 minutos cada hora  Aplique calor para disminuir el dolor y los espasmos musculares  Utilice lexus toalla pequeña empapada con [place], lexus almohada térmica o tome un baño de north con agua tibia  Aplique la compresa caliente sobre el área por 20 a 30 minutos cada 2 horas  Usted puede alternar el calor y el hielo  Mantenga un registro de las migrañas  Escriba cuándo comienzan y terminan irena migrañas  [place] y [place] haciendo cuando comenzó [place]  Registre lo que comió y lo que tomó las 24 horas antes de que comenzara pagan migraña  Mantenga un registro de lo que hizo para tratar pagan migraña y si funcionó  Laroy Foster registro de las migrañas con usted a las citas con pagan médico     Los factores desencadenantes comunes de la migraña incluyen los siguientes:  El estrés, fatiga de los ojos, dormir demasiado o no dormir lo suficiente    Cambios hormonales en las mujeres debido a las píldoras anticonceptivas, embarazo, menopausia o odell la menstruación    Omitir comidas, pasar mucho tiempo sin comer o no eli suficientes líquidos    Ciertos alimentos o bebidas, guy el chocolate, queso patel, alcohol o bebidas que contienen cafeína    Alimentos que contienen gluten, nitratos, glutamato monosódico o endulzantes artificiales    Lissette solar, luces brillantes o luces parpadeantes, ruidos rohan, humo u olores rohan    [employer], humedad o cambios en el clima    Evite otra migraña:  Evite el dolor de pastor por uso excesivo de medicamentos  [place] los analgésicos solamente según le hayan indicado  Un medicamento puede estar limitado a lexus cierta cantidad cada mes  El médico puede ayudarlo a crear un plan para que reciba lexus cantidad shah cada mes  No fume  La nicotina y otras sustancias químicas en los cigarrillos y puros pueden desencadenar lexus Cozetta Stalls  Pida información a pagan médico si usted actualmente fuma y necesita ayuda para dejar de fumar   [address] sin humo igualmente contienen nicotina  Consulte con pagan médico antes de [employer]  No consuma alcohol  El alcohol puede provocar migraña  También puede impedir que [employer] medicamentos para la migraña  Sea físicamente [place]  La actividad física, guy el ejercicio, puede ayudar a [employer]  Consulte con pagan médico acerca del mejor plan de actividad para usted  Trate de hacer al menos 30 minutos de actividad física la mayoría de [place]  Controle el estrés  El estrés podría provocar migraña  Aprenda nuevas maneras de relajarse guy la respiración profunda  Establezca un horario para dormir  Acuéstese y levántese a la misma hora cada día  No harrison televisión inmediatamente antes de acostarse  Consuma alimentos saludables y variados  Incluya alimentos saludables guy fruta, verduras, panes integrales, productos lácteos bajos en grasa, frijoles, carne magra y pescado  No consuma alimentos o bebidas que puedan desencadenar irena migrañas  [place] más líquidos para evitar la deshidratación  Pagan médico le indicará cuánto líquido debería beber a diario y qué líquidos son los mejores para usted  Acuda a irena consultas de control con pagan médico o neurólogo según le indicaron: Lleve pagan registro de migrañas con usted  Anote irena preguntas para que se acuerde de hacerlas odell irena visitas  © Copyright Leah Cutting 2022 Information is for End User's use only and may not be sold, redistributed or otherwise used for commercial purposes  Esta información es sólo para uso en educación  Pagan intención no es darle un consejo médico sobre enfermedades o tratamientos  Colsulte con pagan Ric[place]d [place] farmacéutico antes de seguir cualquier régimen médico para saber si es seguro y efectivo para usted

## 2023-05-10 NOTE — PROGRESS NOTES
Name: Raymond Walsh      : 1985      MRN: [id number]  Encounter Provider: Joan Stanley DO  Encounter Date: 2023   Encounter department: [address] [place] [address]  Migraine without aura and without status migrainosus, not intractable  -     SUMAtriptan (Imitrex) 50 mg tablet; Take 1 tablet (50 mg total) by mouth once as needed for migraine for up to 1 dose    Asked patient to keep headache diary  Suspect that her migraines may be menstrual related since last once occurred day prior to onset of her menstrual cycle  Interrupted sleep could also be trigger  Since she is only getting 3 headaches per month on average, will hold off on starting prophylactic medication for now  rx for imtrex 50 mg po to take for her migraines  Will return in 1 month for recheck on the migraines and for her annual physical    Advised that she should call or return to office should headaches worsen in severity or develop any neurological changes such as dizziness, fever, numbness, tingling, weakness  She is agreeable to this plan  2  Screening for cardiovascular condition  -     Lipid panel; Future  -     Lipid panel    3  Screening for diabetes mellitus  -     Comprehensive metabolic panel; Future  -     Comprehensive metabolic panel    4  Screening for thyroid disorder  -     TSH, 3rd generation with Free T4 reflex; Future  -     TSH, 3rd generation with Free T4 reflex    5  Screening for deficiency anemia  -     CBC and differential; Future  -     CBC and differential           Subjective     HPI   Hebrew speaking patient  Utilized [employer]  # [id number] (yessy) for today's visit  Patient is a 41year old female who was seen as a new patient last week for complaint of headache  She had associated nausea and vomiting     Neurological exam was unremarkable and no red flags were noted on her exam    She endorsed prior history of headaches and family history of migraine in sister but had never been diagnosed with migraine headaches herself  She was diagnosed with migraine headache and was given toradol 30 mg IM and rx for zofran 4 mg po for her nausea and vomiting  Is here today to discuss ongoing migraine management  She states that since her last visit, the nausea and vomiting have resolved  The toradol relieved her headache entirely until 2 days ago when headache recurred  Headache is not as severe as it was last week and she has been taking tylenol with relief  FDLMP 23  Will typically get about 3 headaches over the course of one month  She is not sure if there are any triggers but notes that her sleep is interrupted due caring for infant daughter  Review of Systems    Past Medical History:   Diagnosis Date   • Cholelithiasis    • COVID-19 affecting pregnancy in third trimester 2022   • History of  delivery 2022    Previous C/s in [facility] for CPD vs LGA, pt uncertain  No records available  Desires to scheduled repeat C/S   • Sterilization consult 2022    Patient wishes permanent sterilization with repeat LTCS   2022 - pt is self pay but working on insurance, will have sign MA-31 form in case needed at time of sterilization  2022- MA 31 reviewed and signed with patient while using   Both [place] and Telugu version signed        Past Surgical History:   Procedure Laterality Date   •  SECTION         • CHOLECYSTECTOMY LAPAROSCOPIC N/A 2019    Procedure: CHOLECYSTECTOMY LAPAROSCOPIC;  Surgeon: Anni Mirza MD;  Location: [address] [place] [address] OR;  Service: General   • [place]  DELIVERY ONLY N/A 2023    Procedure: Jolly Tran () REPEAT;  Surgeon: Dillan Sorensen MD;  [facility];  Service: Obstetrics   • TUBAL LIGATION Bilateral 2023    Procedure: LIGATION/COAGULATION TUBAL;  Surgeon: Dillan Sorensen MD;  Location: [facility];  Service: Obstetrics     Family "History   Problem Relation Age of Onset   • Liver cancer Mother    • Arthritis Father      Social History     Socioeconomic History   • Marital status: Single     Spouse name: None   • Number of children: None   • Years of education: None   • Highest education level: None   Occupational History   • None   Tobacco Use   • Smoking status: Never     Passive exposure: Never   • Smokeless tobacco: Never   Vaping Use   • Vaping Use: Never used   Substance and Sexual Activity   • Alcohol use: Not Currently     Comment: socially   • Drug use: Not Currently   • Sexual activity: Not Currently     Partners: Male     Comment: no new partner in past year   Other Topics Concern   • None   Social History Narrative    ** Merged History Encounter **          Social Determinants of Health     Financial Resource Strain: Not on file   Food Insecurity: Not on file   Transportation Needs: Not on file   Physical Activity: Not on file   Stress: Not on file   Social Connections: Not on file   Intimate Partner Violence: Not on file   Housing Stability: Not on file     Current Outpatient Medications on File Prior to Visit   Medication Sig   • acetaminophen (TYLENOL) 325 mg tablet Take 650 mg by mouth if needed for mild pain   • [DISCONTINUED] Docosahexaenoic Acid (PreNatal DHA) 200 MG CAPS Take by mouth (Patient not taking: Reported on 5/4/2023)   • [DISCONTINUED] ondansetron (ZOFRAN) 4 mg tablet Take 1 tablet (4 mg total) by mouth every 8 (eight) hours as needed for nausea or vomiting (associated with migraine) (Patient not taking: Reported on 5/11/2023)     No Known Allergies  Immunization History   Administered Date(s) Administered   • INFLUENZA 11/05/2020   • Influenza, injectable, quadrivalent, preservative free 0 5 mL 11/22/2022   • Tdap 11/22/2022       Objective     /78 (BP Location: Left arm, Patient Position: Sitting, Cuff Size: Standard)   Pulse 72   Temp 97 7 °F (36 5 °C) (Tympanic)   Ht 5' 1\" (1 549 m)   Wt 71 8 kg (158 " lb 3 2 oz)   LMP 05/05/2023 (Approximate)   SpO2 96%   BMI 29 89 kg/m²     Physical Exam  Vitals and nursing note reviewed  Constitutional:       General: She is not in acute distress  Appearance: Normal appearance  She is obese  She is not ill-appearing, toxic-appearing or diaphoretic  HENT:      Head: Normocephalic and atraumatic  Right Ear: Tympanic membrane normal       Left Ear: Tympanic membrane normal       Nose: Nose normal       Mouth/Throat:      Mouth: Mucous membranes are moist       Pharynx: No oropharyngeal exudate or posterior oropharyngeal erythema  Eyes:      Extraocular Movements: Extraocular movements intact  Conjunctiva/sclera: Conjunctivae normal       Pupils: Pupils are equal, round, and reactive to light  Cardiovascular:      Rate and Rhythm: Normal rate and regular rhythm  Pulmonary:      Effort: Pulmonary effort is normal       Breath sounds: Normal breath sounds  Musculoskeletal:      Cervical back: Normal range of motion and neck supple  Lymphadenopathy:      Cervical: No cervical adenopathy  Neurological:      General: No focal deficit present  Mental Status: She is alert and oriented to person, place, and time  Cranial Nerves: No cranial nerve deficit  Sensory: No sensory deficit  Motor: No weakness        Gait: Gait normal       Deep Tendon Reflexes: Reflexes normal    Psychiatric:         Mood and Affect: Mood normal        Beto Cordial, DO

## 2023-05-11 ENCOUNTER — OFFICE VISIT (OUTPATIENT)
Dept: FAMILY MEDICINE CLINIC | Facility: CLINIC | Age: 38
End: 2023-05-11

## 2023-05-11 VITALS
OXYGEN SATURATION: 96 % | BODY MASS INDEX: 29.87 KG/M2 | SYSTOLIC BLOOD PRESSURE: 124 MMHG | HEART RATE: 72 BPM | DIASTOLIC BLOOD PRESSURE: 78 MMHG | TEMPERATURE: 97.7 F | HEIGHT: 61 IN | WEIGHT: 158.2 LBS

## 2023-05-11 DIAGNOSIS — Z13.1 SCREENING FOR DIABETES MELLITUS: ICD-10-CM

## 2023-05-11 DIAGNOSIS — Z13.0 SCREENING FOR DEFICIENCY ANEMIA: ICD-10-CM

## 2023-05-11 DIAGNOSIS — G43.009 MIGRAINE WITHOUT AURA AND WITHOUT STATUS MIGRAINOSUS, NOT INTRACTABLE: Primary | ICD-10-CM

## 2023-05-11 DIAGNOSIS — Z13.29 SCREENING FOR THYROID DISORDER: ICD-10-CM

## 2023-05-11 DIAGNOSIS — Z13.6 SCREENING FOR CARDIOVASCULAR CONDITION: ICD-10-CM

## 2023-05-11 PROBLEM — G43.001 MIGRAINE WITHOUT AURA AND WITH STATUS MIGRAINOSUS, NOT INTRACTABLE: Status: RESOLVED | Noted: 2023-05-11 | Resolved: 2023-05-11

## 2023-05-11 PROBLEM — G43.001 MIGRAINE WITHOUT AURA AND WITH STATUS MIGRAINOSUS, NOT INTRACTABLE: Status: ACTIVE | Noted: 2023-05-11

## 2023-05-11 RX ORDER — SUMATRIPTAN 50 MG/1
50 TABLET, FILM COATED ORAL ONCE AS NEEDED
Qty: 9 TABLET | Refills: 0 | Status: SHIPPED | OUTPATIENT
Start: 2023-05-11

## 2023-05-11 NOTE — PATIENT INSTRUCTIONS
Migraña   LO QUE NECESITA SABER:   Isabela Mcarthur es un dolor de pastor intenso  El dolor puede ser tan severo que interfiere con haven actividades cotidianas  Isabela Mcarthur puede durar desde pocas horas hasta varios días  La causa exacta de la migraña no es conocida  INSTRUCCIONES SOBRE EL [facility]:   Llame al número local de emergencias (911 en [address] ) o pídale a alguien que llame si:  Usted siente que se va a desmayar, se siente confundido o sufre lexus convulsión  Regrese a la reinaldo de emergencias si:  Usted tiene dolor de pastor que parece ser diferente o mucho peor que pagan migraña habitual     Usted tiene un dolor de pastor severo con fiebre o rigidez en el tanika  Usted tiene [employer] con el habla, la visión, el equilibrio o el [address]  Llame a pagan médico o neurólogo si:  Pagan migraña interfiere con haven actividades cotidianas  Haven medicamentos o tratamientos scarlet de funcionar  Usted tiene preguntas o inquietudes acerca de pagan condición o cuidado  Medicamentos: Algunos medicamentos pueden administrarse solamente mientras está en el servicio de urgencias  También es posible que más adelante necesite medicamentos para controlar las migrañas u otros problemas de nayana que puedan causar  Intercourse el medicamento tan pronto guy sienta que le comienza Isabela Mcarthur, o según le hayan indicado  Medicamentos para la migraña se utilizan para ayudar a evitar o detener lexus Jeaneen Aram  GLORIA pueden disminuir la inflamación y el dolor o la fiebre  Randa medicamento está disponible con o sin lexus receta médica  Los GLORIA pueden causar sangrado estomacal o problemas renales en ciertas personas  Si usted parminder un medicamento anticoagulante, siempre pregúntele a pagan médico si los GLORIA son seguros para usted  Siempre liam la etiqueta de randa medicamento y [place] instrucciones  Acetaminofén [employer] dolor y baja la fiebre  Está disponible sin receta médica   Pregunte la cantidad y la frecuencia con que debe tomarlos  [address]  [place] las etiquetas de todos los demás medicamentos que esté usando para saber si también contienen acetaminofén, o pregunte a pagan médico o farmacéutico  El acetaminofén puede causar daño en el hígado cuando no se parminder de forma correcta  Puede administrarse podrían administrarse  Pregunte al médico cómo debe eli randa medicamento de forma shah  Algunos medicamentos recetados para el dolor contienen acetaminofén  No tome otros medicamentos que contengan acetaminofén sin consultarlo con pagan médico  Demasiado acetaminofeno puede causar daño al hígado  Los medicamentos recetados para el dolor podrían causar estreñimiento  Pregunte a pagan médico guy prevenir o tratar estreñimiento  Los [employer] contra las náuseas pueden darse para calmar pagan estómago y ayudarle a prevenir los vómitos  Randa medicamento también puede aliviar el dolor  Los esteroides pueden administrarse para evitar que la migraña vuelva a aparecer de inmediato  [place] irena medicamentos guy se le haya indicado  Consulte con pagan médico si usted haim que pagan medicamento no le está ayudando o si presenta efectos secundarios  Infórmele al médico si usted es alérgico a algún medicamento  Mantenga lexus lista actualizada de los [employer], las vitaminas y los productos herbales que parminder  Incluya los siguientes datos de los medicamentos: cantidad, frecuencia y motivo de administración  Traiga con usted la lista o los envases de las píldoras a irena citas de seguimiento  Lleve la lista de los medicamentos con usted en deann de lexus emergencia  [place] de irena síntomas:  Repose en lexus habitación oscura y [place]  [place] ayudará a disminuir el dolor  El dormir también podría ayudarlo a aliviar pagan dolor  Aplique hielo para reducir el dolor  Use lexus compresa de hielo o ponga hielo triturado en lexus bolsa de plástico  [place] el paquete de hielo con [place] toalla y colóqueselo en la pastor   Aplique hielo odell 15 a 20 minutos cada hora  Aplique calor para disminuir el dolor y los espasmos musculares  Utilice lexus toalla pequeña empapada con [place], lexus almohada térmica o tome un baño de north con agua tibia  Aplique la compresa caliente sobre el área por 20 a 30 minutos cada 2 horas  Usted puede alternar el calor y el hielo  Mantenga un registro de las migrañas  Escriba cuándo comienzan y terminan irena migrañas  Orlene Bi y [place] haciendo cuando comenzó [place]  Registre lo que comió y lo que tomó las 24 horas antes de que comenzara pagan migraña  Mantenga un registro de lo que hizo para tratar pagan migraña y si funcionó  [place] registro de las migrañas con usted a las citas con pagan médico     Los factores desencadenantes comunes de la migraña incluyen los siguientes:  El estrés, fatiga de los ojos, dormir demasiado o no dormir lo suficiente    Cambios hormonales en las mujeres debido a las píldoras anticonceptivas, embarazo, menopausia o odell la menstruación    Omitir comidas, pasar mucho tiempo sin comer o no eli suficientes líquidos    Ciertos alimentos o bebidas, guy el chocolate, queso patel, alcohol o bebidas que contienen cafeína    Alimentos que contienen gluten, nitratos, glutamato monosódico o endulzantes artificiales    Lissette solar, luces brillantes o luces parpadeantes, ruidos rohan, humo u olores rohan    [employer], humedad o cambios en el clima    Evite otra migraña:  Evite el dolor de pastor por uso excesivo de medicamentos  [place] los analgésicos solamente según le hayan indicado  Un medicamento puede estar limitado a lexus cierta cantidad cada mes  El médico puede ayudarlo a crear un plan para que reciba lexus cantidad shah cada mes  No fume  La nicotina y otras sustancias químicas en los cigarrillos y puros pueden desencadenar lexus [employer]  Pida información a pagan médico si usted actualmente fuma y necesita ayuda para dejar de fumar   [address] sin humo igualmente contienen nicotina  Consulte con pagan médico antes de [employer]  No consuma alcohol  El alcohol puede provocar migraña  También puede impedir que [employer] medicamentos para la migraña  Sea físicamente [place]  La actividad física, guy el ejercicio, puede ayudar a [employer]  Consulte con pagan médico acerca del mejor plan de actividad para usted  Trate de hacer al menos 30 minutos de actividad física la mayoría de [place]  Controle el estrés  El estrés podría provocar migraña  Aprenda nuevas maneras de relajarse guy la respiración profunda  Establezca un horario para dormir  Acuéstese y levántese a la misma hora cada día  No harrison televisión inmediatamente antes de acostarse  Consuma alimentos saludables y variados  Incluya alimentos saludables guy fruta, verduras, panes integrales, productos lácteos bajos en grasa, frijoles, carne magra y pescado  No consuma alimentos o bebidas que puedan desencadenar irena migrañas  [place] más líquidos para evitar la deshidratación  Pagan médico le indicará cuánto líquido debería beber a diario y qué líquidos son los mejores para usted  Acuda a irena consultas de control con pagan médico o neurólogo según le indicaron: Lleve pagan registro de migrañas con usted  Anote irena preguntas para que se acuerde de hacerlas odell irena visitas  © Copyright Vesta Cower 2022 Information is for End User's use only and may not be sold, redistributed or otherwise used for commercial purposes  Esta información es sólo para uso en educación  Pagan intención no es darle un consejo médico sobre enfermedades o tratamientos  Colsulte con pagan [place] farmacéutico antes de seguir cualquier régimen médico para saber si es seguro y efectivo para usted

## 2023-05-23 LAB
ALBUMIN SERPL-MCNC: 4.3 G/DL (ref 3.6–5.1)
ALBUMIN/GLOB SERPL: 1.6 (CALC) (ref 1–2.5)
ALP SERPL-CCNC: 62 U/L (ref 31–125)
ALT SERPL-CCNC: 29 U/L (ref 6–29)
AST SERPL-CCNC: 21 U/L (ref 10–30)
BASOPHILS # BLD AUTO: 57 CELLS/UL (ref 0–200)
BASOPHILS NFR BLD AUTO: 0.8 %
BILIRUB SERPL-MCNC: 0.6 MG/DL (ref 0.2–1.2)
BUN SERPL-MCNC: 14 MG/DL (ref 7–25)
BUN/CREAT SERPL: NORMAL (CALC) (ref 6–22)
CALCIUM SERPL-MCNC: 9.1 MG/DL (ref 8.6–10.2)
CHLORIDE SERPL-SCNC: 103 MMOL/L (ref 98–110)
CHOLEST SERPL-MCNC: 185 MG/DL
CHOLEST/HDLC SERPL: 3.3 (CALC)
CO2 SERPL-SCNC: 28 MMOL/L (ref 20–32)
CREAT SERPL-MCNC: 0.74 MG/DL (ref 0.5–0.97)
EOSINOPHIL # BLD AUTO: 249 CELLS/UL (ref 15–500)
EOSINOPHIL NFR BLD AUTO: 3.5 %
ERYTHROCYTE [DISTWIDTH] IN BLOOD BY AUTOMATED COUNT: 12.3 % (ref 11–15)
GFR/BSA.PRED SERPLBLD CYS-BASED-ARV: 107 ML/MIN/1.73M2
GLOBULIN SER CALC-MCNC: 2.7 G/DL (CALC) (ref 1.9–3.7)
GLUCOSE SERPL-MCNC: 85 MG/DL (ref 65–99)
HCT VFR BLD AUTO: 37.5 % (ref 35–45)
HDLC SERPL-MCNC: 56 MG/DL
HGB BLD-MCNC: 13.3 G/DL (ref 11.7–15.5)
LDLC SERPL CALC-MCNC: 115 MG/DL (CALC)
LYMPHOCYTES # BLD AUTO: 2428 CELLS/UL (ref 850–3900)
LYMPHOCYTES NFR BLD AUTO: 34.2 %
MCH RBC QN AUTO: 31.2 PG (ref 27–33)
MCHC RBC AUTO-ENTMCNC: 35.5 G/DL (ref 32–36)
MCV RBC AUTO: 88 FL (ref 80–100)
MONOCYTES # BLD AUTO: 362 CELLS/UL (ref 200–950)
MONOCYTES NFR BLD AUTO: 5.1 %
NEUTROPHILS # BLD AUTO: 4004 CELLS/UL (ref 1500–7800)
NEUTROPHILS NFR BLD AUTO: 56.4 %
NONHDLC SERPL-MCNC: 129 MG/DL (CALC)
PLATELET # BLD AUTO: 341 THOUSAND/UL (ref 140–400)
PMV BLD REES-ECKER: 10.5 FL (ref 7.5–12.5)
POTASSIUM SERPL-SCNC: 3.9 MMOL/L (ref 3.5–5.3)
PROT SERPL-MCNC: 7 G/DL (ref 6.1–8.1)
RBC # BLD AUTO: 4.26 MILLION/UL (ref 3.8–5.1)
SODIUM SERPL-SCNC: 139 MMOL/L (ref 135–146)
TRIGL SERPL-MCNC: 54 MG/DL
TSH SERPL-ACNC: 3.26 MIU/L
WBC # BLD AUTO: 7.1 THOUSAND/UL (ref 3.8–10.8)

## 2023-05-24 ENCOUNTER — TELEPHONE (OUTPATIENT)
Dept: FAMILY MEDICINE CLINIC | Facility: CLINIC | Age: 38
End: 2023-05-24

## 2023-05-24 NOTE — TELEPHONE ENCOUNTER
As far as I am aware, patient does not have high blood pressure  This diagnosis is not listed in her medical history, current problem list, nor has bp been elevated at time of her visit here in office     Okay to take imitrex

## 2023-05-24 NOTE — TELEPHONE ENCOUNTER
Is she referring to the migraine headache? If so, recommend she take one of the imitrex tablets that I prescribed when she gets a headache  Should keep a diary of headache dates and bring along to her next visit  Of course if headache is not relieved with the imitrex or worsens in severity, should call here

## 2023-05-24 NOTE — TELEPHONE ENCOUNTER
----- Message from Doug Muhammad DO sent at 5/24/2023  8:31 AM EDT -----  Please call patient to let her know that I reviewed her labs  Everything looked great  Blood sugar was normal  Cholesterol was good  No anemia

## 2023-05-24 NOTE — TELEPHONE ENCOUNTER
Results -  Patient is aware of WNL lab results  Advice Only -  Patient stated that she still feels the same pain as at the last OV  Lat night the pain was very sharp  Please advise

## 2023-05-24 NOTE — TELEPHONE ENCOUNTER
I spoke with the patient  Patient confirmed that her migraine pain persists, but nausea and vomiting have resolved  Patient states that she has used the Imitrex, however, her pharmacist told her that since she has high BP, she is not to use that medication as it is contraindicated  Pt's next OV 6/14/2023  Please review and advise

## 2023-06-13 ENCOUNTER — TELEPHONE (OUTPATIENT)
Dept: FAMILY MEDICINE CLINIC | Facility: CLINIC | Age: 38
End: 2023-06-13

## 2023-06-13 NOTE — TELEPHONE ENCOUNTER
----- Message from Chelly Asher DO sent at 6/13/2023  3:10 PM EDT -----  Patient scheduled for PE tomorrow  Has not completed labs  Can you call to remind her to get labs done? Thanks

## (undated) DEVICE — INSUFFLATION NEEDLE TO ESTABLISH PNEUMOPERITONEUM.: Brand: INSUFFLATION NEEDLE

## (undated) DEVICE — ALLENTOWN LAP CHOLE APP PACK: Brand: CARDINAL HEALTH

## (undated) DEVICE — TELFA NON-ADHERENT ABSORBENT DRESSING: Brand: TELFA

## (undated) DEVICE — SCD SEQUENTIAL COMPRESSION COMFORT SLEEVE MEDIUM KNEE LENGTH: Brand: KENDALL SCD

## (undated) DEVICE — GLOVE SRG LF STRL BGL SKNSNS 8 PF

## (undated) DEVICE — CHLORAPREP HI-LITE 26ML ORANGE

## (undated) DEVICE — SKIN MARKER DUAL TIP WITH RULER CAP, FLEXIBLE RULER AND LABELS: Brand: DEVON

## (undated) DEVICE — TROCAR: Brand: KII FIOS FIRST ENTRY

## (undated) DEVICE — LIGAMAX 5 MM ENDOSCOPIC MULTIPLE CLIP APPLIER: Brand: LIGAMAX

## (undated) DEVICE — ENDOPATH 5MM CURVED SCISSORS WITH MONOPOLAR CAUTERY: Brand: ENDOPATH

## (undated) DEVICE — IRRIG ENDO FLO TUBING

## (undated) DEVICE — 3M™ STERI-STRIP™ REINFORCED ADHESIVE SKIN CLOSURES, R1547, 1/2 IN X 4 IN (12 MM X 100 MM), 6 STRIPS/ENVELOPE: Brand: 3M™ STERI-STRIP™

## (undated) DEVICE — SWABSTCK, BENZOIN TINCTURE, 1/PK, STRL: Brand: APLICARE

## (undated) DEVICE — STERILE POLYISOPRENE POWDER-FREE SURGICAL GLOVES: Brand: PROTEXIS

## (undated) DEVICE — ABDOMINAL PAD: Brand: DERMACEA

## (undated) DEVICE — SUT MONOCRYL 4-0 PS-2 27 IN Y426H

## (undated) DEVICE — SUT PLAIN 2-0 CTX 27 IN 872H

## (undated) DEVICE — NEEDLE BLUNT 18 G X 1 1/2IN

## (undated) DEVICE — TROCAR: Brand: KII® SLEEVE

## (undated) DEVICE — 3M™ STERI-STRIP™ COMPOUND BENZOIN TINCTURE 40 BAGS/CARTON 4 CARTONS/CASE C1544: Brand: 3M™ STERI-STRIP™

## (undated) DEVICE — SUT VICRYL 0 UR-6 27 IN J603H

## (undated) DEVICE — 4-PORT MANIFOLD: Brand: NEPTUNE 2

## (undated) DEVICE — INTENDED FOR TISSUE SEPARATION, AND OTHER PROCEDURES THAT REQUIRE A SHARP SURGICAL BLADE TO PUNCTURE OR CUT.: Brand: BARD-PARKER SAFETY BLADES SIZE 11, STERILE

## (undated) DEVICE — MEDI-VAC YANKAUER SUCTION HANDLE W/STRAIGHT TIP & CONTROL VENT: Brand: CARDINAL HEALTH

## (undated) DEVICE — PACK C-SECTION PBDS

## (undated) DEVICE — BAG DECANTER

## (undated) DEVICE — SYRINGE 30ML LL

## (undated) DEVICE — STERILE POLYISOPRENE POWDER-FREE SURGICAL GLOVES WITH EMOLLIENT COATING: Brand: PROTEXIS

## (undated) DEVICE — SUTURE VICRYL 0 27IN CTX

## (undated) DEVICE — Device

## (undated) DEVICE — STAPLER INSORB SUBCUTICULAR 30 SINGLE USE

## (undated) DEVICE — SUT CHROMIC 0 CT-1 27 IN 812H